# Patient Record
Sex: MALE | Race: WHITE | Employment: FULL TIME | URBAN - METROPOLITAN AREA
[De-identification: names, ages, dates, MRNs, and addresses within clinical notes are randomized per-mention and may not be internally consistent; named-entity substitution may affect disease eponyms.]

---

## 2018-08-06 ENCOUNTER — HOSPITAL ENCOUNTER (INPATIENT)
Age: 23
LOS: 4 days | Discharge: HOME HEALTH | DRG: 561 | End: 2018-08-10
Attending: PHYSICAL MEDICINE & REHABILITATION | Admitting: PHYSICAL MEDICINE & REHABILITATION
Payer: COMMERCIAL
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
## 2018-08-06 PROCEDURE — 1280000000 HC REHAB R&B

## 2018-08-06 PROCEDURE — 6370000000 HC RX 637 (ALT 250 FOR IP): Performed by: PHYSICAL MEDICINE & REHABILITATION

## 2018-08-06 PROCEDURE — 6360000002 HC RX W HCPCS: Performed by: PHYSICAL MEDICINE & REHABILITATION

## 2018-08-06 RX ORDER — OXYCODONE HYDROCHLORIDE 5 MG/1
10 TABLET ORAL EVERY 4 HOURS PRN
Status: DISCONTINUED | OUTPATIENT
Start: 2018-08-06 | End: 2018-08-07

## 2018-08-06 RX ORDER — OXYCODONE HYDROCHLORIDE 5 MG/1
5 TABLET ORAL EVERY 4 HOURS PRN
Status: DISCONTINUED | OUTPATIENT
Start: 2018-08-06 | End: 2018-08-07

## 2018-08-06 RX ORDER — DOCUSATE SODIUM 100 MG/1
100 CAPSULE, LIQUID FILLED ORAL 2 TIMES DAILY
Status: DISCONTINUED | OUTPATIENT
Start: 2018-08-06 | End: 2018-08-10 | Stop reason: HOSPADM

## 2018-08-06 RX ORDER — ACETAMINOPHEN 325 MG/1
650 TABLET ORAL EVERY 4 HOURS PRN
Status: DISCONTINUED | OUTPATIENT
Start: 2018-08-06 | End: 2018-08-07

## 2018-08-06 RX ORDER — LIDOCAINE 50 MG/G
2 PATCH TOPICAL DAILY
Status: DISCONTINUED | OUTPATIENT
Start: 2018-08-06 | End: 2018-08-10 | Stop reason: HOSPADM

## 2018-08-06 RX ORDER — TRAZODONE HYDROCHLORIDE 50 MG/1
50 TABLET ORAL NIGHTLY PRN
Status: DISCONTINUED | OUTPATIENT
Start: 2018-08-06 | End: 2018-08-10 | Stop reason: HOSPADM

## 2018-08-06 RX ORDER — ONDANSETRON 4 MG/1
8 TABLET, ORALLY DISINTEGRATING ORAL EVERY 8 HOURS PRN
Status: DISCONTINUED | OUTPATIENT
Start: 2018-08-06 | End: 2018-08-10 | Stop reason: HOSPADM

## 2018-08-06 RX ORDER — GABAPENTIN 300 MG/1
300 CAPSULE ORAL 3 TIMES DAILY
Status: DISCONTINUED | OUTPATIENT
Start: 2018-08-06 | End: 2018-08-10 | Stop reason: HOSPADM

## 2018-08-06 RX ADMIN — ENOXAPARIN SODIUM 50 MG: 60 INJECTION SUBCUTANEOUS at 23:39

## 2018-08-06 RX ADMIN — GABAPENTIN 300 MG: 300 CAPSULE ORAL at 20:41

## 2018-08-06 RX ADMIN — DOCUSATE SODIUM 100 MG: 100 CAPSULE, LIQUID FILLED ORAL at 20:41

## 2018-08-06 RX ADMIN — OXYCODONE HYDROCHLORIDE 10 MG: 5 TABLET ORAL at 20:41

## 2018-08-06 RX ADMIN — TRAZODONE HYDROCHLORIDE 50 MG: 50 TABLET ORAL at 22:34

## 2018-08-06 ASSESSMENT — PAIN DESCRIPTION - LOCATION: LOCATION: LEG;ARM;HIP

## 2018-08-06 ASSESSMENT — PAIN DESCRIPTION - ORIENTATION: ORIENTATION: RIGHT

## 2018-08-06 ASSESSMENT — PAIN SCALES - GENERAL
PAINLEVEL_OUTOF10: 3
PAINLEVEL_OUTOF10: 7
PAINLEVEL_OUTOF10: 5
PAINLEVEL_OUTOF10: 5

## 2018-08-06 ASSESSMENT — PAIN DESCRIPTION - DESCRIPTORS: DESCRIPTORS: CONSTANT;DISCOMFORT;SHARP

## 2018-08-06 ASSESSMENT — PAIN DESCRIPTION - ONSET: ONSET: ON-GOING

## 2018-08-06 ASSESSMENT — PAIN DESCRIPTION - FREQUENCY: FREQUENCY: CONTINUOUS
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
## 2018-08-06 NOTE — PROGRESS NOTES
4 Eyes Skin Assessment     The patient is being assess for   Admission    I agree that 2 RN's have performed a thorough Head to Toe Skin Assessment on the patient. ALL assessment sites listed below have been assessed. Areas assessed by both nurses:   [x]   Head, Face, and Ears   [x]   Shoulders, Back, and Chest, Abdomen  [x]   Arms, Elbows, and Hands   [x]   Coccyx, Sacrum, and Ischium  [x]   Legs, Feet, and Heels            **SHARE this note so that the co-signing nurse is able to place an eSignature**    Co-signer eSignature [employer]    Does the Patient have Skin Breakdown?   No          Thien Prevention initiated:  Yes   Wound Care Orders initiated:  Yes      [address]  nurse consulted for Pressure Injury (Stage 3,4, Unstageable, DTI, NWPT, Complex wounds)and New or Established Ostomies:  No      Primary Nurse eSignature: Electronically signed by Shruthi Parra RN on 8/6/18 at 4:38 PM

## 2018-08-07 LAB
A/G RATIO: 0.9 (ref 1.1–2.2)
ALBUMIN SERPL-MCNC: 3.5 G/DL (ref 3.4–5)
ALP BLD-CCNC: 142 U/L (ref 40–129)
ALT SERPL-CCNC: 34 U/L (ref 10–40)
ANION GAP SERPL CALCULATED.3IONS-SCNC: 13 MMOL/L (ref 3–16)
AST SERPL-CCNC: 22 U/L (ref 15–37)
BILIRUB SERPL-MCNC: 0.5 MG/DL (ref 0–1)
BUN BLDV-MCNC: 20 MG/DL (ref 7–20)
CALCIUM SERPL-MCNC: 9.6 MG/DL (ref 8.3–10.6)
CHLORIDE BLD-SCNC: 99 MMOL/L (ref 99–110)
CO2: 24 MMOL/L (ref 21–32)
CREAT SERPL-MCNC: <0.5 MG/DL (ref 0.9–1.3)
GFR AFRICAN AMERICAN: >60
GFR NON-AFRICAN AMERICAN: >60
GLOBULIN: 3.9 G/DL
GLUCOSE BLD-MCNC: 114 MG/DL (ref 70–99)
HCT VFR BLD CALC: 41.1 % (ref 40.5–52.5)
HEMOGLOBIN: 13.8 G/DL (ref 13.5–17.5)
MCH RBC QN AUTO: 30.6 PG (ref 26–34)
MCHC RBC AUTO-ENTMCNC: 33.5 G/DL (ref 31–36)
MCV RBC AUTO: 91.3 FL (ref 80–100)
PDW BLD-RTO: 12.8 % (ref 12.4–15.4)
PLATELET # BLD: 353 K/UL (ref 135–450)
PMV BLD AUTO: 7.3 FL (ref 5–10.5)
POTASSIUM REFLEX MAGNESIUM: 4.2 MMOL/L (ref 3.5–5.1)
RBC # BLD: 4.5 M/UL (ref 4.2–5.9)
SODIUM BLD-SCNC: 136 MMOL/L (ref 136–145)
TOTAL PROTEIN: 7.4 G/DL (ref 6.4–8.2)
WBC # BLD: 8 K/UL (ref 4–11)

## 2018-08-07 PROCEDURE — 97110 THERAPEUTIC EXERCISES: CPT

## 2018-08-07 PROCEDURE — 97535 SELF CARE MNGMENT TRAINING: CPT

## 2018-08-07 PROCEDURE — 97116 GAIT TRAINING THERAPY: CPT

## 2018-08-07 PROCEDURE — 97530 THERAPEUTIC ACTIVITIES: CPT

## 2018-08-07 PROCEDURE — 97167 OT EVAL HIGH COMPLEX 60 MIN: CPT

## 2018-08-07 PROCEDURE — 36415 COLL VENOUS BLD VENIPUNCTURE: CPT

## 2018-08-07 PROCEDURE — 6370000000 HC RX 637 (ALT 250 FOR IP): Performed by: PHYSICAL MEDICINE & REHABILITATION

## 2018-08-07 PROCEDURE — 6360000002 HC RX W HCPCS: Performed by: PHYSICAL MEDICINE & REHABILITATION

## 2018-08-07 PROCEDURE — 80053 COMPREHEN METABOLIC PANEL: CPT

## 2018-08-07 PROCEDURE — 97163 PT EVAL HIGH COMPLEX 45 MIN: CPT

## 2018-08-07 PROCEDURE — 1280000000 HC REHAB R&B

## 2018-08-07 PROCEDURE — 85027 COMPLETE CBC AUTOMATED: CPT

## 2018-08-07 RX ORDER — OXYCODONE HYDROCHLORIDE 15 MG/1
15 TABLET ORAL EVERY 4 HOURS PRN
Status: DISCONTINUED | OUTPATIENT
Start: 2018-08-07 | End: 2018-08-10 | Stop reason: HOSPADM

## 2018-08-07 RX ORDER — OXYCODONE HYDROCHLORIDE 5 MG/1
10 TABLET ORAL EVERY 4 HOURS PRN
Status: DISCONTINUED | OUTPATIENT
Start: 2018-08-07 | End: 2018-08-10 | Stop reason: HOSPADM

## 2018-08-07 RX ORDER — ACETAMINOPHEN 500 MG
1000 TABLET ORAL EVERY 8 HOURS PRN
Status: DISCONTINUED | OUTPATIENT
Start: 2018-08-07 | End: 2018-08-10 | Stop reason: HOSPADM

## 2018-08-07 RX ADMIN — OXYCODONE HYDROCHLORIDE 15 MG: 15 TABLET ORAL at 17:37

## 2018-08-07 RX ADMIN — ENOXAPARIN SODIUM 50 MG: 60 INJECTION SUBCUTANEOUS at 07:59

## 2018-08-07 RX ADMIN — OXYCODONE HYDROCHLORIDE 10 MG: 5 TABLET ORAL at 08:00

## 2018-08-07 RX ADMIN — OXYCODONE HYDROCHLORIDE 15 MG: 15 TABLET ORAL at 23:03

## 2018-08-07 RX ADMIN — DOCUSATE SODIUM 100 MG: 100 CAPSULE, LIQUID FILLED ORAL at 07:59

## 2018-08-07 RX ADMIN — OXYCODONE HYDROCHLORIDE 10 MG: 5 TABLET ORAL at 02:01

## 2018-08-07 RX ADMIN — GABAPENTIN 300 MG: 300 CAPSULE ORAL at 21:55

## 2018-08-07 RX ADMIN — ENOXAPARIN SODIUM 50 MG: 60 INJECTION SUBCUTANEOUS at 21:55

## 2018-08-07 RX ADMIN — OXYCODONE HYDROCHLORIDE 10 MG: 5 TABLET ORAL at 12:35

## 2018-08-07 RX ADMIN — GABAPENTIN 300 MG: 300 CAPSULE ORAL at 14:29

## 2018-08-07 RX ADMIN — DOCUSATE SODIUM 100 MG: 100 CAPSULE, LIQUID FILLED ORAL at 21:55

## 2018-08-07 RX ADMIN — GABAPENTIN 300 MG: 300 CAPSULE ORAL at 07:59

## 2018-08-07 ASSESSMENT — PAIN DESCRIPTION - ORIENTATION
ORIENTATION: LEFT;RIGHT
ORIENTATION: RIGHT;LEFT

## 2018-08-07 ASSESSMENT — PAIN SCALES - GENERAL
PAINLEVEL_OUTOF10: 0
PAINLEVEL_OUTOF10: 8
PAINLEVEL_OUTOF10: 8
PAINLEVEL_OUTOF10: 2
PAINLEVEL_OUTOF10: 3
PAINLEVEL_OUTOF10: 7
PAINLEVEL_OUTOF10: 5
PAINLEVEL_OUTOF10: 6
PAINLEVEL_OUTOF10: 7
PAINLEVEL_OUTOF10: 7
PAINLEVEL_OUTOF10: 8

## 2018-08-07 ASSESSMENT — PAIN DESCRIPTION - PAIN TYPE: TYPE: ACUTE PAIN

## 2018-08-07 ASSESSMENT — PAIN DESCRIPTION - LOCATION: LOCATION: LEG;ARM;HIP

## 2018-08-07 ASSESSMENT — PAIN DESCRIPTION - DESCRIPTORS: DESCRIPTORS: CONSTANT;DISCOMFORT;SHARP

## 2018-08-07 ASSESSMENT — PAIN DESCRIPTION - ONSET: ONSET: ON-GOING

## 2018-08-07 ASSESSMENT — PAIN DESCRIPTION - FREQUENCY: FREQUENCY: CONTINUOUS
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
## 2018-08-07 NOTE — PLAN OF CARE
Problem: Pain:  Goal: Control of acute pain  Control of acute pain   Outcome: Ongoing  Patient able to use pain rating scale 0/10 adequately without problems. Pain medications explained along with frequency and when to notify the nurse with  possible side effects. Verbalizes understanding. Call light within reach. Resting quietly in bed. ASk for appropriatley and delivered as needed.

## 2018-08-07 NOTE — PROGRESS NOTES
Walker  Shower - Transfer Type: To and From  Shower - Transfer To: Transfer tub bench  Shower - Technique: Ambulating  Shower Transfers: Minimal assistance  Shower Transfers Comments: with platform walker  ADL  Feeding: Modified independent  (use of LUE and modified technique)  Grooming: Modified independent  (seated in w/c at sink for brushing teeth, washing hand, and washing faces, and combing hair)  UE Bathing: Supervision (to bag RUE)  LE Bathing: Minimal assistance (to bathe L foot)  UE Dressing: Setup  LE Dressing: Maximum assistance (assist to pull underwear/pants over R hip, don L shoe/sock, and thread RLE into pants)  Toileting: Contact guard assistance  Tone RUE  RUE Tone: Normotonic  Tone LUE  LUE Tone: Normotonic  Coordination  Movements Are Fluid And Coordinated: Yes     Bed mobility  Sit to Supine: Stand by assistance  Transfers  Stand Step Transfers: Minimal assistance (with platform walker)  Stand Pivot Transfers: Minimal assistance (with platform walker)  Sit Pivot Transfers: Contact guard assistance  Sit to stand: Minimal assistance  Stand to sit: Minimal assistance  Vision - Basic Assessment  Prior Vision: No visual deficits  Visual History: No significant visual history  Patient Visual Report: No visual complaint reported.   Cognition  Overall Cognitive Status: WFL  Perception  Overall Perceptual Status: WFL     Sensation  Overall Sensation Status: WFL  Type of ROM/Therapeutic Exercise  Type of ROM/Therapeutic Exercise: Free weights;AROM  Comment: 3#, 6#  Exercises  Shoulder Flexion: LUE 6#, RUE AROM x20  Shoulder Extension: LUE 6#, RUE AROM x20  Horizontal ABduction: LUE 3#, RUE AROM x20  Horizontal ADduction: LUE 3#, RUE AROM x20  Elbow Flexion: LUE 6#, RUE AROM x20  Elbow Extension: LUE 6#, RUE AROM x20  Wrist Flexion: LUE 6#, x20  Wrist Extension: LUE 6#, x20  Finger Flexion: AROM RUE x20  Finger Extension: AROM RUE x20     LUE AROM (degrees)  LUE AROM : WFL  Left Hand AROM (degrees)  Left Hand AROM: WFL  RUE AROM (degrees)  RUE AROM : Exceptions  RUE General AROM: shoulder and elbow WFL, wrist immobilized in splint  Right Hand AROM (degrees)  Right Hand AROM: Exceptions  Right Hand General AROM: IPs WFL, MCPs immobilized in splint  LUE Strength  Gross LUE Strength: Exceptions to [facility]  LUE Strength Comment: grossly 4+/5  RUE Strength  Gross RUE Strength: Exceptions to [facility]  R Shoulder Flex: 4/5  R Shoulder Ext: 4/5  R Elbow Flex: 4/5  R Elbow Ext: 4/5  R Hand Grasp: NT  R Hand Release: NT     Hand Dominance  Hand Dominance: Right            Assessment   Performance deficits / Impairments: Decreased functional mobility ; Decreased safe awareness;Decreased balance;Decreased ADL status; Decreased endurance;Decreased strength;Decreased high-level IADLs  Assessment: Pt presents with the above deficits requiring skilled OT services to return to OF. Pt required min VCs for technique with platform walker and w/c. Pt with pain limiting ability to perform LB ADLs and required max A for LB dressing. Pt performed w/c mobility with good safety and transfers with min A/CGA. Prognosis: Good  Decision Making: High Complexity  Patient Education: role of OT, transfers training, ADL retraining, ther ex, safety  REQUIRES OT FOLLOW UP: Yes  Activity Tolerance  Activity Tolerance: Patient Tolerated treatment well;Patient limited by pain  Safety Devices  Safety Devices in place: Yes  Type of devices: Nurse notified;Gait belt;Call light within reach; Left in bed;Bed alarm in place         Plan   Plan  Times per week: 5/7 days  Plan weeks: 7 days  Current Treatment Recommendations: Strengthening, Endurance Training, Neuromuscular Re-education, Patient/Caregiver Education & Training, Self-Care / ADL, ROM, Balance Training, Functional Mobility Training, Safety Education & Training, Wheelchair Mobility Training, Equipment Evaluation, Education, & procurement    Goals  Short term goals  Time Frame for Short term goals: 3

## 2018-08-08 PROCEDURE — 6360000002 HC RX W HCPCS: Performed by: PHYSICAL MEDICINE & REHABILITATION

## 2018-08-08 PROCEDURE — 97110 THERAPEUTIC EXERCISES: CPT

## 2018-08-08 PROCEDURE — 6370000000 HC RX 637 (ALT 250 FOR IP): Performed by: PHYSICAL MEDICINE & REHABILITATION

## 2018-08-08 PROCEDURE — 97535 SELF CARE MNGMENT TRAINING: CPT

## 2018-08-08 PROCEDURE — 97530 THERAPEUTIC ACTIVITIES: CPT

## 2018-08-08 PROCEDURE — 97116 GAIT TRAINING THERAPY: CPT

## 2018-08-08 PROCEDURE — 1280000000 HC REHAB R&B

## 2018-08-08 RX ADMIN — ENOXAPARIN SODIUM 50 MG: 60 INJECTION SUBCUTANEOUS at 20:45

## 2018-08-08 RX ADMIN — DOCUSATE SODIUM 100 MG: 100 CAPSULE, LIQUID FILLED ORAL at 08:11

## 2018-08-08 RX ADMIN — ENOXAPARIN SODIUM 50 MG: 60 INJECTION SUBCUTANEOUS at 08:12

## 2018-08-08 RX ADMIN — GABAPENTIN 300 MG: 300 CAPSULE ORAL at 15:03

## 2018-08-08 RX ADMIN — DOCUSATE SODIUM 100 MG: 100 CAPSULE, LIQUID FILLED ORAL at 20:44

## 2018-08-08 RX ADMIN — OXYCODONE HYDROCHLORIDE 15 MG: 15 TABLET ORAL at 06:19

## 2018-08-08 RX ADMIN — ACETAMINOPHEN 1000 MG: 500 TABLET ORAL at 09:16

## 2018-08-08 RX ADMIN — OXYCODONE HYDROCHLORIDE 15 MG: 15 TABLET ORAL at 10:23

## 2018-08-08 RX ADMIN — OXYCODONE HYDROCHLORIDE 15 MG: 15 TABLET ORAL at 15:03

## 2018-08-08 RX ADMIN — GABAPENTIN 300 MG: 300 CAPSULE ORAL at 20:44

## 2018-08-08 RX ADMIN — GABAPENTIN 300 MG: 300 CAPSULE ORAL at 08:11

## 2018-08-08 RX ADMIN — OXYCODONE HYDROCHLORIDE 15 MG: 15 TABLET ORAL at 19:57

## 2018-08-08 ASSESSMENT — PAIN SCALES - GENERAL
PAINLEVEL_OUTOF10: 7
PAINLEVEL_OUTOF10: 8
PAINLEVEL_OUTOF10: 8
PAINLEVEL_OUTOF10: 0
PAINLEVEL_OUTOF10: 8
PAINLEVEL_OUTOF10: 8
PAINLEVEL_OUTOF10: 9
PAINLEVEL_OUTOF10: 7
PAINLEVEL_OUTOF10: 0

## 2018-08-08 ASSESSMENT — PAIN DESCRIPTION - DESCRIPTORS
DESCRIPTORS: ACHING;CONSTANT;DISCOMFORT;SHARP
DESCRIPTORS: SHOOTING;BURNING
DESCRIPTORS: ACHING;CONSTANT;DISCOMFORT;SHARP
DESCRIPTORS: ACHING
DESCRIPTORS: ACHING;CONSTANT;SHARP
DESCRIPTORS: ACHING;CONSTANT;DISCOMFORT;SHARP

## 2018-08-08 ASSESSMENT — PAIN DESCRIPTION - ONSET
ONSET: ON-GOING

## 2018-08-08 ASSESSMENT — PAIN DESCRIPTION - LOCATION
LOCATION: RIB CAGE;PELVIS
LOCATION: PELVIS;RIB CAGE
LOCATION: RIB CAGE;PELVIS
LOCATION: PELVIS;RIB CAGE

## 2018-08-08 ASSESSMENT — PAIN DESCRIPTION - PAIN TYPE
TYPE: ACUTE PAIN

## 2018-08-08 ASSESSMENT — PAIN DESCRIPTION - ORIENTATION
ORIENTATION: RIGHT;LEFT
ORIENTATION: LEFT;RIGHT
ORIENTATION: RIGHT;LEFT

## 2018-08-08 ASSESSMENT — PAIN DESCRIPTION - FREQUENCY
FREQUENCY: CONTINUOUS

## 2018-08-08 NOTE — PROGRESS NOTES
Physical Therapy  Facility/Department: [facility]  Daily Treatment Note  NAME: Meka Oliveira  : 1995  MRN: [id number]    Date of Service: 2018    Discharge Recommendations:  Patient would benefit from continued therapy after discharge, Home with assist PRN   PT Equipment Recommendations  Equipment Needed: Yes  Walker: Platform Right  Wheelchair: Standard  Other: recommend temporary ramp or family able to assist pt in /out  of home via [facility]    Patient Diagnosis(es): There were no encounter diagnoses. has a past medical history of Anxiety; Depression; Headache(784.0); and Substance abuse.   has a past surgical history that includes fracture surgery. Restrictions  Restrictions/Precautions  Restrictions/Precautions: Weight Bearing, General Precautions, Fall Risk  Required Braces or Orthoses?: No  Lower Extremity Weight Bearing Restrictions  Right Lower Extremity Weight Bearing: Non Weight Bearing  Upper Extremity Weight Bearing Restrictions  Right Upper Extremity Weight Bearing: Platform  Other:  elbow weight bearing to right upper limb; Position Activity Restriction  Other position/activity restrictions: Non weight bearing to right leg; elbow weight bearing to right upper limb; Notify physician for pulse less than 50 or greater than 120, respiratory rate less than 12 or greater than 25, systolic BP less than 90 or greater than 493, diastolic BP less than 50 or greater than 100. Subjective   General  Family / Caregiver Present: No  Subjective  Subjective: Pt rates pain pelvis as 8/10 and notes pelvic pain as well as rib pain with mobility.    Pain Screening  Patient Currently in Pain: Yes  Pain Assessment  Pain Assessment: 0-10  Pain Level: 8  Pain Location: Pelvis;Rib cage  Pain Orientation: Right;Left  Pain Intervention(s): Medication (see eMar);Repositioned  Vital Signs  Patient Currently in Pain: Yes       Orientation  Orientation  Overall Orientation Status: Within Normal Limits  Objective   Bed x15 BLE   Heelslides: heel/slides/SKTC in range that doesn't increase pain x10 BLE   Gluteal Sets: x15 BLE   Hip Abduction: Hip IR/ER in supine in ROM without  increased pain, hip abduction supine in ROM without increased pain. 15 reps BLE   Knee Short Arc Quad: x30 BLE   Ankle Pumps: x30 RLE  Comments: Pt educated in above exercises,  UE/LE  WB precautions, use of abdominals for pelvic stability with LE movement in bed with pt demo indep in hep  with pt given written hep. Pt seen for split session:  Reviewed hep for LE, given written hep with pt demo indep in written hep of supine LE therex. transfer Bed>WC sit pivot transfer CGA with indep  set up   WC propulsion 100 feet x3 indep   Sit<>stand  with platform walker and cues for technique 4x from WC<>chair min asssit with facilitated anterior weight shift but no lifting . Gait with platform walker: cues to lean forward, bend left hip and knee for counter balance and not to hold Left foot out in front when walking but behind with knee bend NWB RLE and WB on right elbow only . CGA 10 feet x4   Exercises: Pt on return demo indep with written hep. Quad Sets: x15 BLE   Heelslides: heel/slides/SKTC in range that doesn't increase pain x10 BLE   Gluteal Sets: x15 BLE   Hip Abduction: Hip IR/ER in supine in ROM without  increased pain, hip abduction supine in ROM without increased pain. 15 reps BLE   Knee Short Arc Quad: x30 BLE   Ankle Pumps: x30 RLE                 Assessment   Assessment: Pt seen for split session today with education in WB precautions, LE therex hep. gait train and transfer train with platform walker and bed mobility train with abdominal bracing and use of pillows between LE. Pt progressed to indep with bed mobility and CGA to mod assist with transfers as well as 10 feet gait with  platform walker. Pelvic pain with mobility limits indep with mobility at present. PT requesting his family come in for training this week prior to DC .

## 2018-08-08 NOTE — PLAN OF CARE
Problem: Pain:  Goal: Control of acute pain  Control of acute pain   Outcome: Ongoing  Pt has been having constant acute pain in chest/rib cage and pelvis. Pt receives oxycodone 15mg every 4 hours for pain rated between 4-10. Pt also received Tylenol 650mg this morning between doses of the oxycodone. Pt has ordered 2 lidocaine patches that he prefers on the right side of his rib cage. Educated on nonpharm remedies to decrease pain, such as heat packs. Will continue to monitor pt pain level.
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
## 2018-08-09 LAB
ANION GAP SERPL CALCULATED.3IONS-SCNC: 17 MMOL/L (ref 3–16)
BASOPHILS ABSOLUTE: 0.1 K/UL (ref 0–0.2)
BASOPHILS RELATIVE PERCENT: 1.1 %
BUN BLDV-MCNC: 20 MG/DL (ref 7–20)
CALCIUM SERPL-MCNC: 9.7 MG/DL (ref 8.3–10.6)
CHLORIDE BLD-SCNC: 100 MMOL/L (ref 99–110)
CO2: 21 MMOL/L (ref 21–32)
CREAT SERPL-MCNC: 0.7 MG/DL (ref 0.9–1.3)
EOSINOPHILS ABSOLUTE: 0.2 K/UL (ref 0–0.6)
EOSINOPHILS RELATIVE PERCENT: 1.9 %
GFR AFRICAN AMERICAN: >60
GFR NON-AFRICAN AMERICAN: >60
GLUCOSE BLD-MCNC: 106 MG/DL (ref 70–99)
HCT VFR BLD CALC: 42 % (ref 40.5–52.5)
HEMOGLOBIN: 14.4 G/DL (ref 13.5–17.5)
LYMPHOCYTES ABSOLUTE: 2.4 K/UL (ref 1–5.1)
LYMPHOCYTES RELATIVE PERCENT: 28.7 %
MCH RBC QN AUTO: 30.6 PG (ref 26–34)
MCHC RBC AUTO-ENTMCNC: 34.2 G/DL (ref 31–36)
MCV RBC AUTO: 89.5 FL (ref 80–100)
MONOCYTES ABSOLUTE: 0.9 K/UL (ref 0–1.3)
MONOCYTES RELATIVE PERCENT: 10.3 %
NEUTROPHILS ABSOLUTE: 4.9 K/UL (ref 1.7–7.7)
NEUTROPHILS RELATIVE PERCENT: 58 %
PDW BLD-RTO: 12.8 % (ref 12.4–15.4)
PLATELET # BLD: 459 K/UL (ref 135–450)
PMV BLD AUTO: 7.4 FL (ref 5–10.5)
POTASSIUM REFLEX MAGNESIUM: 4.5 MMOL/L (ref 3.5–5.1)
RBC # BLD: 4.69 M/UL (ref 4.2–5.9)
SODIUM BLD-SCNC: 138 MMOL/L (ref 136–145)
WBC # BLD: 8.5 K/UL (ref 4–11)

## 2018-08-09 PROCEDURE — 97116 GAIT TRAINING THERAPY: CPT

## 2018-08-09 PROCEDURE — 97110 THERAPEUTIC EXERCISES: CPT

## 2018-08-09 PROCEDURE — 80048 BASIC METABOLIC PNL TOTAL CA: CPT

## 2018-08-09 PROCEDURE — 36415 COLL VENOUS BLD VENIPUNCTURE: CPT

## 2018-08-09 PROCEDURE — 97530 THERAPEUTIC ACTIVITIES: CPT

## 2018-08-09 PROCEDURE — 97535 SELF CARE MNGMENT TRAINING: CPT

## 2018-08-09 PROCEDURE — 1280000000 HC REHAB R&B

## 2018-08-09 PROCEDURE — 6370000000 HC RX 637 (ALT 250 FOR IP): Performed by: PHYSICAL MEDICINE & REHABILITATION

## 2018-08-09 PROCEDURE — 85025 COMPLETE CBC W/AUTO DIFF WBC: CPT

## 2018-08-09 RX ORDER — OXYCODONE HYDROCHLORIDE 5 MG/1
10-15 TABLET ORAL EVERY 4 HOURS PRN
Qty: 50 TABLET | Refills: 0 | Status: SHIPPED | OUTPATIENT
Start: 2018-08-09 | End: 2018-08-16

## 2018-08-09 RX ORDER — ACETAMINOPHEN 500 MG
1000 TABLET ORAL EVERY 8 HOURS PRN
Qty: 120 TABLET | Refills: 0 | Status: SHIPPED | OUTPATIENT
Start: 2018-08-09 | End: 2021-08-20

## 2018-08-09 RX ORDER — PSEUDOEPHEDRINE HCL 30 MG
100 TABLET ORAL 2 TIMES DAILY PRN
Qty: 60 CAPSULE | Refills: 0 | COMMUNITY
Start: 2018-08-09 | End: 2021-08-20

## 2018-08-09 RX ORDER — GABAPENTIN 300 MG/1
300 CAPSULE ORAL 3 TIMES DAILY
Qty: 90 CAPSULE | Refills: 0 | Status: ON HOLD | OUTPATIENT
Start: 2018-08-09 | End: 2021-08-22 | Stop reason: HOSPADM

## 2018-08-09 RX ADMIN — DOCUSATE SODIUM 100 MG: 100 CAPSULE, LIQUID FILLED ORAL at 09:14

## 2018-08-09 RX ADMIN — OXYCODONE HYDROCHLORIDE 15 MG: 15 TABLET ORAL at 22:46

## 2018-08-09 RX ADMIN — GABAPENTIN 300 MG: 300 CAPSULE ORAL at 21:17

## 2018-08-09 RX ADMIN — OXYCODONE HYDROCHLORIDE 10 MG: 5 TABLET ORAL at 10:34

## 2018-08-09 RX ADMIN — OXYCODONE HYDROCHLORIDE 15 MG: 15 TABLET ORAL at 06:10

## 2018-08-09 RX ADMIN — OXYCODONE HYDROCHLORIDE 15 MG: 15 TABLET ORAL at 14:37

## 2018-08-09 RX ADMIN — GABAPENTIN 300 MG: 300 CAPSULE ORAL at 09:14

## 2018-08-09 RX ADMIN — DOCUSATE SODIUM 100 MG: 100 CAPSULE, LIQUID FILLED ORAL at 21:17

## 2018-08-09 RX ADMIN — GABAPENTIN 300 MG: 300 CAPSULE ORAL at 13:40

## 2018-08-09 RX ADMIN — OXYCODONE HYDROCHLORIDE 15 MG: 15 TABLET ORAL at 18:39

## 2018-08-09 ASSESSMENT — PAIN SCALES - GENERAL
PAINLEVEL_OUTOF10: 8
PAINLEVEL_OUTOF10: 8
PAINLEVEL_OUTOF10: 0
PAINLEVEL_OUTOF10: 8
PAINLEVEL_OUTOF10: 6
PAINLEVEL_OUTOF10: 0
PAINLEVEL_OUTOF10: 7
PAINLEVEL_OUTOF10: 0
PAINLEVEL_OUTOF10: 0
PAINLEVEL_OUTOF10: 9
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
## 2018-08-09 NOTE — DISCHARGE SUMMARY
ADL  Feeding: Independent  Grooming: Modified independent  (brushing teeth at sink)  UE Bathing: Independent  LE Bathing: Modified independent  (while seated)  UE Dressing: Setup  LE Dressing: Minimal assistance (for L sock and shoe)  Toileting: Contact guard assistance (for standing balance during clothing management)    Bed mobility  Rolling to Left: Independent  Rolling to Right: Independent  Supine to Sit: Independent  Sit to Supine: Independent  Scooting: Independent    Functional Transfers: Toilet Transfers  Toilet - Technique: Stand step  Equipment Used: Standard toilet  Toilet Transfer: Supervision    Tub Transfers  Tub - Transfer From: Wheelchair  Tub - Transfer Type: To and From  Tub - Transfer To: Transfer tub bench  Tub - Technique: Sit pivot  Tub Transfers: Supervision    Shower Transfers  Shower - Transfer From: Mammie Deems - Transfer Type: To and From  Shower - Transfer To:  Transfer tub bench  Shower - Technique: Ambulating  Shower Transfers: Contact Guard  Shower Transfers Comments: with platform walker, pt able to perform with supervision for sit pivot if needed           Functional Mobility  Functional - Mobility Device: Platform walker  Activity: To/from bathroom  Assist Level: Stand by assistance                           Perception  Overall Perceptual Status: WFL         UE Function:  Hand Dominance  Hand Dominance: Right             Admitting OT FIM scores  Eatin - Feeds self with adaptive equipment/dentures and/or feeds self with modified diet and/or performs own tube feeding  Groomin - Independent with all tasks using assistive device  Bathin - Able to bathe all 10 areas with device  Dressing-Upper: 5 - Requires setup/supervision/cues and/or requires assist with presthesis/brace only  Dressing-Lower: 2 - Requires assist with 4-5 parts of dressing  Toiletin - Requires steadying assistance only  Toilet Transfer: 4 - Requires steadying assistance only < 25% assist  Tub

## 2018-08-09 NOTE — PROGRESS NOTES
Delivered walker to [facility] hospital room. Arranged for wheelchair to be delivered to home. Patient will purchase their own TTB.   Thanks for the referral.  Wiliam Hernandez  8/9/2018

## 2018-08-09 NOTE — PROGRESS NOTES
goals met DC PT. Goals  Short term goals  Time Frame for Short term goals: 8/9/2018  Short term goal 1: Pt demo  indep in bed mobility sup<>sit, scooting. GOAL MET 8/8/2018 Pt demo  indep in bed mobility sup<>sit, scooting. Short term goal 2: Patient demo indep in LE supine and seated therex NWB RLE and no AROm right foot ankle. GOAL MET 8/8/2018 Patient given written hep with Patient demo indep in LE supine and seated therex NWB RLE and no AROm right foot ankle. Short term goal 3: Patient demo indep in [address] mobility carpet , level left/right turns and managing brakes and LE on/off foot pedals 150' feet (household distances) . GOAL MET 8/9/2018 Pt demo 300 feet indep WC propulsion with LUE/LLE (10 feet of carpet and 40 feet outdoors on concrete) carpet , level left/right turns and managing brakes and LE on/off foot pedals. Long term goals  Time Frame for Long term goals : 8/11/2018  Long term goal 1: Pt demo  modified indep in gait with platform walker NWB RLE and WB on elbow only RLE for distances WC to bathroom 10 feet or greater. GOAL MET 8/9/2018 Pt demo  modified indep in gait with platform walker NWB RLE and WB on elbow only RLE for distances WC to bathroom 10 feet  on level indoor/outdoor  and carpet. Long term goal 2: Pt demo  indep transfers bed<>WC and sit<>Stand with appropriate AD NWB RLE and WB on right elbow only. GOAL MET 8/9/2018 Patient demo  indep transfers bed<>WC sit pivot transfer with swing back arm rest  and sit<>Stand  with gait bed<>WC with right platform walker  AD NWB RLE and WB on right elbow only. Long term goal 3: Pt's father demo ability to indep assist pt up and down curb step with WC to enter and egress home. GOAL MET 8/9/2018 Pt's father demo ability to indep assist pt up and down curb step with WC to enter and egress home as well as demo ability to safely assist pt from [address] to car with use of platform walker. Patient Goals   Patient goals :  \"Be able to walk
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
## 2018-08-10 VITALS
WEIGHT: 301.37 LBS | BODY MASS INDEX: 40.82 KG/M2 | TEMPERATURE: 98 F | SYSTOLIC BLOOD PRESSURE: 144 MMHG | RESPIRATION RATE: 18 BRPM | HEART RATE: 86 BPM | DIASTOLIC BLOOD PRESSURE: 89 MMHG | OXYGEN SATURATION: 96 % | HEIGHT: 72 IN

## 2018-08-10 PROCEDURE — 6370000000 HC RX 637 (ALT 250 FOR IP): Performed by: PHYSICAL MEDICINE & REHABILITATION

## 2018-08-10 RX ADMIN — OXYCODONE HYDROCHLORIDE 15 MG: 15 TABLET ORAL at 07:47

## 2018-08-10 RX ADMIN — GABAPENTIN 300 MG: 300 CAPSULE ORAL at 07:48

## 2018-08-10 ASSESSMENT — PAIN DESCRIPTION - ORIENTATION: ORIENTATION: RIGHT

## 2018-08-10 ASSESSMENT — PAIN DESCRIPTION - LOCATION: LOCATION: LEG

## 2018-08-10 ASSESSMENT — PAIN SCALES - GENERAL
PAINLEVEL_OUTOF10: 8
PAINLEVEL_OUTOF10: 8

## 2018-08-10 NOTE — DISCHARGE SUMMARY
minutes with supervision for standing balance and good coordination and problem solving. Pt performed w/c mobility for collection of cones around kitchen area with pt opening cabinets and appliances. Pt with mod I for mobility and good safety awareness. Third Session: Pt completed supine<>sit with independence and sit pivot transfers bed<>w/c with supervision. Pt completed ther ex with good strength and moderate pain increase in R ribs. Pt performed all exercises with use of handout and no VCs.     Speech therapy:  Comprehension: 6 - Complex ideas 90% or device (hearing aid/glasses)  Expression: 6 - Device used to express complex ideas/needs  Social Interaction: 6 - Patient requires medication for mood and/or effect  Problem Solvin - Patient solves simple/routine tasks 25%-49%  Memory: 6 - Patient requires device to recall (e.g. memory book)      Significant Diagnostics:   Lab Results   Component Value Date    CREATININE 0.7 (L) 2018    BUN 20 2018     2018    K 4.5 2018     2018    CO2 21 2018       Lab Results   Component Value Date    WBC 8.5 2018    HGB 14.4 2018    HCT 42.0 2018    MCV 89.5 2018     (H) 2018       Disposition:  Outpatient Ortho procedure and then home with parents  Services: Home care RN (hold off of PT/OT until WB restrictions lifted)  DME: maual wheelchair, platform rolling walker, tub transfer bench    Discharge Condition: Stable    Follow-up:  See after visit summary from hospitalization    Discharge Medications:     Medication List      START taking these medications    acetaminophen 500 MG tablet  Commonly known as:  TYLENOL  Take 2 tablets by mouth every 8 hours as needed for Pain     docusate 100 MG Caps  Commonly known as:  COLACE, DULCOLAX  Take 100 mg by mouth 2 times daily as needed for Constipation     gabapentin 300 MG capsule  Commonly known as:  NEURONTIN  Take 1 capsule by mouth 3 times daily for 30 days. Jamel Davalos oxyCODONE 5 MG immediate release tablet  Commonly known as:  ROXICODONE  Take 2-3 tablets by mouth every 4 hours as needed for Pain for up to 7 days. .           Where to Get Your Medications      These medications were sent to [facility] [address] [facility] [address], [place] [address]    Phone:  [phone]   · acetaminophen 500 MG tablet  · gabapentin 300 MG capsule     You can get these medications from any pharmacy    Bring a paper prescription for each of these medications  · oxyCODONE 5 MG immediate release tablet  You don't need a prescription for these medications  · docusate 100 MG Caps         I spent over 35 minutes on this discharge encounter between counseling, coordination of care, and medication reconciliation.      Leon Lopez MD

## 2018-08-10 NOTE — CARE COORDINATION
ANA LUISA met with the patient. He stated to save money, his family is going to transport him to the surgery. ALBINO Landers stated the patient needs to be at the [facility] by 9:40am. Address: [address] [place] [address]. This was given to the patient. Addendum at 2:45pm: ANA LUISA was notified by Lucía Cortes that the patient needs a Tub Transfer Bench. Dunia Malave with [facility] aware.      Chloe Montgomery MSW, LSW
CASE MANAGEMENT DISCHARGE SUMMARY      Discharge to: Home with [facility] for nursing. Therapy to follow once Ortho says it's okay. New Durable Medical Equipment ordered/agency: Wheelchair and cushion and Right Platform Attachment Rolling walker through [employer]. Juancarlos Carrillo delivered the rolling walker to his room and the wheelchair will be delivered to his house. Patient is purchasing a Tub Transfer Bench on his own (see Alan Cartagena liaison's note on 8-9-18)    Transportation: Patient's dad is picking him up and taking him to surgery. Notified: Patient    Facility/Agency: [facility] faxed [facility] at [phone]. She confirmed it was received.    RN: Temple Gottron Helmer MSW, LSW
Received notification that patient will be having surgery Friday morning and will be discharged from our ARU prior to that. He is aware the DME will be delivered to his house from [place]. He may need transportation to surgery. Will follow up tomorrow.      Gisselle Garnett MSW, LSW
SW asked Dr. Hannah Benavides to order DME and document why the patient needs them. Rajinder Hoff with [facility] is aware patient will likely be ready for discharge this weekend per therapy and these items are needed by then.      Bel Julian MSW, LSW
community: He stated that his family, ina, parents and brother are a support. Discharge plan: He plans to return home with support from family    Summary: Patient is a 20year old male presenting to the ARU. He maintained appropriate eye contact and conversation throughout the initial assessment. He stated that his father should be the main contact. He was educated on team conferences and family training. He denied any questions or concerns. Electronic continuity of care form is on the chart. Case Management (CM) will continue to follow for recommendations from the treatment team. Please notify Case Management if needs or concerns arise.      Jackie Dos Santos MSW, LSW

## 2018-09-19 ENCOUNTER — TELEPHONE (OUTPATIENT)
Dept: INTERNAL MEDICINE CLINIC | Age: 23
End: 2018-09-19

## 2018-09-19 NOTE — TELEPHONE ENCOUNTER
Was given plan of care papers with note to call to try to set up office visit for face-to-face with patient to do paperwork. Dr. Letha Councilman also asked if ortho can sign. Mssg asked for pt to call back to set up appt or talk about options.

## 2021-08-20 ENCOUNTER — HOSPITAL ENCOUNTER (OUTPATIENT)
Age: 26
Setting detail: OBSERVATION
Discharge: HOME | End: 2021-08-22
Attending: EMERGENCY MEDICINE | Admitting: INTERNAL MEDICINE
Payer: MEDICAID

## 2021-08-20 ENCOUNTER — APPOINTMENT (OUTPATIENT)
Dept: CT IMAGING | Age: 26
End: 2021-08-20
Payer: MEDICAID

## 2021-08-20 ENCOUNTER — APPOINTMENT (OUTPATIENT)
Dept: GENERAL RADIOLOGY | Age: 26
End: 2021-08-20
Payer: MEDICAID

## 2021-08-20 DIAGNOSIS — R07.9 CHEST PAIN, UNSPECIFIED TYPE: Primary | ICD-10-CM

## 2021-08-20 DIAGNOSIS — I16.0 HYPERTENSIVE URGENCY: ICD-10-CM

## 2021-08-20 LAB
A/G RATIO: 1.2 (ref 1.1–2.2)
ALBUMIN SERPL-MCNC: 4.4 G/DL (ref 3.4–5)
ALP BLD-CCNC: 167 U/L (ref 40–129)
ALT SERPL-CCNC: 46 U/L (ref 10–40)
AMPHETAMINE SCREEN, URINE: ABNORMAL
ANION GAP SERPL CALCULATED.3IONS-SCNC: 15 MMOL/L (ref 3–16)
AST SERPL-CCNC: 43 U/L (ref 15–37)
BARBITURATE SCREEN URINE: ABNORMAL
BASOPHILS ABSOLUTE: 0.1 K/UL (ref 0–0.2)
BASOPHILS RELATIVE PERCENT: 0.8 %
BENZODIAZEPINE SCREEN, URINE: ABNORMAL
BILIRUB SERPL-MCNC: 0.5 MG/DL (ref 0–1)
BILIRUBIN URINE: NEGATIVE
BLOOD, URINE: NEGATIVE
BUN BLDV-MCNC: 16 MG/DL (ref 7–20)
CALCIUM SERPL-MCNC: 9.6 MG/DL (ref 8.3–10.6)
CANNABINOID SCREEN URINE: POSITIVE
CHLORIDE BLD-SCNC: 103 MMOL/L (ref 99–110)
CLARITY: CLEAR
CO2: 20 MMOL/L (ref 21–32)
COCAINE METABOLITE SCREEN URINE: ABNORMAL
COLOR: YELLOW
CREAT SERPL-MCNC: 0.8 MG/DL (ref 0.9–1.3)
EKG ATRIAL RATE: 77 BPM
EKG DIAGNOSIS: NORMAL
EKG P AXIS: 30 DEGREES
EKG P-R INTERVAL: 134 MS
EKG Q-T INTERVAL: 388 MS
EKG QRS DURATION: 90 MS
EKG QTC CALCULATION (BAZETT): 439 MS
EKG R AXIS: 45 DEGREES
EKG T AXIS: 6 DEGREES
EKG VENTRICULAR RATE: 77 BPM
EOSINOPHILS ABSOLUTE: 0 K/UL (ref 0–0.6)
EOSINOPHILS RELATIVE PERCENT: 0.6 %
GFR AFRICAN AMERICAN: >60
GFR NON-AFRICAN AMERICAN: >60
GLOBULIN: 3.8 G/DL
GLUCOSE BLD-MCNC: 110 MG/DL (ref 70–99)
GLUCOSE URINE: NEGATIVE MG/DL
HCT VFR BLD CALC: 44 % (ref 40.5–52.5)
HEMOGLOBIN: 15.2 G/DL (ref 13.5–17.5)
KETONES, URINE: NEGATIVE MG/DL
LEUKOCYTE ESTERASE, URINE: NEGATIVE
LIPASE: 23 U/L (ref 13–60)
LYMPHOCYTES ABSOLUTE: 1.6 K/UL (ref 1–5.1)
LYMPHOCYTES RELATIVE PERCENT: 20.4 %
Lab: ABNORMAL
MCH RBC QN AUTO: 32.4 PG (ref 26–34)
MCHC RBC AUTO-ENTMCNC: 34.6 G/DL (ref 31–36)
MCV RBC AUTO: 93.8 FL (ref 80–100)
METHADONE SCREEN, URINE: ABNORMAL
MICROSCOPIC EXAMINATION: NORMAL
MONOCYTES ABSOLUTE: 0.6 K/UL (ref 0–1.3)
MONOCYTES RELATIVE PERCENT: 7.2 %
NEUTROPHILS ABSOLUTE: 5.7 K/UL (ref 1.7–7.7)
NEUTROPHILS RELATIVE PERCENT: 71 %
NITRITE, URINE: NEGATIVE
OPIATE SCREEN URINE: ABNORMAL
OXYCODONE URINE: ABNORMAL
PDW BLD-RTO: 13.1 % (ref 12.4–15.4)
PH UA: 5.5 (ref 5–8)
PH UA: 6
PHENCYCLIDINE SCREEN URINE: ABNORMAL
PLATELET # BLD: 246 K/UL (ref 135–450)
PMV BLD AUTO: 8.4 FL (ref 5–10.5)
POTASSIUM REFLEX MAGNESIUM: 4.3 MMOL/L (ref 3.5–5.1)
PROPOXYPHENE SCREEN: ABNORMAL
PROTEIN UA: NEGATIVE MG/DL
RBC # BLD: 4.69 M/UL (ref 4.2–5.9)
SODIUM BLD-SCNC: 138 MMOL/L (ref 136–145)
SPECIFIC GRAVITY UA: >=1.03 (ref 1–1.03)
TOTAL PROTEIN: 8.2 G/DL (ref 6.4–8.2)
TROPONIN: <0.01 NG/ML
URINE REFLEX TO CULTURE: NORMAL
URINE TYPE: NORMAL
UROBILINOGEN, URINE: 0.2 E.U./DL
WBC # BLD: 8.1 K/UL (ref 4–11)

## 2021-08-20 PROCEDURE — 96376 TX/PRO/DX INJ SAME DRUG ADON: CPT

## 2021-08-20 PROCEDURE — 93005 ELECTROCARDIOGRAM TRACING: CPT | Performed by: PHYSICIAN ASSISTANT

## 2021-08-20 PROCEDURE — 83690 ASSAY OF LIPASE: CPT

## 2021-08-20 PROCEDURE — 2500000003 HC RX 250 WO HCPCS: Performed by: PHYSICIAN ASSISTANT

## 2021-08-20 PROCEDURE — 80053 COMPREHEN METABOLIC PANEL: CPT

## 2021-08-20 PROCEDURE — 93010 ELECTROCARDIOGRAM REPORT: CPT | Performed by: INTERNAL MEDICINE

## 2021-08-20 PROCEDURE — G0378 HOSPITAL OBSERVATION PER HR: HCPCS

## 2021-08-20 PROCEDURE — 81003 URINALYSIS AUTO W/O SCOPE: CPT

## 2021-08-20 PROCEDURE — 6360000004 HC RX CONTRAST MEDICATION: Performed by: PHYSICIAN ASSISTANT

## 2021-08-20 PROCEDURE — 71046 X-RAY EXAM CHEST 2 VIEWS: CPT

## 2021-08-20 PROCEDURE — 36415 COLL VENOUS BLD VENIPUNCTURE: CPT

## 2021-08-20 PROCEDURE — 2500000003 HC RX 250 WO HCPCS: Performed by: INTERNAL MEDICINE

## 2021-08-20 PROCEDURE — 96374 THER/PROPH/DIAG INJ IV PUSH: CPT

## 2021-08-20 PROCEDURE — 80307 DRUG TEST PRSMV CHEM ANLYZR: CPT

## 2021-08-20 PROCEDURE — 99284 EMERGENCY DEPT VISIT MOD MDM: CPT

## 2021-08-20 PROCEDURE — 74177 CT ABD & PELVIS W/CONTRAST: CPT

## 2021-08-20 PROCEDURE — 2580000003 HC RX 258: Performed by: PHYSICIAN ASSISTANT

## 2021-08-20 PROCEDURE — 2580000003 HC RX 258: Performed by: INTERNAL MEDICINE

## 2021-08-20 PROCEDURE — 6370000000 HC RX 637 (ALT 250 FOR IP): Performed by: INTERNAL MEDICINE

## 2021-08-20 PROCEDURE — 6370000000 HC RX 637 (ALT 250 FOR IP): Performed by: PHYSICIAN ASSISTANT

## 2021-08-20 PROCEDURE — 84484 ASSAY OF TROPONIN QUANT: CPT

## 2021-08-20 PROCEDURE — 85025 COMPLETE CBC W/AUTO DIFF WBC: CPT

## 2021-08-20 RX ORDER — ACETAMINOPHEN 650 MG/1
650 SUPPOSITORY RECTAL EVERY 6 HOURS PRN
Status: DISCONTINUED | OUTPATIENT
Start: 2021-08-20 | End: 2021-08-22 | Stop reason: HOSPADM

## 2021-08-20 RX ORDER — SODIUM CHLORIDE 0.9 % (FLUSH) 0.9 %
5-40 SYRINGE (ML) INJECTION EVERY 12 HOURS SCHEDULED
Status: DISCONTINUED | OUTPATIENT
Start: 2021-08-20 | End: 2021-08-22 | Stop reason: HOSPADM

## 2021-08-20 RX ORDER — ONDANSETRON 4 MG/1
4 TABLET, ORALLY DISINTEGRATING ORAL EVERY 8 HOURS PRN
Status: DISCONTINUED | OUTPATIENT
Start: 2021-08-20 | End: 2021-08-22 | Stop reason: HOSPADM

## 2021-08-20 RX ORDER — LABETALOL HYDROCHLORIDE 5 MG/ML
10 INJECTION, SOLUTION INTRAVENOUS EVERY 6 HOURS PRN
Status: DISCONTINUED | OUTPATIENT
Start: 2021-08-20 | End: 2021-08-22 | Stop reason: HOSPADM

## 2021-08-20 RX ORDER — ONDANSETRON 2 MG/ML
4 INJECTION INTRAMUSCULAR; INTRAVENOUS EVERY 6 HOURS PRN
Status: DISCONTINUED | OUTPATIENT
Start: 2021-08-20 | End: 2021-08-22 | Stop reason: HOSPADM

## 2021-08-20 RX ORDER — ACETAMINOPHEN 325 MG/1
650 TABLET ORAL EVERY 6 HOURS PRN
Status: DISCONTINUED | OUTPATIENT
Start: 2021-08-20 | End: 2021-08-22 | Stop reason: HOSPADM

## 2021-08-20 RX ORDER — LABETALOL HYDROCHLORIDE 5 MG/ML
20 INJECTION, SOLUTION INTRAVENOUS ONCE
Status: COMPLETED | OUTPATIENT
Start: 2021-08-20 | End: 2021-08-20

## 2021-08-20 RX ORDER — 0.9 % SODIUM CHLORIDE 0.9 %
1000 INTRAVENOUS SOLUTION INTRAVENOUS ONCE
Status: COMPLETED | OUTPATIENT
Start: 2021-08-20 | End: 2021-08-20

## 2021-08-20 RX ORDER — LISINOPRIL 10 MG/1
10 TABLET ORAL DAILY
Status: DISCONTINUED | OUTPATIENT
Start: 2021-08-20 | End: 2021-08-21

## 2021-08-20 RX ORDER — SODIUM CHLORIDE 0.9 % (FLUSH) 0.9 %
5-40 SYRINGE (ML) INJECTION PRN
Status: DISCONTINUED | OUTPATIENT
Start: 2021-08-20 | End: 2021-08-22 | Stop reason: HOSPADM

## 2021-08-20 RX ORDER — SODIUM CHLORIDE 9 MG/ML
25 INJECTION, SOLUTION INTRAVENOUS PRN
Status: DISCONTINUED | OUTPATIENT
Start: 2021-08-20 | End: 2021-08-22 | Stop reason: HOSPADM

## 2021-08-20 RX ORDER — ASPIRIN 81 MG/1
324 TABLET, CHEWABLE ORAL ONCE
Status: COMPLETED | OUTPATIENT
Start: 2021-08-20 | End: 2021-08-20

## 2021-08-20 RX ADMIN — Medication 10 ML: at 21:15

## 2021-08-20 RX ADMIN — LISINOPRIL 10 MG: 10 TABLET ORAL at 21:15

## 2021-08-20 RX ADMIN — ASPIRIN 324 MG: 81 TABLET, CHEWABLE ORAL at 12:25

## 2021-08-20 RX ADMIN — SODIUM CHLORIDE 1000 ML: 9 INJECTION, SOLUTION INTRAVENOUS at 12:27

## 2021-08-20 RX ADMIN — LABETALOL HYDROCHLORIDE 10 MG: 5 INJECTION INTRAVENOUS at 23:25

## 2021-08-20 RX ADMIN — LABETALOL HYDROCHLORIDE 20 MG: 5 INJECTION INTRAVENOUS at 14:16

## 2021-08-20 RX ADMIN — IOPAMIDOL 75 ML: 755 INJECTION, SOLUTION INTRAVENOUS at 13:04

## 2021-08-20 ASSESSMENT — PAIN DESCRIPTION - LOCATION
LOCATION: CHEST
LOCATION: ABDOMEN
LOCATION: CHEST

## 2021-08-20 ASSESSMENT — ENCOUNTER SYMPTOMS
EYE PAIN: 0
SHORTNESS OF BREATH: 1
ABDOMINAL PAIN: 1
COUGH: 0
VOMITING: 0
BACK PAIN: 0
DIARRHEA: 0
NAUSEA: 1

## 2021-08-20 ASSESSMENT — PAIN DESCRIPTION - DESCRIPTORS
DESCRIPTORS: PRESSURE
DESCRIPTORS: CONSTANT

## 2021-08-20 ASSESSMENT — PAIN SCALES - GENERAL
PAINLEVEL_OUTOF10: 0
PAINLEVEL_OUTOF10: 6
PAINLEVEL_OUTOF10: 3

## 2021-08-20 ASSESSMENT — PAIN DESCRIPTION - ONSET: ONSET: ON-GOING

## 2021-08-20 ASSESSMENT — PAIN DESCRIPTION - PAIN TYPE: TYPE: ACUTE PAIN

## 2021-08-20 ASSESSMENT — HEART SCORE: ECG: 0

## 2021-08-20 ASSESSMENT — PAIN DESCRIPTION - FREQUENCY: FREQUENCY: CONTINUOUS

## 2021-08-20 ASSESSMENT — PAIN DESCRIPTION - ORIENTATION
ORIENTATION: RIGHT;UPPER
ORIENTATION: MID

## 2021-08-20 ASSESSMENT — PAIN DESCRIPTION - PROGRESSION: CLINICAL_PROGRESSION: NOT CHANGED

## 2021-08-20 NOTE — ED NOTES
Pt state that he was at work today and had a pain in his stomach and in his chest. Pt state that his pain has gotten better . Pt is awake alert orient x4. Pt ruddy any nausea and vomiting. Pt does not have a cardiac hx. Pt lungs CTA. Pt walk with a steady gait. Pt state that he has no hx of hypertension however he also state he can't remember the last time he saw a pcp d/t what he thought was lack of insurance.  Pt perrl 3/3     Rahat Felix RN  08/20/21 821 Shannon Hunter RN  08/20/21 821 Shannon Hunter RN  08/20/21 1431

## 2021-08-20 NOTE — ED PROVIDER NOTES
Emergency Department Provider Note     Location: [address] [place] [address]  ED  8/20/2021    I independently performed a history and physical on [facility]. All diagnostic, treatment, and disposition decisions were made by myself in conjunction with the mid-level provider. Briefly, this is a 27 y.o. male here for right upper quadrant abdominal pain    ED Triage Vitals   BP Temp Temp Source Pulse Resp SpO2 Height Weight   08/20/21 1104 08/20/21 1100 08/20/21 1100 08/20/21 1104 08/20/21 1100 08/20/21 1104 08/20/21 1100 08/20/21 1100   (!) 213/129 98.5 °F (36.9 °C) Oral 90 20 98 % 5' 11\" (1.803 m) 285 lb (129.3 kg)        Patient resting comfortably in no acute distress. Heart is regular rate and rhythm. Lungs clear to auscultation bilaterally. Abdomen is soft, nondistended, and nontender. No peripheral edema noted. Patient seen and examined. EKG  The Ekg interpreted by me in the absence of a cardiologist shows. normal sinus rhythm with a rate of 77  Axis is   Normal  QTc is  45  Intervals and Durations are unremarkable. No specific ST-T wave changes appreciated. No evidence of acute ischemia. No significant change from prior EKG dated November 25, 2013 had possible left atrial enlargement nonspecific T wave abnormality      CT ABDOMEN PELVIS W IV CONTRAST Additional Contrast? None    Result Date: 8/20/2021  EXAMINATION: CT OF THE ABDOMEN AND PELVIS WITH CONTRAST 8/20/2021 12:53 pm TECHNIQUE: CT of the abdomen and pelvis was performed with the administration of intravenous contrast. Multiplanar reformatted images are provided for review. Dose modulation, iterative reconstruction, and/or weight based adjustment of the mA/kV was utilized to reduce the radiation dose to as low as reasonably achievable. COMPARISON: None.  HISTORY: ORDERING SYSTEM PROVIDED HISTORY: RUQ pain that radiates up towards sternum, onset this morning TECHNOLOGIST PROVIDED HISTORY: Reason for exam:->RUQ pain that radiates up towards sternum, onset this morning Additional Contrast?->None Decision Support Exception - unselect if not a suspected or confirmed emergency medical condition->Emergency Medical Condition (MA) Reason for Exam: RUQ pain since this am-constipation Acuity: Acute Type of Exam: Initial Relevant Medical/Surgical History: no surg FINDINGS: Lower Chest: No basilar airspace disease. Organs: Liver is fatty. No intrahepatic ductal dilatation. Gallbladder is within normal limits. Splenule adjacent to the spleen. Stomach is grossly unremarkable. No pancreatic ductal dilatation or stranding. Adrenal glands are within normal limits. No hydronephrosis or perinephric stranding. Excretion is seen from bilateral kidneys. GI/Bowel: Loops of small and large bowel are nondilated. Appendix is without adjacent inflammatory stranding. Cecum is within the right upper quadrant, which may be developmental or reflect ligamentous laxity. Pelvis: Pelvic phleboliths. Bladder is grossly unremarkable. No inguinal adenopathy. Peritoneum/Retroperitoneum: No free air. Bones/Soft Tissues: Mild age-indeterminate anterior wedging of T10 through T12. Degenerative change of the spine. Cecum resides within the right upper quadrant which may be developmental or reflect ligamentous laxity. No evidence of appendicitis.          Results for orders placed or performed during the hospital encounter of 08/20/21   CBC Auto Differential   Result Value Ref Range    WBC 8.1 4.0 - 11.0 K/uL    RBC 4.69 4.20 - 5.90 M/uL    Hemoglobin 15.2 13.5 - 17.5 g/dL    Hematocrit 44.0 40.5 - 52.5 %    MCV 93.8 80.0 - 100.0 fL    MCH 32.4 26.0 - 34.0 pg    MCHC 34.6 31.0 - 36.0 g/dL    RDW 13.1 12.4 - 15.4 %    Platelets 326 083 - 594 K/uL    MPV 8.4 5.0 - 10.5 fL    Neutrophils % 71.0 %    Lymphocytes % 20.4 %    Monocytes % 7.2 %    Eosinophils % 0.6 %    Basophils % 0.8 %    Neutrophils Absolute 5.7 1.7 - 7.7 K/uL    Lymphocytes Absolute 1.6 1.0 - 5.1 K/uL Monocytes Absolute 0.6 0.0 - 1.3 K/uL    Eosinophils Absolute 0.0 0.0 - 0.6 K/uL    Basophils Absolute 0.1 0.0 - 0.2 K/uL   Comprehensive Metabolic Panel w/ Reflex to MG   Result Value Ref Range    Sodium 138 136 - 145 mmol/L    Potassium reflex Magnesium 4.3 3.5 - 5.1 mmol/L    Chloride 103 99 - 110 mmol/L    CO2 20 (L) 21 - 32 mmol/L    Anion Gap 15 3 - 16    Glucose 110 (H) 70 - 99 mg/dL    BUN 16 7 - 20 mg/dL    CREATININE 0.8 (L) 0.9 - 1.3 mg/dL    GFR Non-African American >60 >60    GFR African American >60 >60    Calcium 9.6 8.3 - 10.6 mg/dL    Total Protein 8.2 6.4 - 8.2 g/dL    Albumin 4.4 3.4 - 5.0 g/dL    Albumin/Globulin Ratio 1.2 1.1 - 2.2    Total Bilirubin 0.5 0.0 - 1.0 mg/dL    Alkaline Phosphatase 167 (H) 40 - 129 U/L    ALT 46 (H) 10 - 40 U/L    AST 43 (H) 15 - 37 U/L    Globulin 3.8 g/dL   Lipase   Result Value Ref Range    Lipase 23.0 13.0 - 60.0 U/L   Troponin   Result Value Ref Range    Troponin <0.01 <0.01 ng/mL   Urinalysis Reflex to Culture    Specimen: Urine, clean catch   Result Value Ref Range    Color, UA Yellow Straw/Yellow    Clarity, UA Clear Clear    Glucose, Ur Negative Negative mg/dL    Bilirubin Urine Negative Negative    Ketones, Urine Negative Negative mg/dL    Specific Gravity, UA >=1.030 1.005 - 1.030    Blood, Urine Negative Negative    pH, UA 5.5 5.0 - 8.0    Protein, UA Negative Negative mg/dL    Urobilinogen, Urine 0.2 <2.0 E.U./dL    Nitrite, Urine Negative Negative    Leukocyte Esterase, Urine Negative Negative    Microscopic Examination Not Indicated     Urine Type NotGiven     Urine Reflex to Culture Not Indicated    Urine Drug Screen   Result Value Ref Range    Amphetamine Screen, Urine Neg Negative <1000ng/mL    Barbiturate Screen, Ur Neg Negative <200 ng/mL    Benzodiazepine Screen, Urine Neg Negative <200 ng/mL    Cannabinoid Scrn, Ur POSITIVE (A) Negative <50 ng/mL    Cocaine Metabolite Screen, Urine Neg Negative <300 ng/mL    Opiate Scrn, Ur Neg Negative <300 ng/mL    PCP Screen, Urine Neg Negative <25 ng/mL    Methadone Screen, Urine Neg Negative <300 ng/mL    Propoxyphene Scrn, Ur Neg Negative <300 ng/mL    Oxycodone Urine Neg Negative <100 ng/ml    pH, UA 6.0     Drug Screen Comment: see below    EKG 12 Lead   Result Value Ref Range    Ventricular Rate 77 BPM    Atrial Rate 77 BPM    P-R Interval 134 ms    QRS Duration 90 ms    Q-T Interval 388 ms    QTc Calculation (Bazett) 439 ms    P Axis 30 degrees    R Axis 45 degrees    T Axis 6 degrees    Diagnosis       Normal sinus rhythmNormal ECGWhen compared with ECG of 25-NOV-2013 08:25,No significant change was foundConfirmed by Viktoria Laguna ([id number]) on 8/20/2021 12:18:19 PM       For further details of [facility] emergency department encounter, please see CARLYLE Doty's documentation. 1. Chest pain, unspecified type    2. Hypertensive urgency        This chart was generated in part by using Dragon Dictation system and may contain errors related to that system including errors in grammar, punctuation, and spelling, as well as words and phrases that may be inappropriate. If there are any questions or concerns please feel free to contact the dictating provider for clarification.          Keily Kevin MD  08/22/21 2278

## 2021-08-20 NOTE — ED PROVIDER NOTES
use, other drug use, history of PE, DVT, unilateral leg pain or swelling. Patient states he was told he has high blood pressure by a PCP several years ago but did not start medications. Nursing Notes were all reviewed and agreed with or any disagreements were addressed in the HPI. REVIEW OF SYSTEMS    (2-9 systems for level 4, 10 or more for level 5)     Review of Systems   Constitutional: Positive for diaphoresis. Negative for chills, fatigue and fever. Eyes: Negative for pain. Respiratory: Positive for shortness of breath. Negative for cough. Cardiovascular: Positive for chest pain. Gastrointestinal: Positive for abdominal pain and nausea. Negative for diarrhea and vomiting. Genitourinary: Negative for dysuria. Musculoskeletal: Negative for back pain, neck pain and neck stiffness. Skin: Negative for rash. Neurological: Negative for dizziness and headaches. Psychiatric/Behavioral: Negative for confusion. Positives and Pertinent negatives as per HPI. Except as noted above in the ROS, all other systems were reviewed and negative. PAST MEDICAL HISTORY     Past Medical History:   Diagnosis Date    Anxiety     Depression     Headache(784.0)     Substance abuse ([facility] [address]          SURGICAL HISTORY     Past Surgical History:   Procedure Laterality Date    FRACTURE SURGERY           CURRENTMEDICATIONS       Previous Medications    GABAPENTIN (NEURONTIN) 300 MG CAPSULE    Take 1 capsule by mouth 3 times daily for 30 days. .         ALLERGIES     Buspar [buspirone], No known allergies, and Paroxetine    FAMILYHISTORY       Family History   Problem Relation Age of Onset    Other Mother         amyloidosis    High Blood Pressure Father           SOCIAL HISTORY       Social History     Tobacco Use    Smoking status: Never Smoker    Smokeless tobacco: Former User     Types: Chew   Substance Use Topics    Alcohol use:  Yes     Alcohol/week: 0.0 standard drinks     Comment: one day a week  Drug use: Yes     Types: Marijuana     Comment: 1-2 x a month       SCREENINGS      Heart Score for chest pain patients  History: Highly Suspicious  ECG: Normal  Patient Age: < 45 years  *Risk factors for Atherosclerotic disease: Positive family History, Hypertension, Obesity  Risk Factors: > 3 Risk factors or history of atherosclerotic disease*  Troponin: < 1X normal limit  Heart Score Total: 4      PHYSICAL EXAM    (up to 7 for level 4, 8 or more for level 5)     ED Triage Vitals   BP Temp Temp Source Pulse Resp SpO2 Height Weight   08/20/21 1104 08/20/21 1100 08/20/21 1100 08/20/21 1104 08/20/21 1100 08/20/21 1104 08/20/21 1100 08/20/21 1100   (!) 213/129 98.5 °F (36.9 °C) Oral 90 20 98 % 5' 11\" (1.803 m) 285 lb (129.3 kg)       Physical Exam  Vitals and nursing note reviewed. Constitutional:       General: He is not in acute distress. Appearance: He is well-developed. He is obese. He is not toxic-appearing or diaphoretic. HENT:      Head: Normocephalic and atraumatic. Eyes:      General:         Right eye: No discharge. Left eye: No discharge. Cardiovascular:      Rate and Rhythm: Normal rate and regular rhythm. Pulmonary:      Effort: Pulmonary effort is normal. No respiratory distress. Breath sounds: No stridor. Chest:      Chest wall: No tenderness. Abdominal:      General: Abdomen is flat. Palpations: Abdomen is soft. Tenderness: There is no abdominal tenderness. Musculoskeletal:         General: Normal range of motion. Cervical back: Normal range of motion and neck supple. Right lower leg: No edema. Left lower leg: No edema. Skin:     General: Skin is warm and dry. Coloration: Skin is not pale. Neurological:      Mental Status: He is alert and oriented to person, place, and time. Comments: No gross facial drooping. Moves all 4 extremities spontaneously.    Psychiatric:         Behavior: Behavior normal.         DIAGNOSTIC RESULTS LABS:    Labs Reviewed   COMPREHENSIVE METABOLIC PANEL W/ REFLEX TO MG FOR LOW K - Abnormal; Notable for the following components:       Result Value    CO2 20 (*)     Glucose 110 (*)     CREATININE 0.8 (*)     Alkaline Phosphatase 167 (*)     ALT 46 (*)     AST 43 (*)     All other components within normal limits    Narrative:     Performed at:  [place] [address] [address]   Phone [phone]   [address] - Abnormal; Notable for the following components:    Cannabinoid Scrn, Ur POSITIVE (*)     All other components within normal limits    Narrative:     Performed at:  [place] [address] [address]   Phone [phone]   CBC WITH AUTO DIFFERENTIAL    Narrative:     Performed at:  [place] [address] [address]   Phone [phone]   LIPASE    Narrative:     Performed at:  [place] [address] [address]   Phone [phone]   TROPONIN    Narrative:     Performed at:  [place] [address] [address]   Phone [phone]   URINE RT REFLEX TO CULTURE    Narrative:     Performed at:  [place] [address] [address]   Phone [phone]       When ordered only abnormal lab results are displayed. All other labs were within normal range or not returned as of this dictation. EKG: When ordered, EKG's are interpreted by the Emergency Department Physician in the absence of a cardiologist.  Please see their note for interpretation of EKG.     RADIOLOGY:   Non-plain film images such as CT, Ultrasound and MRI are read by the radiologist. Plain radiographic images are visualized and preliminarily interpreted by the ED Provider with the below findings:        Interpretation per the Radiologist below, if available at the time of this note:    CT ABDOMEN PELVIS W IV CONTRAST Additional Contrast? None   Final Result   Cecum resides within the right upper quadrant which may be developmental or   reflect ligamentous laxity. No evidence of appendicitis. XR CHEST (2 VW)    (Results Pending)     No results found. PROCEDURES   Unless otherwise noted below, none     Procedures    CRITICAL CARE TIME   Because of high probability of sudden clinical deterioration of the patient's condition or  further deterioration, critical care time included my full attention to the patient's condition, including chart data review, documentation, medication ordering, reviewing the patient's old records, reevaluation patient's cardiac, pulmonary and neurological status. Reassessment of vital signs. Consultations with ED attending and admitting physician. Ordering, interpreting reviewing diagnostic testing. Therefore, my critical care time was 45 minutes of direct attention to the patient's condition did not include time spent on procedures.     CONSULTS:  IP CONSULT TO HOSPITALIST      EMERGENCY DEPARTMENT COURSE and DIFFERENTIAL DIAGNOSIS/MDM:   Vitals:    Vitals:    08/20/21 1343 08/20/21 1345 08/20/21 1415 08/20/21 1419   BP: (!) 181/127 (!) 181/127 (!) 186/118 (!) 168/100   Pulse: 85 82 74 81   Resp: 15 26 15 11   Temp: 98.5 °F (36.9 °C) 98.5 °F (36.9 °C) 98.5 °F (36.9 °C) 98.5 °F (36.9 °C)   TempSrc:       SpO2: 100% 99% 99% 98%   Weight:       Height:           Patient was given the following medications:  Medications   aspirin chewable tablet 324 mg (324 mg Oral Given 8/20/21 1225)   0.9 % sodium chloride bolus (0 mLs Intravenous Stopped 8/20/21 1336)   iopamidol (ISOVUE-370) 76 % injection 75 mL (75 mLs Intravenous Given 8/20/21 1304)   labetalol (NORMODYNE;TRANDATE) injection 20 mg (20 mg Intravenous Given 8/20/21 1416)         Patient seen and examined today for exertional chest pain while unloading boxes,a/w nausea, diaphoresis, SOB. See HPI for patient presentation. Patient is hemodynamically stable, in no acute distress, nontoxic, afebrile, and without tachycardia, tachypnea, and hypoxia. He arrived with /129. Physical exam as above. PMH obesity BMI 39, self reported untreated HTN. Given ASA. Patient is PERC negative, and is low risk for pulmonary embolism: age is <50, HR <100, O2 Sat on RA >95%, no prior history of venous thromboembolism, no trauma or surgery within the past 4 weeks, no hemoptysis, no exogenous estrogen use, and no unilateral leg swelling. Additionally, the patient is not tachycardic, tachypneic or hypoxemic. Elevated Heart score of 4 given HPI, BMI, HTN, +FMH. work up with trop, labs, CT abd negative for acute findings. most recent /127. Discussed work up and plan with patient. Pain continues to improve. Answered all questions. Agrees to stay for admission for ACS/hypertensive emergency rule out. Ordered IV labetalol. Patient also seen and evaluated by my attending Dr. Lawrence Parrish    Will admit to hospitalist for hypertensive urgency, chest pain    /100 at reevaluation after Labetalol.       Results for orders placed or performed during the hospital encounter of 08/20/21   CBC Auto Differential   Result Value Ref Range    WBC 8.1 4.0 - 11.0 K/uL    RBC 4.69 4.20 - 5.90 M/uL    Hemoglobin 15.2 13.5 - 17.5 g/dL    Hematocrit 44.0 40.5 - 52.5 %    MCV 93.8 80.0 - 100.0 fL    MCH 32.4 26.0 - 34.0 pg    MCHC 34.6 31.0 - 36.0 g/dL    RDW 13.1 12.4 - 15.4 %    Platelets 005 753 - 790 K/uL    MPV 8.4 5.0 - 10.5 fL    Neutrophils % 71.0 %    Lymphocytes % 20.4 %    Monocytes % 7.2 %    Eosinophils % 0.6 %    Basophils % 0.8 %    Neutrophils Absolute 5.7 1.7 - 7.7 K/uL    Lymphocytes Absolute 1.6 1.0 - 5.1 K/uL    Monocytes Absolute 0.6 0.0 - 1.3 K/uL    Eosinophils Absolute 0.0 0.0 - 0.6 K/uL    Basophils Absolute 0.1 0.0 - 0.2 K/uL   Comprehensive Metabolic Panel w/ Reflex to MG Result Value Ref Range    Sodium 138 136 - 145 mmol/L    Potassium reflex Magnesium 4.3 3.5 - 5.1 mmol/L    Chloride 103 99 - 110 mmol/L    CO2 20 (L) 21 - 32 mmol/L    Anion Gap 15 3 - 16    Glucose 110 (H) 70 - 99 mg/dL    BUN 16 7 - 20 mg/dL    CREATININE 0.8 (L) 0.9 - 1.3 mg/dL    GFR Non-African American >60 >60    GFR African American >60 >60    Calcium 9.6 8.3 - 10.6 mg/dL    Total Protein 8.2 6.4 - 8.2 g/dL    Albumin 4.4 3.4 - 5.0 g/dL    Albumin/Globulin Ratio 1.2 1.1 - 2.2    Total Bilirubin 0.5 0.0 - 1.0 mg/dL    Alkaline Phosphatase 167 (H) 40 - 129 U/L    ALT 46 (H) 10 - 40 U/L    AST 43 (H) 15 - 37 U/L    Globulin 3.8 g/dL   Lipase   Result Value Ref Range    Lipase 23.0 13.0 - 60.0 U/L   Troponin   Result Value Ref Range    Troponin <0.01 <0.01 ng/mL   Urinalysis Reflex to Culture    Specimen: Urine, clean catch   Result Value Ref Range    Color, UA Yellow Straw/Yellow    Clarity, UA Clear Clear    Glucose, Ur Negative Negative mg/dL    Bilirubin Urine Negative Negative    Ketones, Urine Negative Negative mg/dL    Specific Gravity, UA >=1.030 1.005 - 1.030    Blood, Urine Negative Negative    pH, UA 5.5 5.0 - 8.0    Protein, UA Negative Negative mg/dL    Urobilinogen, Urine 0.2 <2.0 E.U./dL    Nitrite, Urine Negative Negative    Leukocyte Esterase, Urine Negative Negative    Microscopic Examination Not Indicated     Urine Type NotGiven     Urine Reflex to Culture Not Indicated    Urine Drug Screen   Result Value Ref Range    Amphetamine Screen, Urine Neg Negative <1000ng/mL    Barbiturate Screen, Ur Neg Negative <200 ng/mL    Benzodiazepine Screen, Urine Neg Negative <200 ng/mL    Cannabinoid Scrn, Ur POSITIVE (A) Negative <50 ng/mL    Cocaine Metabolite Screen, Urine Neg Negative <300 ng/mL    Opiate Scrn, Ur Neg Negative <300 ng/mL    PCP Screen, Urine Neg Negative <25 ng/mL    Methadone Screen, Urine Neg Negative <300 ng/mL    Propoxyphene Scrn, Ur Neg Negative <300 ng/mL    Oxycodone Urine Neg Negative <100 ng/ml    pH, UA 6.0     Drug Screen Comment: see below    EKG 12 Lead   Result Value Ref Range    Ventricular Rate 77 BPM    Atrial Rate 77 BPM    P-R Interval 134 ms    QRS Duration 90 ms    Q-T Interval 388 ms    QTc Calculation (Bazett) 439 ms    P Axis 30 degrees    R Axis 45 degrees    T Axis 6 degrees    Diagnosis       Normal sinus rhythmNormal ECGWhen compared with ECG of 25-NOV-2013 08:25,No significant change was foundConfirmed by Prosper Hoang ([id number]) on 8/20/2021 12:18:19 PM       I consulted with Dr. Jacob Lau. We thoroughly discussed the history, physical exam, laboratory and imaging studies, as well as, emergency department course. Based upon that discussion, we've decided to admit [facility] for further observation and evaluation of Jonelle Walters's chest pain. As I have deemed necessary from their history, physical, and studies, I have considered and evaluated [facility] for the following diagnoses:  ACUTE CORONARY SYNDROME, PERICARDIAL TAMPONADE, PNEUMOTHORAX, PULMONARY EMBOLISM, and THORACIC DISSECTION. FINAL IMPRESSION      1. Chest pain, unspecified type    2. Hypertensive urgency          DISPOSITION/PLAN   DISPOSITION Decision To Admit 08/20/2021 01:57:29 PM      PATIENT REFERRED TO:  No follow-up provider specified.     DISCHARGE MEDICATIONS:  New Prescriptions    No medications on file       DISCONTINUED MEDICATIONS:  Discontinued Medications    ACETAMINOPHEN (TYLENOL) 500 MG TABLET    Take 2 tablets by mouth every 8 hours as needed for Pain    DOCUSATE SODIUM (COLACE, DULCOLAX) 100 MG CAPS    Take 100 mg by mouth 2 times daily as needed for Constipation              (Please note that portions of this note were completed with a voice recognition program.  Efforts were made to edit the dictations but occasionally words are mis-transcribed.)    CARLYLE Ruiz (electronically signed)            CARLYLE Ruiz  08/20/21 [id number]

## 2021-08-20 NOTE — H&P
Blood Pressure Father        REVIEW OF SYSTEMS COMPLETED:   Pertinent positives as noted in the HPI. All other systems reviewed and negative. PHYSICAL EXAM PERFORMED:    BP (!) 171/116   Pulse 78   Temp 97.7 °F (36.5 °C) (Oral)   Resp 16   Ht 5' 11\" (1.803 m)   Wt 285 lb (129.3 kg)   SpO2 100%   BMI 39.75 kg/m²     General appearance:  No apparent distress, appears stated age and cooperative. HEENT:  Normal cephalic, atraumatic without obvious deformity. Pupils equal, round, and reactive to light. Extra ocular muscles intact. Conjunctivae/corneas clear. Neck: Supple, with full range of motion. No jugular venous distention. Trachea midline. Respiratory:  Normal respiratory effort. Clear to auscultation, bilaterally without Rales/Wheezes/Rhonchi. Cardiovascular:  Regular rate and rhythm with normal S1/S2 without murmurs, rubs or gallops. Abdomen: Soft, non-tender, non-distended with normal bowel sounds. Musculoskeletal:  No clubbing, cyanosis or edema bilaterally. Full range of motion without deformity. Skin: Skin color, texture, turgor normal.  No rashes or lesions. Neurologic:  Neurovascularly intact without any focal sensory/motor deficits. Cranial nerves: II-XII intact, grossly non-focal.  Psychiatric:  Alert and oriented, thought content appropriate, normal insight  Capillary Refill: Brisk,3 seconds, normal  Peripheral Pulses: +2 palpable, equal bilaterally       Labs:     Recent Labs     08/20/21  1239   WBC 8.1   HGB 15.2   HCT 44.0        Recent Labs     08/20/21  1239      K 4.3      CO2 20*   BUN 16   CREATININE 0.8*   CALCIUM 9.6     Recent Labs     08/20/21  1239   AST 43*   ALT 46*   BILITOT 0.5   ALKPHOS 167*     No results for input(s): INR in the last 72 hours.   Recent Labs     08/20/21  1239   TROPONINI <0.01       Urinalysis:      Lab Results   Component Value Date    NITRU Negative 08/20/2021    BLOODU Negative 08/20/2021    SPECGRAV >=1.030 08/20/2021 GLUCOSEU Negative 08/20/2021       Radiology:     CXR: I have reviewed the CXR with the following interpretation: no acute disease  EKG:  I have reviewed the EKG with the following interpretation: NSR    XR CHEST (2 VW)   Final Result   No acute cardiopulmonary process. CT ABDOMEN PELVIS W IV CONTRAST Additional Contrast? None   Final Result   Cecum resides within the right upper quadrant which may be developmental or   reflect ligamentous laxity. No evidence of appendicitis. ASSESSMENT:    Active Hospital Problems    Diagnosis Date Noted    Chest pain [R07.9] 08/20/2021         PLAN:  Chest pain  - low concern for ACS  - likely related to poorly controlled HTN and anxiety  - EKG, trop negative  - no need for further cardiac testing at this time    Hypertensive urgency  - improved with IV labetalol  - started lisinopril. Further titration as needed  - will need outpatient follow up with PCP    Elevated LFTs  - likely due to HTN  - If not improving, will order further work up    Obesity  With Body mass index is 48.90 kg/m². Complicating assessment and treatment. Placing patient at risk for multiple co-morbidities as well as early death and contributing to the patient's presentation. Counseled on weight loss. DVT Prophylaxis: lovenox  Diet: ADULT DIET; Regular  Code Status: Full Code    PT/OT Eval Status: not ordered    Dispo - likely home tomorrow       Gagandeep Jeffries MD    Thank you No primary care provider on file. for the opportunity to be involved in this patient's care. If you have any questions or concerns please feel free to contact me at [phone].

## 2021-08-21 LAB
ANION GAP SERPL CALCULATED.3IONS-SCNC: 12 MMOL/L (ref 3–16)
BUN BLDV-MCNC: 13 MG/DL (ref 7–20)
CALCIUM SERPL-MCNC: 9.1 MG/DL (ref 8.3–10.6)
CHLORIDE BLD-SCNC: 106 MMOL/L (ref 99–110)
CO2: 22 MMOL/L (ref 21–32)
CREAT SERPL-MCNC: 0.7 MG/DL (ref 0.9–1.3)
GFR AFRICAN AMERICAN: >60
GFR NON-AFRICAN AMERICAN: >60
GLUCOSE BLD-MCNC: 106 MG/DL (ref 70–99)
HCT VFR BLD CALC: 42.8 % (ref 40.5–52.5)
HEMOGLOBIN: 15 G/DL (ref 13.5–17.5)
MCH RBC QN AUTO: 33.1 PG (ref 26–34)
MCHC RBC AUTO-ENTMCNC: 35 G/DL (ref 31–36)
MCV RBC AUTO: 94.7 FL (ref 80–100)
PDW BLD-RTO: 13.1 % (ref 12.4–15.4)
PLATELET # BLD: 228 K/UL (ref 135–450)
PMV BLD AUTO: 8.3 FL (ref 5–10.5)
POTASSIUM REFLEX MAGNESIUM: 5 MMOL/L (ref 3.5–5.1)
RBC # BLD: 4.52 M/UL (ref 4.2–5.9)
SODIUM BLD-SCNC: 140 MMOL/L (ref 136–145)
WBC # BLD: 8 K/UL (ref 4–11)

## 2021-08-21 PROCEDURE — 85027 COMPLETE CBC AUTOMATED: CPT

## 2021-08-21 PROCEDURE — 6360000002 HC RX W HCPCS: Performed by: NURSE PRACTITIONER

## 2021-08-21 PROCEDURE — 6370000000 HC RX 637 (ALT 250 FOR IP): Performed by: INTERNAL MEDICINE

## 2021-08-21 PROCEDURE — G0378 HOSPITAL OBSERVATION PER HR: HCPCS

## 2021-08-21 PROCEDURE — 93005 ELECTROCARDIOGRAM TRACING: CPT | Performed by: INTERNAL MEDICINE

## 2021-08-21 PROCEDURE — 80048 BASIC METABOLIC PNL TOTAL CA: CPT

## 2021-08-21 PROCEDURE — 96376 TX/PRO/DX INJ SAME DRUG ADON: CPT

## 2021-08-21 PROCEDURE — 2500000003 HC RX 250 WO HCPCS: Performed by: INTERNAL MEDICINE

## 2021-08-21 PROCEDURE — 96375 TX/PRO/DX INJ NEW DRUG ADDON: CPT

## 2021-08-21 PROCEDURE — 36415 COLL VENOUS BLD VENIPUNCTURE: CPT

## 2021-08-21 PROCEDURE — 96372 THER/PROPH/DIAG INJ SC/IM: CPT

## 2021-08-21 PROCEDURE — 6360000002 HC RX W HCPCS: Performed by: INTERNAL MEDICINE

## 2021-08-21 PROCEDURE — 2580000003 HC RX 258: Performed by: INTERNAL MEDICINE

## 2021-08-21 RX ORDER — HYDRALAZINE HYDROCHLORIDE 20 MG/ML
10 INJECTION INTRAMUSCULAR; INTRAVENOUS ONCE
Status: COMPLETED | OUTPATIENT
Start: 2021-08-21 | End: 2021-08-21

## 2021-08-21 RX ORDER — NICOTINE 21 MG/24HR
1 PATCH, TRANSDERMAL 24 HOURS TRANSDERMAL DAILY
Status: DISCONTINUED | OUTPATIENT
Start: 2021-08-21 | End: 2021-08-22 | Stop reason: HOSPADM

## 2021-08-21 RX ORDER — AMLODIPINE BESYLATE 5 MG/1
10 TABLET ORAL DAILY
Status: DISCONTINUED | OUTPATIENT
Start: 2021-08-22 | End: 2021-08-22 | Stop reason: HOSPADM

## 2021-08-21 RX ORDER — AMLODIPINE BESYLATE 5 MG/1
5 TABLET ORAL DAILY
Status: DISCONTINUED | OUTPATIENT
Start: 2021-08-21 | End: 2021-08-21

## 2021-08-21 RX ORDER — LISINOPRIL 20 MG/1
20 TABLET ORAL DAILY
Status: DISCONTINUED | OUTPATIENT
Start: 2021-08-21 | End: 2021-08-22 | Stop reason: HOSPADM

## 2021-08-21 RX ADMIN — Medication 10 ML: at 09:12

## 2021-08-21 RX ADMIN — LISINOPRIL 20 MG: 20 TABLET ORAL at 09:12

## 2021-08-21 RX ADMIN — AMLODIPINE BESYLATE 5 MG: 5 TABLET ORAL at 09:12

## 2021-08-21 RX ADMIN — ENOXAPARIN SODIUM 40 MG: 40 INJECTION SUBCUTANEOUS at 09:11

## 2021-08-21 RX ADMIN — LABETALOL HYDROCHLORIDE 10 MG: 5 INJECTION INTRAVENOUS at 23:06

## 2021-08-21 RX ADMIN — HYDRALAZINE HYDROCHLORIDE 10 MG: 20 INJECTION INTRAMUSCULAR; INTRAVENOUS at 04:56

## 2021-08-21 ASSESSMENT — PAIN SCALES - GENERAL
PAINLEVEL_OUTOF10: 3
PAINLEVEL_OUTOF10: 0

## 2021-08-21 ASSESSMENT — PAIN DESCRIPTION - LOCATION: LOCATION: HEAD

## 2021-08-21 ASSESSMENT — PAIN DESCRIPTION - PAIN TYPE: TYPE: ACUTE PAIN

## 2021-08-21 NOTE — PROGRESS NOTES
Hospitalist Progress Note      PCP: No primary care provider on file. Date of Admission: 8/20/2021    Chief Complaint: chest pain    Hospital Course:   27 y.o. male who presented to [facility] with chest pain. Patient was at work today and was doing some heavy lifting. He developed some right lower chest vs RUQ pain. It felt like an achy pain. He became very concerned about this and the pain started migrating towards the center of his chest. He has never had pain like this before. He was told previously he had pre-hypertension but has not been to a doctor in many years. He has no family history for early CAD. He is a non-smoker. He denies fevers, chills, SOB, nausea or diarrhea. Subjective: no new complaints today. BP remains very poorly controlled. Medications:  Reviewed    Infusion Medications    sodium chloride       Scheduled Medications    lisinopril  20 mg Oral Daily    nicotine  1 patch Transdermal Daily    [START ON 8/22/2021] amLODIPine  10 mg Oral Daily    sodium chloride flush  5-40 mL Intravenous 2 times per day    enoxaparin  40 mg Subcutaneous Daily     PRN Meds: sodium chloride flush, sodium chloride, ondansetron **OR** ondansetron, acetaminophen **OR** acetaminophen, labetalol    No intake or output data in the 24 hours ending 08/21/21 1403    Physical Exam Performed:    BP (!) 177/120   Pulse 72   Temp 97.7 °F (36.5 °C) (Oral)   Resp 18   Ht 5' 11\" (1.803 m)   Wt 285 lb (129.3 kg)   SpO2 97%   BMI 39.75 kg/m²     General appearance: No apparent distress, appears stated age and cooperative. HEENT: Pupils equal, round, and reactive to light. Conjunctivae/corneas clear. Neck: Supple, with full range of motion. No jugular venous distention. Trachea midline. Respiratory:  Normal respiratory effort. Clear to auscultation, bilaterally without Rales/Wheezes/Rhonchi. Cardiovascular: Regular rate and rhythm with normal S1/S2 without murmurs, rubs or gallops.   Abdomen: Soft, non-tender, non-distended with normal bowel sounds. Musculoskeletal: No clubbing, cyanosis or edema bilaterally. Full range of motion without deformity. Skin: Skin color, texture, turgor normal.  No rashes or lesions. Neurologic:  Neurovascularly intact without any focal sensory/motor deficits. Cranial nerves: II-XII intact, grossly non-focal.  Psychiatric: Alert and oriented, thought content appropriate, normal insight  Capillary Refill: Brisk,3 seconds, normal   Peripheral Pulses: +2 palpable, equal bilaterally       Labs:   Recent Labs     08/20/21  1239 08/21/21  0635   WBC 8.1 8.0   HGB 15.2 15.0   HCT 44.0 42.8    228     Recent Labs     08/20/21  1239 08/21/21  0636    140   K 4.3 5.0    106   CO2 20* 22   BUN 16 13   CREATININE 0.8* 0.7*   CALCIUM 9.6 9.1     Recent Labs     08/20/21  1239   AST 43*   ALT 46*   BILITOT 0.5   ALKPHOS 167*     No results for input(s): INR in the last 72 hours. Recent Labs     08/20/21  1239 08/20/21  1720 08/20/21  2036   TROPONINI <0.01 <0.01 <0.01       Urinalysis:      Lab Results   Component Value Date    NITRU Negative 08/20/2021    BLOODU Negative 08/20/2021    SPECGRAV >=1.030 08/20/2021    GLUCOSEU Negative 08/20/2021       Radiology:  XR CHEST (2 VW)   Final Result   No acute cardiopulmonary process. CT ABDOMEN PELVIS W IV CONTRAST Additional Contrast? None   Final Result   Cecum resides within the right upper quadrant which may be developmental or   reflect ligamentous laxity. No evidence of appendicitis. Assessment/Plan:    Active Hospital Problems    Diagnosis     Chest pain [R07.9]      Chest pain  - low concern for ACS  - likely related to poorly controlled HTN and anxiety  - EKG, trop negative  - no need for further cardiac testing at this time     Hypertensive urgency  - remains poorly controlled  - started lisinopril, norvasc.  Will start HCTZ tomorrow if remains uncontrolled  - will consult nephrology if BP remains difficult to control for secondary HTN work up     Elevated LFTs  - likely due to HTN  - If not improving, will order further work up     Obesity  With Body mass index is 75.91 kg/m². Complicating assessment and treatment. Placing patient at risk for multiple co-morbidities as well as early death and contributing to the patient's presentation. Counseled on weight loss. DVT Prophylaxis: lovenox  Diet: ADULT DIET;  Regular  Code Status: Full Code    PT/OT Eval Status: not ordered    Dispo - home in 1-2 days    Nan Sena MD

## 2021-08-21 NOTE — PLAN OF CARE
Patient with mid sternal chest pain rated 3 of 10 that does not radiate. Patient receiving new oral blood pressure medications. Rn asked the patient to inform her of any increase in pain or radiating discomfort. Pt verbalized understanding. Patient also educated on a healthy diet and resisting fast food which had fat and sodium. Patient verbalized he needed to watch his diet more closely.

## 2021-08-22 VITALS
HEIGHT: 71 IN | RESPIRATION RATE: 20 BRPM | SYSTOLIC BLOOD PRESSURE: 157 MMHG | WEIGHT: 285 LBS | DIASTOLIC BLOOD PRESSURE: 93 MMHG | HEART RATE: 82 BPM | OXYGEN SATURATION: 97 % | BODY MASS INDEX: 39.9 KG/M2 | TEMPERATURE: 98.1 F

## 2021-08-22 LAB
ALBUMIN SERPL-MCNC: 4.1 G/DL (ref 3.4–5)
ALP BLD-CCNC: 135 U/L (ref 40–129)
ALT SERPL-CCNC: 33 U/L (ref 10–40)
AST SERPL-CCNC: 27 U/L (ref 15–37)
BILIRUB SERPL-MCNC: 0.6 MG/DL (ref 0–1)
BILIRUBIN DIRECT: <0.2 MG/DL (ref 0–0.3)
BILIRUBIN, INDIRECT: ABNORMAL MG/DL (ref 0–1)
EKG ATRIAL RATE: 71 BPM
EKG DIAGNOSIS: NORMAL
EKG P AXIS: 25 DEGREES
EKG P-R INTERVAL: 138 MS
EKG Q-T INTERVAL: 402 MS
EKG QRS DURATION: 92 MS
EKG QTC CALCULATION (BAZETT): 436 MS
EKG R AXIS: 54 DEGREES
EKG T AXIS: 1 DEGREES
EKG VENTRICULAR RATE: 71 BPM
TOTAL PROTEIN: 7.3 G/DL (ref 6.4–8.2)

## 2021-08-22 PROCEDURE — G0378 HOSPITAL OBSERVATION PER HR: HCPCS

## 2021-08-22 PROCEDURE — 36415 COLL VENOUS BLD VENIPUNCTURE: CPT

## 2021-08-22 PROCEDURE — 6360000002 HC RX W HCPCS: Performed by: INTERNAL MEDICINE

## 2021-08-22 PROCEDURE — 96372 THER/PROPH/DIAG INJ SC/IM: CPT

## 2021-08-22 PROCEDURE — 6370000000 HC RX 637 (ALT 250 FOR IP): Performed by: INTERNAL MEDICINE

## 2021-08-22 PROCEDURE — 93010 ELECTROCARDIOGRAM REPORT: CPT | Performed by: INTERNAL MEDICINE

## 2021-08-22 PROCEDURE — 2580000003 HC RX 258: Performed by: INTERNAL MEDICINE

## 2021-08-22 PROCEDURE — 80076 HEPATIC FUNCTION PANEL: CPT

## 2021-08-22 RX ORDER — AMLODIPINE BESYLATE 10 MG/1
10 TABLET ORAL DAILY
Qty: 30 TABLET | Refills: 0 | Status: ON HOLD | OUTPATIENT
Start: 2021-08-23 | End: 2022-07-27 | Stop reason: SDUPTHER

## 2021-08-22 RX ORDER — HYDROCHLOROTHIAZIDE 25 MG/1
25 TABLET ORAL DAILY
Status: DISCONTINUED | OUTPATIENT
Start: 2021-08-22 | End: 2021-08-22 | Stop reason: HOSPADM

## 2021-08-22 RX ORDER — HYDROCHLOROTHIAZIDE 25 MG/1
25 TABLET ORAL DAILY
Qty: 30 TABLET | Refills: 0 | Status: ON HOLD | OUTPATIENT
Start: 2021-08-23 | End: 2022-07-27 | Stop reason: HOSPADM

## 2021-08-22 RX ORDER — LISINOPRIL 20 MG/1
20 TABLET ORAL DAILY
Qty: 30 TABLET | Refills: 0 | Status: ON HOLD | OUTPATIENT
Start: 2021-08-23 | End: 2022-07-27 | Stop reason: SDUPTHER

## 2021-08-22 RX ADMIN — HYDROCHLOROTHIAZIDE 25 MG: 25 TABLET ORAL at 12:29

## 2021-08-22 RX ADMIN — Medication 10 ML: at 08:10

## 2021-08-22 RX ADMIN — LISINOPRIL 20 MG: 20 TABLET ORAL at 08:09

## 2021-08-22 RX ADMIN — AMLODIPINE BESYLATE 10 MG: 5 TABLET ORAL at 08:09

## 2021-08-22 RX ADMIN — ENOXAPARIN SODIUM 40 MG: 40 INJECTION SUBCUTANEOUS at 08:09

## 2021-08-22 ASSESSMENT — PAIN SCALES - GENERAL
PAINLEVEL_OUTOF10: 0

## 2021-08-22 NOTE — PLAN OF CARE
Problem: Falls - Risk of:  Goal: Will remain free from falls  Description: Will remain free from falls  Outcome: Ongoing     Problem: Pain:  Description: Pain management should include both nonpharmacologic and pharmacologic interventions.   Goal: Control of acute pain  Description: Control of acute pain  Outcome: Ongoing     Problem: Skin Integrity:  Goal: Will show no infection signs and symptoms  Description: Will show no infection signs and symptoms  Outcome: Ongoing  Goal: Absence of new skin breakdown  Description: Absence of new skin breakdown  Outcome: Ongoing

## 2021-08-22 NOTE — DISCHARGE SUMMARY
Hospital Medicine Discharge Summary    Patient ID: Bunny Terrell      Patient's PCP: No primary care provider on file. Admit Date: 8/20/2021     Discharge Date:   08/22/21    Admitting Physician: Matheus Maria MD     Discharge Physician: Matheus Maria MD     Discharge Diagnoses: Active Hospital Problems    Diagnosis     Chest pain [R07.9]        The patient was seen and examined on day of discharge and this discharge summary is in conjunction with any daily progress note from day of discharge. Hospital Course:   27 y. o. male who presented to [facility] with chest pain. Patient was at work today and was doing some heavy lifting. He developed some right lower chest vs RUQ pain. It felt like an achy pain. He became very concerned about this and the pain started migrating towards the center of his chest. He has never had pain like this before. He was told previously he had pre-hypertension but has not been to a doctor in many years. He has no family history for early CAD. He is a non-smoker. He denies fevers, chills, SOB, nausea or diarrhea. Chest pain  - low concern for ACS  - likely related to poorly controlled HTN and anxiety  - EKG, trop negative  - no need for further cardiac testing at this time     Hypertensive urgency  - BP control improved. - started lisinopril, norvasc, HCTZ  - may need further titration as outpatient     Elevated LFTs  - likely due to HTN  - improved with supportive care     Obesity  With Body mass index is 72.80 kg/m². Complicating assessment and treatment. Placing patient at risk for multiple co-morbidities as well as early death and contributing to the patient's presentation.  Counseled on weight loss.     Physical Exam Performed:     BP (!) 157/93   Pulse 82   Temp 98.1 °F (36.7 °C)   Resp 20   Ht 5' 11\" (1.803 m)   Wt 285 lb (129.3 kg)   SpO2 97%   BMI 39.75 kg/m²       General appearance: No apparent distress, appears stated age and cooperative. HEENT: Pupils equal, round, and reactive to light. Conjunctivae/corneas clear. Neck: Supple, with full range of motion. No jugular venous distention. Trachea midline. Respiratory:  Normal respiratory effort. Clear to auscultation, bilaterally without Rales/Wheezes/Rhonchi. Cardiovascular: Regular rate and rhythm with normal S1/S2 without murmurs, rubs or gallops. Abdomen: Soft, non-tender, non-distended with normal bowel sounds. Musculoskeletal: No clubbing, cyanosis or edema bilaterally. Full range of motion without deformity. Skin: Skin color, texture, turgor normal.  No rashes or lesions. Neurologic:  Neurovascularly intact without any focal sensory/motor deficits. Cranial nerves: II-XII intact, grossly non-focal.  Psychiatric: Alert and oriented, thought content appropriate, normal insight  Capillary Refill: Brisk,3 seconds, normal   Peripheral Pulses: +2 palpable, equal bilaterally     Labs: For convenience and continuity at follow-up the following most recent labs are provided:      CBC:    Lab Results   Component Value Date    WBC 8.0 08/21/2021    HGB 15.0 08/21/2021    HCT 42.8 08/21/2021     08/21/2021       Renal:    Lab Results   Component Value Date     08/21/2021    K 5.0 08/21/2021     08/21/2021    CO2 22 08/21/2021    BUN 13 08/21/2021    CREATININE 0.7 08/21/2021    CALCIUM 9.1 08/21/2021         Significant Diagnostic Studies    Radiology:   XR CHEST (2 VW)   Final Result   No acute cardiopulmonary process. CT ABDOMEN PELVIS W IV CONTRAST Additional Contrast? None   Final Result   Cecum resides within the right upper quadrant which may be developmental or   reflect ligamentous laxity. No evidence of appendicitis.                 Consults:     IP CONSULT TO HOSPITALIST    Disposition:  home     Condition at Discharge: Stable    Discharge Instructions/Follow-up:  Follow up with PCP within 1-2 weeks    Code Status:  Full Code     Activity: activity as

## 2021-08-22 NOTE — PROGRESS NOTES
Discharged with documented belongings. IV removed without complication. Tele removed and returned. PCP list provided to patient. Patient verbalizes understanding of med list. Denies further needs at this time. Awaiting ride.

## 2022-07-25 ENCOUNTER — APPOINTMENT (OUTPATIENT)
Dept: GENERAL RADIOLOGY | Age: 27
DRG: 243 | End: 2022-07-25
Payer: MEDICAID

## 2022-07-25 ENCOUNTER — APPOINTMENT (OUTPATIENT)
Dept: CT IMAGING | Age: 27
DRG: 243 | End: 2022-07-25
Payer: MEDICAID

## 2022-07-25 ENCOUNTER — HOSPITAL ENCOUNTER (INPATIENT)
Age: 27
LOS: 2 days | Discharge: HOME | DRG: 243 | End: 2022-07-27
Attending: EMERGENCY MEDICINE | Admitting: INTERNAL MEDICINE
Payer: MEDICAID

## 2022-07-25 DIAGNOSIS — R94.31 ELECTROCARDIOGRAM SHOWING T WAVE ABNORMALITIES: ICD-10-CM

## 2022-07-25 DIAGNOSIS — F10.10 ALCOHOL ABUSE: ICD-10-CM

## 2022-07-25 DIAGNOSIS — R00.0 TACHYCARDIA: ICD-10-CM

## 2022-07-25 DIAGNOSIS — K62.5 RECTAL BLEEDING: Primary | ICD-10-CM

## 2022-07-25 DIAGNOSIS — K92.1 MELENA: ICD-10-CM

## 2022-07-25 PROBLEM — D62 ACUTE BLOOD LOSS ANEMIA: Status: ACTIVE | Noted: 2022-07-25

## 2022-07-25 PROBLEM — K92.2 UPPER GI BLEED: Status: ACTIVE | Noted: 2022-07-25

## 2022-07-25 PROBLEM — E87.6 HYPOKALEMIA: Status: ACTIVE | Noted: 2022-07-25

## 2022-07-25 PROBLEM — F19.90 EXCESSIVE USE OF NONSTEROIDAL ANTI-INFLAMMATORY DRUG (NSAID): Status: ACTIVE | Noted: 2022-07-25

## 2022-07-25 LAB
A/G RATIO: 1.6 (ref 1.1–2.2)
ABO/RH: NORMAL
ALBUMIN SERPL-MCNC: 4.3 G/DL (ref 3.4–5)
ALP BLD-CCNC: 167 U/L (ref 40–129)
ALT SERPL-CCNC: 35 U/L (ref 10–40)
ANION GAP SERPL CALCULATED.3IONS-SCNC: 15 MMOL/L (ref 3–16)
ANTIBODY SCREEN: NORMAL
AST SERPL-CCNC: 50 U/L (ref 15–37)
BASOPHILS ABSOLUTE: 0.1 K/UL (ref 0–0.2)
BASOPHILS RELATIVE PERCENT: 1.1 %
BILIRUB SERPL-MCNC: <0.2 MG/DL (ref 0–1)
BUN BLDV-MCNC: 12 MG/DL (ref 7–20)
CALCIUM SERPL-MCNC: 8.8 MG/DL (ref 8.3–10.6)
CHLORIDE BLD-SCNC: 99 MMOL/L (ref 99–110)
CO2: 23 MMOL/L (ref 21–32)
CREAT SERPL-MCNC: 0.8 MG/DL (ref 0.9–1.3)
EOSINOPHILS ABSOLUTE: 0.1 K/UL (ref 0–0.6)
EOSINOPHILS RELATIVE PERCENT: 1.2 %
ETHANOL: 82 MG/DL (ref 0–0.08)
GFR AFRICAN AMERICAN: >60
GFR NON-AFRICAN AMERICAN: >60
GLUCOSE BLD-MCNC: 162 MG/DL (ref 70–99)
HCT VFR BLD CALC: 27.7 % (ref 40.5–52.5)
HCT VFR BLD CALC: 32.3 % (ref 40.5–52.5)
HEMOGLOBIN: 11 G/DL (ref 13.5–17.5)
HEMOGLOBIN: 9.5 G/DL (ref 13.5–17.5)
INR BLD: 0.98 (ref 0.87–1.14)
LIPASE: 25 U/L (ref 13–60)
LYMPHOCYTES ABSOLUTE: 1.9 K/UL (ref 1–5.1)
LYMPHOCYTES RELATIVE PERCENT: 21.2 %
MAGNESIUM: 2.2 MG/DL (ref 1.8–2.4)
MCH RBC QN AUTO: 32.2 PG (ref 26–34)
MCHC RBC AUTO-ENTMCNC: 34.1 G/DL (ref 31–36)
MCV RBC AUTO: 94.3 FL (ref 80–100)
MONOCYTES ABSOLUTE: 0.6 K/UL (ref 0–1.3)
MONOCYTES RELATIVE PERCENT: 6.3 %
NEUTROPHILS ABSOLUTE: 6.4 K/UL (ref 1.7–7.7)
NEUTROPHILS RELATIVE PERCENT: 70.2 %
OCCULT BLOOD SCREENING: ABNORMAL
PDW BLD-RTO: 13.1 % (ref 12.4–15.4)
PLATELET # BLD: 306 K/UL (ref 135–450)
PMV BLD AUTO: 7.5 FL (ref 5–10.5)
POTASSIUM SERPL-SCNC: 3 MMOL/L (ref 3.5–5.1)
PROTHROMBIN TIME: 12.8 SEC (ref 11.7–14.5)
RBC # BLD: 3.42 M/UL (ref 4.2–5.9)
SODIUM BLD-SCNC: 137 MMOL/L (ref 136–145)
SPECIMEN STATUS: NORMAL
TOTAL PROTEIN: 7 G/DL (ref 6.4–8.2)
TROPONIN: <0.01 NG/ML
WBC # BLD: 9.1 K/UL (ref 4–11)

## 2022-07-25 PROCEDURE — 85018 HEMOGLOBIN: CPT

## 2022-07-25 PROCEDURE — 2580000003 HC RX 258: Performed by: PHYSICIAN ASSISTANT

## 2022-07-25 PROCEDURE — 6360000004 HC RX CONTRAST MEDICATION: Performed by: PHYSICIAN ASSISTANT

## 2022-07-25 PROCEDURE — 86901 BLOOD TYPING SEROLOGIC RH(D): CPT

## 2022-07-25 PROCEDURE — 71045 X-RAY EXAM CHEST 1 VIEW: CPT

## 2022-07-25 PROCEDURE — 6370000000 HC RX 637 (ALT 250 FOR IP)

## 2022-07-25 PROCEDURE — 83690 ASSAY OF LIPASE: CPT

## 2022-07-25 PROCEDURE — 85014 HEMATOCRIT: CPT

## 2022-07-25 PROCEDURE — C9113 INJ PANTOPRAZOLE SODIUM, VIA: HCPCS | Performed by: PHYSICIAN ASSISTANT

## 2022-07-25 PROCEDURE — 93005 ELECTROCARDIOGRAM TRACING: CPT | Performed by: PHYSICIAN ASSISTANT

## 2022-07-25 PROCEDURE — 83735 ASSAY OF MAGNESIUM: CPT

## 2022-07-25 PROCEDURE — 85610 PROTHROMBIN TIME: CPT

## 2022-07-25 PROCEDURE — 80053 COMPREHEN METABOLIC PANEL: CPT

## 2022-07-25 PROCEDURE — 84484 ASSAY OF TROPONIN QUANT: CPT

## 2022-07-25 PROCEDURE — 6370000000 HC RX 637 (ALT 250 FOR IP): Performed by: PHYSICIAN ASSISTANT

## 2022-07-25 PROCEDURE — 99285 EMERGENCY DEPT VISIT HI MDM: CPT

## 2022-07-25 PROCEDURE — 96361 HYDRATE IV INFUSION ADD-ON: CPT

## 2022-07-25 PROCEDURE — 85025 COMPLETE CBC W/AUTO DIFF WBC: CPT

## 2022-07-25 PROCEDURE — 86900 BLOOD TYPING SEROLOGIC ABO: CPT

## 2022-07-25 PROCEDURE — 82077 ASSAY SPEC XCP UR&BREATH IA: CPT

## 2022-07-25 PROCEDURE — 96374 THER/PROPH/DIAG INJ IV PUSH: CPT

## 2022-07-25 PROCEDURE — 6360000002 HC RX W HCPCS: Performed by: PHYSICIAN ASSISTANT

## 2022-07-25 PROCEDURE — 86850 RBC ANTIBODY SCREEN: CPT

## 2022-07-25 PROCEDURE — 1200000000 HC SEMI PRIVATE

## 2022-07-25 PROCEDURE — 74177 CT ABD & PELVIS W/CONTRAST: CPT

## 2022-07-25 PROCEDURE — 6360000002 HC RX W HCPCS

## 2022-07-25 PROCEDURE — 82270 OCCULT BLOOD FECES: CPT

## 2022-07-25 RX ORDER — LORAZEPAM 2 MG/ML
4 INJECTION INTRAMUSCULAR
Status: DISCONTINUED | OUTPATIENT
Start: 2022-07-25 | End: 2022-07-27 | Stop reason: HOSPADM

## 2022-07-25 RX ORDER — NICOTINE 21 MG/24HR
1 PATCH, TRANSDERMAL 24 HOURS TRANSDERMAL DAILY
Status: DISCONTINUED | OUTPATIENT
Start: 2022-07-26 | End: 2022-07-27 | Stop reason: HOSPADM

## 2022-07-25 RX ORDER — LORAZEPAM 1 MG/1
1 TABLET ORAL
Status: DISCONTINUED | OUTPATIENT
Start: 2022-07-25 | End: 2022-07-27 | Stop reason: HOSPADM

## 2022-07-25 RX ORDER — SODIUM CHLORIDE 9 MG/ML
INJECTION, SOLUTION INTRAVENOUS PRN
Status: DISCONTINUED | OUTPATIENT
Start: 2022-07-25 | End: 2022-07-27 | Stop reason: HOSPADM

## 2022-07-25 RX ORDER — SODIUM CHLORIDE 0.9 % (FLUSH) 0.9 %
5-40 SYRINGE (ML) INJECTION PRN
Status: DISCONTINUED | OUTPATIENT
Start: 2022-07-25 | End: 2022-07-27 | Stop reason: HOSPADM

## 2022-07-25 RX ORDER — LORAZEPAM 2 MG/ML
1 INJECTION INTRAMUSCULAR
Status: DISCONTINUED | OUTPATIENT
Start: 2022-07-25 | End: 2022-07-27 | Stop reason: HOSPADM

## 2022-07-25 RX ORDER — LORAZEPAM 1 MG/1
3 TABLET ORAL
Status: DISCONTINUED | OUTPATIENT
Start: 2022-07-25 | End: 2022-07-27 | Stop reason: HOSPADM

## 2022-07-25 RX ORDER — PROMETHAZINE HYDROCHLORIDE 25 MG/1
12.5 TABLET ORAL EVERY 6 HOURS PRN
Status: DISCONTINUED | OUTPATIENT
Start: 2022-07-25 | End: 2022-07-27 | Stop reason: HOSPADM

## 2022-07-25 RX ORDER — LORAZEPAM 1 MG/1
2 TABLET ORAL
Status: DISCONTINUED | OUTPATIENT
Start: 2022-07-25 | End: 2022-07-27 | Stop reason: HOSPADM

## 2022-07-25 RX ORDER — ACETAMINOPHEN 325 MG/1
650 TABLET ORAL EVERY 6 HOURS PRN
Status: DISCONTINUED | OUTPATIENT
Start: 2022-07-25 | End: 2022-07-27 | Stop reason: HOSPADM

## 2022-07-25 RX ORDER — ACETAMINOPHEN 325 MG/1
650 TABLET ORAL ONCE
Status: COMPLETED | OUTPATIENT
Start: 2022-07-25 | End: 2022-07-25

## 2022-07-25 RX ORDER — LORAZEPAM 2 MG/ML
2 INJECTION INTRAMUSCULAR
Status: DISCONTINUED | OUTPATIENT
Start: 2022-07-25 | End: 2022-07-27 | Stop reason: HOSPADM

## 2022-07-25 RX ORDER — PANTOPRAZOLE SODIUM 40 MG/10ML
40 INJECTION, POWDER, LYOPHILIZED, FOR SOLUTION INTRAVENOUS ONCE
Status: COMPLETED | OUTPATIENT
Start: 2022-07-25 | End: 2022-07-25

## 2022-07-25 RX ORDER — LORAZEPAM 2 MG/ML
3 INJECTION INTRAMUSCULAR
Status: DISCONTINUED | OUTPATIENT
Start: 2022-07-25 | End: 2022-07-27 | Stop reason: HOSPADM

## 2022-07-25 RX ORDER — CHLORDIAZEPOXIDE HYDROCHLORIDE 5 MG/1
10 CAPSULE, GELATIN COATED ORAL 3 TIMES DAILY
Status: DISCONTINUED | OUTPATIENT
Start: 2022-07-26 | End: 2022-07-27

## 2022-07-25 RX ORDER — 0.9 % SODIUM CHLORIDE 0.9 %
1000 INTRAVENOUS SOLUTION INTRAVENOUS ONCE
Status: COMPLETED | OUTPATIENT
Start: 2022-07-25 | End: 2022-07-25

## 2022-07-25 RX ORDER — POTASSIUM CHLORIDE 7.45 MG/ML
10 INJECTION INTRAVENOUS
Status: COMPLETED | OUTPATIENT
Start: 2022-07-25 | End: 2022-07-26

## 2022-07-25 RX ORDER — LORAZEPAM 1 MG/1
4 TABLET ORAL
Status: DISCONTINUED | OUTPATIENT
Start: 2022-07-25 | End: 2022-07-27 | Stop reason: HOSPADM

## 2022-07-25 RX ORDER — SODIUM CHLORIDE 0.9 % (FLUSH) 0.9 %
5-40 SYRINGE (ML) INJECTION EVERY 12 HOURS SCHEDULED
Status: DISCONTINUED | OUTPATIENT
Start: 2022-07-25 | End: 2022-07-27 | Stop reason: HOSPADM

## 2022-07-25 RX ORDER — LANOLIN ALCOHOL/MO/W.PET/CERES
100 CREAM (GRAM) TOPICAL DAILY
Status: DISCONTINUED | OUTPATIENT
Start: 2022-07-25 | End: 2022-07-27 | Stop reason: HOSPADM

## 2022-07-25 RX ORDER — SODIUM CHLORIDE 9 MG/ML
INJECTION, SOLUTION INTRAVENOUS CONTINUOUS
Status: DISCONTINUED | OUTPATIENT
Start: 2022-07-25 | End: 2022-07-26

## 2022-07-25 RX ORDER — SODIUM CHLORIDE 0.9 % (FLUSH) 0.9 %
5-40 SYRINGE (ML) INJECTION EVERY 12 HOURS SCHEDULED
Status: DISCONTINUED | OUTPATIENT
Start: 2022-07-25 | End: 2022-07-27 | Stop reason: SDUPTHER

## 2022-07-25 RX ORDER — SODIUM CHLORIDE 0.9 % (FLUSH) 0.9 %
5-40 SYRINGE (ML) INJECTION PRN
Status: DISCONTINUED | OUTPATIENT
Start: 2022-07-25 | End: 2022-07-27 | Stop reason: SDUPTHER

## 2022-07-25 RX ADMIN — IOPAMIDOL 75 ML: 755 INJECTION, SOLUTION INTRAVENOUS at 16:15

## 2022-07-25 RX ADMIN — LORAZEPAM 2 MG: 1 TABLET ORAL at 18:49

## 2022-07-25 RX ADMIN — LORAZEPAM 1 MG: 1 TABLET ORAL at 20:06

## 2022-07-25 RX ADMIN — LORAZEPAM 3 MG: 1 TABLET ORAL at 20:54

## 2022-07-25 RX ADMIN — SODIUM CHLORIDE, PRESERVATIVE FREE 10 ML: 5 INJECTION INTRAVENOUS at 21:18

## 2022-07-25 RX ADMIN — ACETAMINOPHEN 650 MG: 325 TABLET ORAL at 18:48

## 2022-07-25 RX ADMIN — Medication 100 MG: at 16:39

## 2022-07-25 RX ADMIN — POTASSIUM CHLORIDE 10 MEQ: 7.46 INJECTION, SOLUTION INTRAVENOUS at 21:31

## 2022-07-25 RX ADMIN — POTASSIUM CHLORIDE 10 MEQ: 7.46 INJECTION, SOLUTION INTRAVENOUS at 22:23

## 2022-07-25 RX ADMIN — SODIUM CHLORIDE 1000 ML: 9 INJECTION, SOLUTION INTRAVENOUS at 16:00

## 2022-07-25 RX ADMIN — LORAZEPAM 4 MG: 1 TABLET ORAL at 22:18

## 2022-07-25 RX ADMIN — PANTOPRAZOLE SODIUM 40 MG: 40 INJECTION, POWDER, FOR SOLUTION INTRAVENOUS at 19:16

## 2022-07-25 RX ADMIN — POTASSIUM CHLORIDE 10 MEQ: 7.46 INJECTION, SOLUTION INTRAVENOUS at 23:29

## 2022-07-25 RX ADMIN — LORAZEPAM 1 MG: 1 TABLET ORAL at 17:55

## 2022-07-25 RX ADMIN — CHLORDIAZEPOXIDE HYDROCHLORIDE 10 MG: 5 CAPSULE ORAL at 23:47

## 2022-07-25 ASSESSMENT — ENCOUNTER SYMPTOMS
DIARRHEA: 0
ABDOMINAL DISTENTION: 1
COUGH: 0
CHEST TIGHTNESS: 0
BACK PAIN: 0
SHORTNESS OF BREATH: 1
VOMITING: 1
NAUSEA: 1
BLOOD IN STOOL: 1
SORE THROAT: 0
ABDOMINAL PAIN: 1

## 2022-07-25 ASSESSMENT — PAIN - FUNCTIONAL ASSESSMENT: PAIN_FUNCTIONAL_ASSESSMENT: NONE - DENIES PAIN

## 2022-07-25 ASSESSMENT — PAIN DESCRIPTION - DESCRIPTORS: DESCRIPTORS: ACHING

## 2022-07-25 ASSESSMENT — PAIN SCALES - GENERAL: PAINLEVEL_OUTOF10: 6

## 2022-07-25 ASSESSMENT — PAIN DESCRIPTION - PAIN TYPE: TYPE: ACUTE PAIN

## 2022-07-25 ASSESSMENT — PAIN DESCRIPTION - FREQUENCY: FREQUENCY: CONTINUOUS

## 2022-07-25 ASSESSMENT — PAIN DESCRIPTION - LOCATION: LOCATION: HEAD

## 2022-07-25 ASSESSMENT — PAIN DESCRIPTION - ONSET: ONSET: ON-GOING

## 2022-07-25 NOTE — H&P
History & Physical      PCP: No primary care provider on file. Date of Admission: 7/25/2022    Date of Service: Pt seen/examined on 7/25/2022 and Admitted to Inpatient with expected LOS greater than two midnights due to medical therapy. Chief Complaint   Patient presents with    Rectal Bleeding     Along with vomiting dark emesis for 4 days. Pt admits to drinking 750 ml of hard liquor daily    Fatigue     Started today       History Of Present Illness:   32 y.o. male with a history of alcohol use who presented to [facility] with 4 days of black tarry stools. Patient also reports 2 nights ago he had emesis of the dark red/black color. He made himself vomit again yesterday to see if it was still the same color and it was his normal food. Patient describes that he has had some abdominal discomfort that occurred today, but nothing that he would describe as pain. Chung Kumari He thinks he has a generally high pain tolerance. Additionally today patient did have some lightheadedness. He continues to have some nausea but no emesis. Throughout the past few days he has had normal appetite but does say he has not eaten much today. Denies any headache, fever, chills, chest pain, or shortness of breath. Patient reports that he has generalized body aches and when he goes to work as a . Patient reports taking a large amount of ibuprofen, change milligram tablets 6 tablets once a day 3 to 4 days a week for the past 3 months. Patient does consume large amounts of alcohol 1 L of malt liquor/hard liquor for the last year almost every day. He does state about a week ago he did not drink for 3 days and did endorse increased anxiety and trouble sleeping but denies any murmurs. His alcohol intake prior has been more than 2 drinks per night for the last 2 to 3 years. He states that he knows this is a problem and one of the reasons he presented to the ED, he would like to get help quitting.  He has scheduled with a new PCP but cannot be seen until November. He also smokes weed. ED Course: Patient presented to the ED with elevated /90, tachycardia 122, afebrile, RR 18, O2 sats 95% on room air. Labs significant for new anemia at 11.0 with baseline 1 year ago around 15. Hypokalemia 3.0. No leukocytosis, creatinine 0.8 at baseline. Hyperglycemia 162, elevated alk phos 167 elevated AST 50. Occult blood positive. CXR is negative for acute cardiopulmonary process. CT abdomen and pelvis with contrast was a limited study due to inappropriately delayed phases but no gross evidence of extravasation of contrast.  No acute pathology of the abdomen or pelvis. GI was consulted with plans for endoscopic in the morning. Patient has been given 40 mg pantoprazole IV. CIWA protocol also in place and patient has received 3 times while in the ED. Past Medical History:          Diagnosis Date    Anxiety     Depression     Headache(784.0)     Substance abuse ([facility] [address]        Past Surgical History:          Procedure Laterality Date    FRACTURE SURGERY         Medications Prior to Admission:      Prior to Admission medications    Medication Sig Start Date End Date Taking? Authorizing Provider   lisinopril (PRINIVIL;ZESTRIL) 20 MG tablet Take 1 tablet by mouth daily  Patient not taking: Reported on 7/25/2022 8/23/21   Jos Rodriguez MD   amLODIPine (NORVASC) 10 MG tablet Take 1 tablet by mouth daily  Patient not taking: Reported on 7/25/2022 8/23/21   Jos Rodriguez MD   hydroCHLOROthiazide (HYDRODIURIL) 25 MG tablet Take 1 tablet by mouth daily  Patient not taking: Reported on 7/25/2022 8/23/21   Jos Rodriguez MD       Allergies:  Buspar [buspirone] and Paroxetine    Social History:      The patient currently lives at home. TOBACCO:   reports that he has never smoked. He quit smokeless tobacco use about 7 years ago. His smokeless tobacco use included chew. ETOH:   reports current alcohol use.   E-cigarette/Vaping Questions Responses    E-cigarette/Vaping Use Current Every Day User    Start Date     Passive Exposure     Quit Date     Counseling Given     Comments               Family History:      Reviewed in detail and negative for DM, CAD, Cancer, CVA. Positive as follows:        Problem Relation Age of Onset    Other Mother         amyloidosis    High Blood Pressure Father        REVIEW OF SYSTEMS COMPLETED:   Pertinent positives as noted in the HPI. All other systems reviewed and negative. PHYSICAL EXAM PERFORMED:    BP (!) 162/82   Pulse (!) 113   Temp 98.6 °F (37 °C) (Oral)   Resp 24   Ht 6' (1.829 m)   Wt (!) 340 lb (154.2 kg)   SpO2 98%   BMI 46.11 kg/m²     General appearance:  No apparent distress, appears stated age and cooperative. HEENT:  Normal cephalic, atraumatic without obvious deformity. Pupils equal, round, and reactive to light. Extra ocular muscles intact. Conjunctivae/corneas clear. Neck: Supple, with full range of motion. No jugular venous distention. Trachea midline. Respiratory:  Normal respiratory effort. Clear to auscultation, bilaterally without Rales/Wheezes/Rhonchi. Cardiovascular:  Regular rate and rhythm with normal S1/S2 without murmurs, rubs or gallops. Abdomen: Soft, non-tender, non-distended with normal bowel sounds. Musculoskeletal:  No clubbing, cyanosis or edema bilaterally. Full range of motion without deformity. Skin: Skin color, texture, turgor normal.  No rashes or lesions. Neurologic:  Neurovascularly intact without any focal sensory/motor deficits.  Cranial nerves: II-XII intact, grossly non-focal.  Psychiatric:  Alert and oriented, thought content appropriate, normal insight  Capillary Refill: Brisk,3 seconds, normal  Peripheral Pulses: +2 palpable, equal bilaterally       Labs:     Recent Labs     07/25/22  1528   WBC 9.1   HGB 11.0*   HCT 32.3*        Recent Labs     07/25/22  1528      K 3.0*   CL 99   CO2 23   BUN 12   CREATININE 0.8*   CALCIUM 8.8     Recent Labs     07/25/22  1528   AST 50*   ALT 35   BILITOT <0.2   ALKPHOS 167*     No results for input(s): INR in the last 72 hours. Recent Labs     07/25/22  1528   TROPONINI <0.01       Radiology:     CXR: I have reviewed the CXR with the following interpretation: no acute cardiopulmonary process  EKG:  I have reviewed the EKG with the following interpretation: sinus tachycardia rate 113, normal axis, prolonged Qtc, inferior/lateral T wave inversion noted, lateral T wave inversion is new from  previous EKG 8/21/2021. CT ABDOMEN PELVIS W IV CONTRAST Additional Contrast? None   Final Result   Limited study to evaluate for evidence of active bleeding secondary to   inappropriately delayed phases. No gross evidence of active extravasation of   contrast.      Otherwise no acute pathology in the abdomen and pelvis. XR CHEST PORTABLE   Final Result   No radiographic evidence of acute pulmonary disease. ASSESSMENT:    Active Hospital Problems    Diagnosis Date Noted    Upper GI bleed [K92.2] 07/25/2022     Priority: Medium    Alcohol dependence ([facility] [address] [F10.20] 07/25/2022     Priority: Medium    Hypokalemia [E87.6] 07/25/2022     Priority: Medium    Excessive use of nonsteroidal anti-inflammatory drug (NSAID) [F19.90] 07/25/2022     Priority: Medium    Acute blood loss anemia [D62] 07/25/2022     Priority: Medium         PLAN:  Acute upper GI Bleed  - Likely due to excessive use of NSAIDs causing PUD. Unlikely variceal bleed despite heavy alcohol use as patient is young and without further hematemesis.    - Pt given 40 mg pantoprazole IV once, will continue BID  - GI consulted, plans to endoscope in AM  - While NPO, continuous fluids  - Will avoid further NSAID use, pt educated to further avoid in future  - PT/INR ordered    Acute blood loss anemia  - Initial 11.0, baseline ~15  - Likely 2/2 to upper GI bleed as above  - Sample does have hemolysis with elevated LDH, will repeat in AM  - ctm H/H q 6 hours, will transfuse Hb < 7.0    Hypokalemia  Prolonged QT interval  - Initial: 3.0 and new T-wave inversions and prolonged QT interval on EKG  - Unclear etiology, patient is not taking prescribed diuretics  - NPO status, will administer KCl IV for 50 mEq total  - Avoid QT prolonging agents  - Patient has already received bolus of NS, urine Na will be falsely elevated  - Will order urine K to evaluate for renal losses  - ctm BMP, order Mag    Elevated Alkaline Phosphatase  - Unclear etiology as liver enzymes are near wnl  - Will order GGT to evaluate source of alkaline phosphatase    Alcohol Dependence  - CIWA protocol in place  - Patient requiring increasing doses of ativan since arrival. Will add scheduled chlordiazepoxide 10 mg TID, hold parameters for CIWA < 10  - Thiamine daily  - Patient is aware of the risks of continued heavy drinking and does want to stop. Will consult social work for resources. Also advised patient to make new pt appointment with another office sooner    HTN  - Possibly 2/2 undiagnosed DELPHINE with patient meeting several STOP-BANG criteria  - Patient not taking medications prescribed from last admission: lisinopril 20 mg, amlodipine 10 mg, and HCTZ 25 mg  - Due to GI bleed, will hold medications for now. Consider adding ACE-I  - Patient will benefit from sleep study outpatient    Obesity, Hyperglycemia  - A1c in AM  - Will need to  pt on risks of obesity and encourage weight loss      DVT Prophylaxis: SCDs  Diet: Diet NPO Exceptions are: Ice Chips  Code Status: Full Code    PT/OT Eval Status: Ambulatory    Dispo - 2-3 days pending clinical improvement      Mehnaz Casanova MD  PGY-2    Thank you No primary care provider on file. for the opportunity to be involved in this patient's care. If you have any questions or concerns please feel free to contact me at [phone]. I saw the patient independently from the resident . I discussed the care with the resident.  I personally reviewed the HPI, PH, FH, SH, ROS and medications. I repeated pertinent portions of the examination and reviewed the relevant imaging and laboratory data. I agree with the findings, assessment and plan as documented.  addition to: Plan as above

## 2022-07-25 NOTE — LETTER
[facility] SURG[facility]  [address] [address]  Phone: [phone]    Naina Stone MD      July 27, 2022     Patient: Marielle Boswell   YOB: 1995   Date of Visit: 7/25/2022       To Whom it May Concern:    Marielle Boswell was hospitalized at [facility] from 7/25/2022 through 7/27/2022. He may return to work on 8/1/2022 with no further restrictions. .    If you have any questions or concerns, please don't hesitate to call.     Sincerely,         Naina Stone MD

## 2022-07-25 NOTE — ED NOTES
PT's CIWA increased from 6 to 10 over last hour. Pt given 1 mg PO Ativan as prescribed. Pt resting at this time.      Jere García RN  07/25/22 2610

## 2022-07-25 NOTE — ED PROVIDER NOTES
**ADVANCED PRACTICE PROVIDER, I HAVE EVALUATED THIS [facility]  ED  EMERGENCY DEPARTMENT ENCOUNTER      Pt Name: Kale Eaton  PUE:[id number]  Darengfdaniel 1995  Date of evaluation: 7/25/2022  Provider: CARLYLE Hensley      Chief Complaint:    Chief Complaint   Patient presents with    Rectal Bleeding     Along with vomiting dark emesis for 4 days. Pt admits to drinking 750 ml of hard liquor daily    Fatigue     Started today         Nursing Notes, Past Medical Hx, Past Surgical Hx, Social Hx, Allergies, and Family Hx were all reviewed and agreed with or any disagreements were addressed in the HPI.    HPI: (Location, Duration, Timing, Severity, Quality, Assoc Sx, Context, Modifying factors)    Chief Complaint of rectal bleeding and hematemesis. This is a  32 y.o. male who presents via private vehicle complaining of rectal bleeding, hematemesis, fatigue and shortness of breath. The patient has past medical history of elevated blood pressure but is not on any medications. He states he currently drinks about 750 mL of malt liquor and hard liquor daily. He states he primarily drinks Gin if drinking hard liquor. He also admits to taking ibuprofen multiple times a week. He states over the last 4 days he has had dark coffee ground stools. He has had several bouts of vomiting stating it looks like coffee-ground emesis as well. He states beginning today he has felt lightheaded, tired, and short of breath. He denies a history of GI bleed. He does have epigastric pain. He denies any recent fevers, chills, chest pain. He denies any dysuria or hematuria. He is not on any blood thinners or any daily medications.     PastMedical/Surgical History:      Diagnosis Date    Anxiety     Depression     Headache(784.0)     Substance abuse ([facility] [address]          Procedure Laterality Date    FRACTURE SURGERY         Medications:  Previous Medications    AMLODIPINE (NORVASC) 10 MG TABLET    Take 1 tablet by mouth daily    HYDROCHLOROTHIAZIDE (HYDRODIURIL) 25 MG TABLET    Take 1 tablet by mouth daily    LISINOPRIL (PRINIVIL;ZESTRIL) 20 MG TABLET    Take 1 tablet by mouth daily         Review of Systems:  (2-9 systems needed)  Review of Systems   Constitutional:  Positive for fatigue. Negative for chills and fever. HENT:  Negative for congestion, nosebleeds and sore throat. Eyes:  Negative for visual disturbance. Respiratory:  Positive for shortness of breath. Negative for cough and chest tightness. Cardiovascular:  Negative for chest pain and palpitations. Gastrointestinal:  Positive for abdominal distention, abdominal pain, blood in stool, nausea and vomiting. Negative for diarrhea. Genitourinary:  Negative for dysuria, frequency, hematuria and urgency. Musculoskeletal:  Negative for back pain and neck pain. Skin:  Negative for rash. Neurological:  Negative for dizziness, weakness, light-headedness and headaches. Physical Exam:  Physical Exam  Vitals and nursing note reviewed. Constitutional:       Appearance: Normal appearance. He is not diaphoretic. HENT:      Head: Normocephalic and atraumatic. Nose: Nose normal.      Mouth/Throat:      Mouth: Mucous membranes are moist.   Eyes:      General:         Right eye: No discharge. Left eye: No discharge. Extraocular Movements: Extraocular movements intact. Pupils: Pupils are equal, round, and reactive to light. Cardiovascular:      Rate and Rhythm: Regular rhythm. Tachycardia present. Pulses: Normal pulses. Heart sounds: Normal heart sounds. No murmur heard. No friction rub. No gallop. Pulmonary:      Effort: Pulmonary effort is normal. No respiratory distress. Breath sounds: Normal breath sounds. No stridor. No wheezing, rhonchi or rales. Abdominal:      General: Abdomen is flat. Palpations: Abdomen is soft. Tenderness:  There is generalized abdominal tenderness and tenderness in the right upper quadrant and epigastric area. There is no guarding or rebound. Musculoskeletal:         General: Normal range of motion. Cervical back: Normal range of motion and neck supple. Skin:     General: Skin is warm and dry. Coloration: Skin is not pale. Neurological:      Mental Status: He is alert and oriented to person, place, and time.    Psychiatric:         Mood and Affect: Mood normal.         Behavior: Behavior normal.       MEDICAL DECISION MAKING    Vitals:    Vitals:    07/25/22 1944 07/25/22 1945 07/25/22 2050 07/25/22 2051   BP: (!) 162/82 (!) 162/82 (!) 143/85 (!) 143/85   Pulse: (!) 115 (!) 113 (!) 108 (!) 107   Resp:   22    Temp:       TempSrc:       SpO2: 98%  100%    Weight:       Height:           LABS:  Labs Reviewed   CBC WITH AUTO DIFFERENTIAL - Abnormal; Notable for the following components:       Result Value    RBC 3.42 (*)     Hemoglobin 11.0 (*)     Hematocrit 32.3 (*)     All other components within normal limits   COMPREHENSIVE METABOLIC PANEL - Abnormal; Notable for the following components:    Potassium 3.0 (*)     Glucose 162 (*)     Creatinine 0.8 (*)     Alkaline Phosphatase 167 (*)     AST 50 (*)     All other components within normal limits   BLOOD OCCULT STOOL SCREEN #1 - Abnormal; Notable for the following components:    Occult Blood Screening   (*)     Value: Result: POSITIVE  Normal range: Negative      All other components within normal limits    Narrative:     ORDER#: [id number]                          ORDERED BY: TIAN ALFONSO  SOURCE: Stool                              COLLECTED:  07/25/22 17:46  ANTIBIOTICS AT [facility].:                      RECEIVED :  07/25/22 18:03   HEMOGLOBIN AND HEMATOCRIT - Abnormal; Notable for the following components:    Hemoglobin 9.5 (*)     Hematocrit 27.7 (*)     All other components within normal limits   SAMPLE POSSIBLE BLOOD BANK TESTING   LIPASE   ETHANOL   TROPONIN   PROTIME-INR   HEMOGLOBIN AND HEMATOCRIT HEMOGLOBIN AND HEMATOCRIT   TYPE AND SCREEN        Remainder of labs reviewed and were negative at this time or not returned at the time of this note. RADIOLOGY:   Non-plain film images such as CT, Ultrasound and MRI are read by the radiologist. CARLYLE Carrera have directly visualized the radiologic plain film image(s) with the below findings:      Interpretation per the Radiologist below, if available at the time of this note:    CT ABDOMEN PELVIS W IV CONTRAST Additional Contrast? None   Final Result   Limited study to evaluate for evidence of active bleeding secondary to   inappropriately delayed phases. No gross evidence of active extravasation of   contrast.      Otherwise no acute pathology in the abdomen and pelvis. XR CHEST PORTABLE   Final Result   No radiographic evidence of acute pulmonary disease. MEDICAL DECISION MAKING / ED COURSE:      Patient was evaluated in the emergency department today for concern of rectal bleeding and hematemesis. Patient is tachycardic upon arrival with a heart rate of 122. He does admit to drinking about 500 mL of alcohol today. Work-up results include:  Positive stool occult. CBC without evidence of leukocytosis. Hemoglobin is stable at 11.0 although this is a four-point drop compared to lab work about 1 year ago. Ethanol is [de-identified] 2C was activated. Lipase is 25  Patient is O+  Troponin is less than 0.01  INR is 0.98  EKG interpreted by attending physician found to have T wave abnormality. Chest x-ray shows no acute cardiopulmonary abnormality. CT abdomen and pelvis shows no acute intra-abdominal abnormality. Given concerns of active GI bleed and recommending admission. I did consult with GI and spoke with Dr. Kathia Emerson who recommended hospitalist admission and scope tomorrow. A discussion was had with the patient regarding all lab and imaging results. We discussed treatment plan of admission he is in agreement treatment plan. Hospitalist boluses consult at this time. The patient was also evaluated by attending physician who agreed with treatment plan. Patient was given:  Medications   sodium chloride flush 0.9 % injection 5-40 mL (10 mLs IntraVENous Given 7/25/22 2118)   sodium chloride flush 0.9 % injection 5-40 mL (has no administration in time range)   0.9 % sodium chloride infusion (has no administration in time range)   thiamine tablet 100 mg (100 mg Oral Given 7/25/22 1639)   LORazepam (ATIVAN) tablet 1 mg ( Oral See Alternative 7/25/22 2054)     Or   LORazepam (ATIVAN) injection 1 mg ( IntraVENous See Alternative 7/25/22 2054)     Or   LORazepam (ATIVAN) tablet 2 mg ( Oral See Alternative 7/25/22 2054)     Or   LORazepam (ATIVAN) injection 2 mg ( IntraVENous See Alternative 7/25/22 2054)     Or   LORazepam (ATIVAN) tablet 3 mg (3 mg Oral Given 7/25/22 2054)     Or   LORazepam (ATIVAN) injection 3 mg ( IntraVENous See Alternative 7/25/22 2054)     Or   LORazepam (ATIVAN) tablet 4 mg ( Oral See Alternative 7/25/22 2054)     Or   LORazepam (ATIVAN) injection 4 mg ( IntraVENous See Alternative 7/25/22 2054)   nicotine (NICODERM CQ) 7 MG/24HR 1 patch (1 patch TransDERmal Patch Applied 7/25/22 1745)   promethazine (PHENERGAN) tablet 12.5 mg (has no administration in time range)   potassium chloride 10 mEq/100 mL IVPB (Peripheral Line) (has no administration in time range)   0.9 % sodium chloride bolus (0 mLs IntraVENous Stopped 7/25/22 1801)   iopamidol (ISOVUE-370) 76 % injection 75 mL (75 mLs IntraVENous Given 7/25/22 1615)   acetaminophen (TYLENOL) tablet 650 mg (650 mg Oral Given 7/25/22 1848)   pantoprazole (PROTONIX) injection 40 mg (40 mg IntraVENous Given 7/25/22 1916)         The patient tolerated their visit well. I evaluated the patient. The physician was available for consultation as needed.   The patient and / or the family were informed of the results of any tests, a time was given to answer questions, a plan was proposed and they agreed with plan. CLINICAL IMPRESSION:  1. Rectal bleeding    2. Electrocardiogram showing T wave abnormalities    3. Alcohol abuse    4.  Tachycardia      Results for orders placed or performed during the hospital encounter of 07/25/22   CBC with Auto Differential   Result Value Ref Range    WBC 9.1 4.0 - 11.0 K/uL    RBC 3.42 (L) 4.20 - 5.90 M/uL    Hemoglobin 11.0 (L) 13.5 - 17.5 g/dL    Hematocrit 32.3 (L) 40.5 - 52.5 %    MCV 94.3 80.0 - 100.0 fL    MCH 32.2 26.0 - 34.0 pg    MCHC 34.1 31.0 - 36.0 g/dL    RDW 13.1 12.4 - 15.4 %    Platelets 156 556 - 530 K/uL    MPV 7.5 5.0 - 10.5 fL    Neutrophils % 70.2 %    Lymphocytes % 21.2 %    Monocytes % 6.3 %    Eosinophils % 1.2 %    Basophils % 1.1 %    Neutrophils Absolute 6.4 1.7 - 7.7 K/uL    Lymphocytes Absolute 1.9 1.0 - 5.1 K/uL    Monocytes Absolute 0.6 0.0 - 1.3 K/uL    Eosinophils Absolute 0.1 0.0 - 0.6 K/uL    Basophils Absolute 0.1 0.0 - 0.2 K/uL   Comprehensive Metabolic Panel   Result Value Ref Range    Sodium 137 136 - 145 mmol/L    Potassium 3.0 (L) 3.5 - 5.1 mmol/L    Chloride 99 99 - 110 mmol/L    CO2 23 21 - 32 mmol/L    Anion Gap 15 3 - 16    Glucose 162 (H) 70 - 99 mg/dL    BUN 12 7 - 20 mg/dL    Creatinine 0.8 (L) 0.9 - 1.3 mg/dL    GFR Non-African American >60 >60    GFR African American >60 >60    Calcium 8.8 8.3 - 10.6 mg/dL    Total Protein 7.0 6.4 - 8.2 g/dL    Albumin 4.3 3.4 - 5.0 g/dL    Albumin/Globulin Ratio 1.6 1.1 - 2.2    Total Bilirubin <0.2 0.0 - 1.0 mg/dL    Alkaline Phosphatase 167 (H) 40 - 129 U/L    ALT 35 10 - 40 U/L    AST 50 (H) 15 - 37 U/L   Sample possible blood bank testing   Result Value Ref Range    Specimen Status [facility]    Lipase   Result Value Ref Range    Lipase 25.0 13.0 - 60.0 U/L   Ethanol   Result Value Ref Range    Ethanol Lvl 82 mg/dL   Blood Occult Stool Screen #1   Result Value Ref Range    Occult Blood Screening Result: POSITIVE  Normal range: Negative   (A)    Troponin   Result Value Ref Range    Troponin <0.01 <0.01 ng/mL   Protime-INR   Result Value Ref Range    Protime 12.8 11.7 - 14.5 sec    INR 0.98 0.87 - 1.14   Hemoglobin and Hematocrit   Result Value Ref Range    Hemoglobin 9.5 (L) 13.5 - 17.5 g/dL    Hematocrit 27.7 (L) 40.5 - 52.5 %   EKG 12 Lead (Select if Upper Abdominal Pain or symptoms of SOB,or Tachycardia)   Result Value Ref Range    Ventricular Rate 113 BPM    Atrial Rate 113 BPM    P-R Interval 134 ms    QRS Duration 92 ms    Q-T Interval 366 ms    QTc Calculation (Bazett) 502 ms    P Axis 49 degrees    R Axis 62 degrees    T Axis -73 degrees    Diagnosis       Sinus tachycardiaT wave abnormality, consider inferolateral ischemiaAbnormal ECGWhen compared with ECG of 21-AUG-2021 14:37,Vent. rate has increased BY  42 BPMInverted T waves have replaced nonspecific T wave abnormality in Anterolateral leads   TYPE AND SCREEN   Result Value Ref Range    ABO/Rh O POS     Antibody Screen NEG        I spoke with Dr. [facility] hospitalist. We discussed the history, physical exam, diagnostics, as well as their emergency department course. Based upon that discussion, we've decided to admit Marielle Boswell for further observation and evaluation of Michael Walters's   1. Rectal bleeding    2. Electrocardiogram showing T wave abnormalities    3. Alcohol abuse    4. Tachycardia    . DISPOSITION Admitted 07/25/2022 08:47:08 PM      PATIENT REFERRED TO:  No follow-up provider specified.     DISCHARGE MEDICATIONS:  New Prescriptions    No medications on file       DISCONTINUED MEDICATIONS:  Discontinued Medications    No medications on file              (Please note the MDM and HPI sections of this note were completed with a voice recognition program.  Efforts were made to edit the dictations but occasionally words are mis-transcribed.)    Electronically signed, Laurie Fraire, [address],           [address]  07/25/22 1878

## 2022-07-25 NOTE — ED PROVIDER NOTES
Emergency Department Attending Provider Note  Location: [address] [place] [address]  ED  7/25/2022     Patient Identification  Reza Hayden is a 32 y.o. male      Reza Hayden was evaluated in the Emergency Department for GI bleed. Patient reports that he drinks approximately 1 L of malt liquor daily. He states that 2 days ago he had coffee-ground emesis with noted red blood as well. Patient states that over the last day or 2 he has noted melena. Patient denies fevers, chills, or sweats. No cough, or congestion. No blood thinners. No sick contacts. Patient denies abdominal pain at this time. . Although initial history and physical exam information was obtained by DORA/NPP/MD/ (who also dictated a record of this visit), I personally saw the patient and performed a substantive portion of the visit including all aspects of the medical decision making. Physical exam: General: No acute distress. Head: Normocephalic/atraumatic. Heart: Tachycardic. Normal S1-S2. Lungs: Clear to auscultation bilaterally. No wheezes, rales, rhonchi. Abdomen: Soft. Nontender, nondistended. No rebound, guarding extremities: No swelling or edema. EKG Interpretation  Sinus tachycardia with a rate of 113. Normal axis. Normal intervals and durations. Inferior/lateral T wave inversion noted. This is new when compared to previous EKG on 8/21/2021. Patient seen and evaluated. Relevant records reviewed. Evidence of GI bleed. Protonix provided. Karin Mata DO am the primary clinician of record. I personally saw the patient and independently provided 0 minutes of non-concurrent critical care out of the total shared critical care time provided. This chart was generated in part by using Dragon Dictation system and may contain errors related to that system including errors in grammar, punctuation, and spelling, as well as words and phrases that may be inappropriate.  If there are any questions or concerns please feel free to contact the dictating provider for clarification.      Tasha Plaza, DO   Acute Care [facility]              Tasha Plaza DO  07/25/22 6149

## 2022-07-25 NOTE — CONSULTS
Called [facility] GI @7544 per CARLYLE Medina.  Re; symptomatic GIB. Dr. Steve Medina responded @3754.

## 2022-07-26 ENCOUNTER — ANESTHESIA EVENT (OUTPATIENT)
Dept: ENDOSCOPY | Age: 27
DRG: 243 | End: 2022-07-26
Payer: MEDICAID

## 2022-07-26 ENCOUNTER — ANESTHESIA (OUTPATIENT)
Dept: ENDOSCOPY | Age: 27
DRG: 243 | End: 2022-07-26
Payer: MEDICAID

## 2022-07-26 PROBLEM — F10.20 ALCOHOL DEPENDENCE (HCC): Status: ACTIVE | Noted: 2022-07-25

## 2022-07-26 PROBLEM — R74.8 ALKALINE PHOSPHATASE ELEVATION: Status: ACTIVE | Noted: 2022-07-26

## 2022-07-26 PROBLEM — R94.31 PROLONGED Q-T INTERVAL ON ECG: Status: ACTIVE | Noted: 2022-07-26

## 2022-07-26 LAB
ANION GAP SERPL CALCULATED.3IONS-SCNC: 12 MMOL/L (ref 3–16)
BUN BLDV-MCNC: 11 MG/DL (ref 7–20)
CALCIUM SERPL-MCNC: 8.7 MG/DL (ref 8.3–10.6)
CHLORIDE BLD-SCNC: 102 MMOL/L (ref 99–110)
CO2: 22 MMOL/L (ref 21–32)
CREAT SERPL-MCNC: 0.8 MG/DL (ref 0.9–1.3)
EKG ATRIAL RATE: 113 BPM
EKG DIAGNOSIS: NORMAL
EKG P AXIS: 49 DEGREES
EKG P-R INTERVAL: 134 MS
EKG Q-T INTERVAL: 366 MS
EKG QRS DURATION: 92 MS
EKG QTC CALCULATION (BAZETT): 502 MS
EKG R AXIS: 62 DEGREES
EKG T AXIS: -73 DEGREES
EKG VENTRICULAR RATE: 113 BPM
ESTIMATED AVERAGE GLUCOSE: 102.5 MG/DL
GAMMA GLUTAMYL TRANSFERASE: 132 U/L (ref 8–61)
GFR AFRICAN AMERICAN: >60
GFR NON-AFRICAN AMERICAN: >60
GLUCOSE BLD-MCNC: 110 MG/DL (ref 70–99)
HBA1C MFR BLD: 5.2 %
HCT VFR BLD CALC: 28 % (ref 40.5–52.5)
HCT VFR BLD CALC: 28.3 % (ref 40.5–52.5)
HCT VFR BLD CALC: 29.9 % (ref 40.5–52.5)
HCT VFR BLD CALC: 29.9 % (ref 40.5–52.5)
HEMOGLOBIN: 10 G/DL (ref 13.5–17.5)
HEMOGLOBIN: 10.2 G/DL (ref 13.5–17.5)
HEMOGLOBIN: 9.3 G/DL (ref 13.5–17.5)
HEMOGLOBIN: 9.6 G/DL (ref 13.5–17.5)
IRON SATURATION: 13 % (ref 20–50)
IRON: 51 UG/DL (ref 59–158)
POTASSIUM REFLEX MAGNESIUM: 4.1 MMOL/L (ref 3.5–5.1)
POTASSIUM, UR: 61.7 MMOL/L
SODIUM BLD-SCNC: 136 MMOL/L (ref 136–145)
TOTAL IRON BINDING CAPACITY: 393 UG/DL (ref 260–445)

## 2022-07-26 PROCEDURE — 84133 ASSAY OF URINE POTASSIUM: CPT

## 2022-07-26 PROCEDURE — 3609012400 HC EGD TRANSORAL BIOPSY SINGLE/MULTIPLE: Performed by: INTERNAL MEDICINE

## 2022-07-26 PROCEDURE — 2580000003 HC RX 258

## 2022-07-26 PROCEDURE — 2500000003 HC RX 250 WO HCPCS

## 2022-07-26 PROCEDURE — 83036 HEMOGLOBIN GLYCOSYLATED A1C: CPT

## 2022-07-26 PROCEDURE — 6370000000 HC RX 637 (ALT 250 FOR IP): Performed by: PHYSICIAN ASSISTANT

## 2022-07-26 PROCEDURE — 0DB58ZX EXCISION OF ESOPHAGUS, VIA NATURAL OR ARTIFICIAL OPENING ENDOSCOPIC, DIAGNOSTIC: ICD-10-PCS | Performed by: INTERNAL MEDICINE

## 2022-07-26 PROCEDURE — 88312 SPECIAL STAINS GROUP 1: CPT

## 2022-07-26 PROCEDURE — 88341 IMHCHEM/IMCYTCHM EA ADD ANTB: CPT

## 2022-07-26 PROCEDURE — 82977 ASSAY OF GGT: CPT

## 2022-07-26 PROCEDURE — 3700000000 HC ANESTHESIA ATTENDED CARE: Performed by: INTERNAL MEDICINE

## 2022-07-26 PROCEDURE — C9113 INJ PANTOPRAZOLE SODIUM, VIA: HCPCS

## 2022-07-26 PROCEDURE — 1200000000 HC SEMI PRIVATE

## 2022-07-26 PROCEDURE — 7100000010 HC PHASE II RECOVERY - FIRST 15 MIN: Performed by: INTERNAL MEDICINE

## 2022-07-26 PROCEDURE — 85018 HEMOGLOBIN: CPT

## 2022-07-26 PROCEDURE — 7100000011 HC PHASE II RECOVERY - ADDTL 15 MIN: Performed by: INTERNAL MEDICINE

## 2022-07-26 PROCEDURE — 93010 ELECTROCARDIOGRAM REPORT: CPT | Performed by: INTERNAL MEDICINE

## 2022-07-26 PROCEDURE — 6360000002 HC RX W HCPCS

## 2022-07-26 PROCEDURE — 6370000000 HC RX 637 (ALT 250 FOR IP)

## 2022-07-26 PROCEDURE — 88342 IMHCHEM/IMCYTCHM 1ST ANTB: CPT

## 2022-07-26 PROCEDURE — 94761 N-INVAS EAR/PLS OXIMETRY MLT: CPT

## 2022-07-26 PROCEDURE — 83550 IRON BINDING TEST: CPT

## 2022-07-26 PROCEDURE — 83540 ASSAY OF IRON: CPT

## 2022-07-26 PROCEDURE — 2500000003 HC RX 250 WO HCPCS: Performed by: NURSE PRACTITIONER

## 2022-07-26 PROCEDURE — 6370000000 HC RX 637 (ALT 250 FOR IP): Performed by: NURSE PRACTITIONER

## 2022-07-26 PROCEDURE — 2709999900 HC NON-CHARGEABLE SUPPLY: Performed by: INTERNAL MEDICINE

## 2022-07-26 PROCEDURE — 6370000000 HC RX 637 (ALT 250 FOR IP): Performed by: INTERNAL MEDICINE

## 2022-07-26 PROCEDURE — 2500000003 HC RX 250 WO HCPCS: Performed by: INTERNAL MEDICINE

## 2022-07-26 PROCEDURE — 2580000003 HC RX 258: Performed by: PHYSICIAN ASSISTANT

## 2022-07-26 PROCEDURE — 85014 HEMATOCRIT: CPT

## 2022-07-26 PROCEDURE — 36415 COLL VENOUS BLD VENIPUNCTURE: CPT

## 2022-07-26 PROCEDURE — 2700000000 HC OXYGEN THERAPY PER DAY

## 2022-07-26 PROCEDURE — 80048 BASIC METABOLIC PNL TOTAL CA: CPT

## 2022-07-26 PROCEDURE — 88305 TISSUE EXAM BY PATHOLOGIST: CPT

## 2022-07-26 RX ORDER — ONDANSETRON 2 MG/ML
4 INJECTION INTRAMUSCULAR; INTRAVENOUS
Status: DISCONTINUED | OUTPATIENT
Start: 2022-07-26 | End: 2022-07-26 | Stop reason: HOSPADM

## 2022-07-26 RX ORDER — MEPERIDINE HYDROCHLORIDE 50 MG/ML
12.5 INJECTION INTRAMUSCULAR; INTRAVENOUS; SUBCUTANEOUS EVERY 5 MIN PRN
Status: DISCONTINUED | OUTPATIENT
Start: 2022-07-26 | End: 2022-07-26 | Stop reason: HOSPADM

## 2022-07-26 RX ORDER — OXYCODONE HYDROCHLORIDE 5 MG/1
5 TABLET ORAL PRN
Status: DISCONTINUED | OUTPATIENT
Start: 2022-07-26 | End: 2022-07-26 | Stop reason: HOSPADM

## 2022-07-26 RX ORDER — LIDOCAINE HYDROCHLORIDE 10 MG/ML
INJECTION, SOLUTION EPIDURAL; INFILTRATION; INTRACAUDAL; PERINEURAL PRN
Status: DISCONTINUED | OUTPATIENT
Start: 2022-07-26 | End: 2022-07-26 | Stop reason: SDUPTHER

## 2022-07-26 RX ORDER — CHLORDIAZEPOXIDE HYDROCHLORIDE 25 MG/1
50 CAPSULE, GELATIN COATED ORAL ONCE
Status: COMPLETED | OUTPATIENT
Start: 2022-07-26 | End: 2022-07-26

## 2022-07-26 RX ORDER — LABETALOL HYDROCHLORIDE 5 MG/ML
10 INJECTION, SOLUTION INTRAVENOUS EVERY 4 HOURS PRN
Status: DISCONTINUED | OUTPATIENT
Start: 2022-07-26 | End: 2022-07-27 | Stop reason: HOSPADM

## 2022-07-26 RX ORDER — SODIUM CHLORIDE 9 MG/ML
INJECTION, SOLUTION INTRAVENOUS PRN
Status: DISCONTINUED | OUTPATIENT
Start: 2022-07-26 | End: 2022-07-26 | Stop reason: HOSPADM

## 2022-07-26 RX ORDER — SODIUM CHLORIDE 0.9 % (FLUSH) 0.9 %
5-40 SYRINGE (ML) INJECTION PRN
Status: DISCONTINUED | OUTPATIENT
Start: 2022-07-26 | End: 2022-07-26 | Stop reason: HOSPADM

## 2022-07-26 RX ORDER — SODIUM CHLORIDE 0.9 % (FLUSH) 0.9 %
5-40 SYRINGE (ML) INJECTION EVERY 12 HOURS SCHEDULED
Status: DISCONTINUED | OUTPATIENT
Start: 2022-07-26 | End: 2022-07-26 | Stop reason: HOSPADM

## 2022-07-26 RX ORDER — DIPHENHYDRAMINE HYDROCHLORIDE 50 MG/ML
12.5 INJECTION INTRAMUSCULAR; INTRAVENOUS
Status: DISCONTINUED | OUTPATIENT
Start: 2022-07-26 | End: 2022-07-26 | Stop reason: HOSPADM

## 2022-07-26 RX ORDER — LABETALOL HYDROCHLORIDE 5 MG/ML
10 INJECTION, SOLUTION INTRAVENOUS EVERY 4 HOURS PRN
Status: DISCONTINUED | OUTPATIENT
Start: 2022-07-26 | End: 2022-07-26

## 2022-07-26 RX ORDER — OXYCODONE HYDROCHLORIDE 5 MG/1
10 TABLET ORAL PRN
Status: DISCONTINUED | OUTPATIENT
Start: 2022-07-26 | End: 2022-07-26 | Stop reason: HOSPADM

## 2022-07-26 RX ORDER — LABETALOL HYDROCHLORIDE 5 MG/ML
5 INJECTION, SOLUTION INTRAVENOUS EVERY 10 MIN PRN
Status: DISCONTINUED | OUTPATIENT
Start: 2022-07-26 | End: 2022-07-26 | Stop reason: HOSPADM

## 2022-07-26 RX ORDER — PROPOFOL 10 MG/ML
INJECTION, EMULSION INTRAVENOUS PRN
Status: DISCONTINUED | OUTPATIENT
Start: 2022-07-26 | End: 2022-07-26 | Stop reason: SDUPTHER

## 2022-07-26 RX ORDER — POLYETHYLENE GLYCOL 3350 17 G
2 POWDER IN PACKET (EA) ORAL
Status: DISCONTINUED | OUTPATIENT
Start: 2022-07-26 | End: 2022-07-27 | Stop reason: HOSPADM

## 2022-07-26 RX ORDER — SODIUM CHLORIDE, SODIUM LACTATE, POTASSIUM CHLORIDE, CALCIUM CHLORIDE 600; 310; 30; 20 MG/100ML; MG/100ML; MG/100ML; MG/100ML
INJECTION, SOLUTION INTRAVENOUS CONTINUOUS PRN
Status: DISCONTINUED | OUTPATIENT
Start: 2022-07-26 | End: 2022-07-26 | Stop reason: SDUPTHER

## 2022-07-26 RX ORDER — METOPROLOL TARTRATE 5 MG/5ML
5 INJECTION INTRAVENOUS ONCE
Status: COMPLETED | OUTPATIENT
Start: 2022-07-26 | End: 2022-07-26

## 2022-07-26 RX ADMIN — CHLORDIAZEPOXIDE HYDROCHLORIDE 10 MG: 5 CAPSULE ORAL at 19:46

## 2022-07-26 RX ADMIN — NICOTINE POLACRILEX 2 MG: 2 LOZENGE ORAL at 17:26

## 2022-07-26 RX ADMIN — CHLORDIAZEPOXIDE HYDROCHLORIDE 50 MG: 25 CAPSULE ORAL at 05:28

## 2022-07-26 RX ADMIN — PROPOFOL 70 MG: 10 INJECTION, EMULSION INTRAVENOUS at 14:13

## 2022-07-26 RX ADMIN — Medication 40 MG: at 10:15

## 2022-07-26 RX ADMIN — Medication 10 ML: at 10:24

## 2022-07-26 RX ADMIN — METOPROLOL TARTRATE 5 MG: 5 INJECTION INTRAVENOUS at 04:49

## 2022-07-26 RX ADMIN — SODIUM CHLORIDE, PRESERVATIVE FREE 10 ML: 5 INJECTION INTRAVENOUS at 19:45

## 2022-07-26 RX ADMIN — PROPOFOL 50 MG: 10 INJECTION, EMULSION INTRAVENOUS at 14:16

## 2022-07-26 RX ADMIN — LORAZEPAM 3 MG: 1 TABLET ORAL at 19:45

## 2022-07-26 RX ADMIN — LABETALOL HYDROCHLORIDE 10 MG: 5 INJECTION INTRAVENOUS at 23:03

## 2022-07-26 RX ADMIN — Medication 100 MG: at 10:16

## 2022-07-26 RX ADMIN — SODIUM CHLORIDE: 9 INJECTION, SOLUTION INTRAVENOUS at 04:29

## 2022-07-26 RX ADMIN — Medication 40 MG: at 19:45

## 2022-07-26 RX ADMIN — PROPOFOL 30 MG: 10 INJECTION, EMULSION INTRAVENOUS at 14:10

## 2022-07-26 RX ADMIN — LIDOCAINE HYDROCHLORIDE 100 MG: 10 INJECTION, SOLUTION EPIDURAL; INFILTRATION; INTRACAUDAL; PERINEURAL at 14:08

## 2022-07-26 RX ADMIN — Medication 10 ML: at 23:04

## 2022-07-26 RX ADMIN — LORAZEPAM 2 MG: 1 TABLET ORAL at 04:38

## 2022-07-26 RX ADMIN — LABETALOL HYDROCHLORIDE 10 MG: 5 INJECTION INTRAVENOUS at 09:25

## 2022-07-26 RX ADMIN — CHLORDIAZEPOXIDE HYDROCHLORIDE 10 MG: 5 CAPSULE ORAL at 12:52

## 2022-07-26 RX ADMIN — LORAZEPAM 1 MG: 1 TABLET ORAL at 12:52

## 2022-07-26 RX ADMIN — POTASSIUM CHLORIDE 10 MEQ: 7.46 INJECTION, SOLUTION INTRAVENOUS at 00:38

## 2022-07-26 RX ADMIN — LABETALOL HYDROCHLORIDE 10 MG: 5 INJECTION INTRAVENOUS at 05:28

## 2022-07-26 RX ADMIN — LABETALOL HYDROCHLORIDE 10 MG: 5 INJECTION INTRAVENOUS at 16:17

## 2022-07-26 RX ADMIN — SODIUM CHLORIDE, SODIUM LACTATE, POTASSIUM CHLORIDE, AND CALCIUM CHLORIDE: .6; .31; .03; .02 INJECTION, SOLUTION INTRAVENOUS at 14:08

## 2022-07-26 RX ADMIN — POTASSIUM CHLORIDE 10 MEQ: 7.46 INJECTION, SOLUTION INTRAVENOUS at 01:45

## 2022-07-26 ASSESSMENT — PAIN SCALES - GENERAL
PAINLEVEL_OUTOF10: 4
PAINLEVEL_OUTOF10: 0

## 2022-07-26 NOTE — CARE COORDINATION
Chart review day 1. IPTA. Given alcohol cessation resources. States he is willing to go to inpatient treatment. Referral made to [facility], information faxed. Following for GI rec's. Riri Smith RN     Call placed to follow up with [facility] regarding referral. Left VM for Rito Huertas. Notified that pt will likely be inpatient for several more days, pt getting PO Ativan and Librium. Awaiting return call. GI notes reviewed. \"Monitor H/H and overt signs of GI bleeding. CT scan did not show any evidence of cirrhosis. Etiology therefore most likely related to upper GI mucosal disease. No need for octreotide drip, Keep NPO, PPI infusion, Plan for EGD today. \" Livia Rene RN     Spoke with Flo from [facility]. Reports received referral and faxed information. Will need further information prior to acceptance.  Riri Smith RN

## 2022-07-26 NOTE — PROGRESS NOTES
Writer Hilary Cox NP for elevated BP. IV metoprolol given. BP has actually worsened.  PM resident notified of this and SpO2 60% range when sleep respite 6 L NC.

## 2022-07-26 NOTE — PROGRESS NOTES
Hospitalist notified of SpO2 71% while asleep at times. Pt quickly awakens to low SpO2 alarm and comes back up to 99%. Placed on 2 L NC but still drops to 82%. Awaiting call back. Call light within reach, will continue to monitor.

## 2022-07-26 NOTE — OP NOTE
Endoscopy Note    Patient: Herve Singh  : 1995  CSN: [id number]    Procedure: Esophagogastroduodenoscopy with biopsy    Date:  2022     Surgeon:  Zoey Owens MD     Referring Physician:  No primary care provider on file. Preoperative Diagnosis:  Melena [K92.1]    Postoperative Diagnosis:  * No post-op diagnosis entered *    Anesthesia:  MAC    EBL: minimal to none. Indications: This is a 32y.o. year old male who presents today with Hematemesis. Description of Procedure:  Informed consent was obtained from the patient after explanation of indications, benefits and possible risks and complications of the procedure. The patient was then taken to the endoscopy suite, placed in the left lateral decubitus position and the above IV sedation was administrered. The Olympus video endoscope was passed through the hypopharynx into the esophagus. The scope was advanced all the way to the third portion of the duodenum. The scope was slowly withdrawn and mucosa was carefully evaluated including a retroflex view of the proximal stomach. Findings and interventions are described below. The patient was decompressed and the scope was then withdrawn. Gastric or Duodenal ulcer present: No    The patient tolerated the procedure well and was taken to the post anesthesia care unit in good condition. There were no immediate complications. Impression:  -LA grade C mid to distal esophagitis without active bleeding status post biopsy      Recommendations: -Await pathology. Will start clear liquid diet today and switch to twice daily PPI. Zoey Owens MD, MD  [address] [facility]  [phone]    Please note that some or all of this record was generated using voice recognition software. If there are any questions about the content of this document, please contact the author as some errors in translation may have occurred.

## 2022-07-26 NOTE — PROGRESS NOTES
07/26/22 0545   NIV Type   Skin Protection for O2 Device N/A   Equipment Type v60   Mode Bilevel   Mask Type Full face mask   Mask Size Large   Settings/Measurements   PIP Observed 10 cm H20  (ramp applied for comfort)   IPAP 16 cmH20   CPAP/EPAP 8 cmH2O   Rate Ordered 15   Resp 24   FiO2  30 %   I Time/ I Time % 0.9 s   Vt Exhaled 478 mL   Minute Volume 11.3 Liters   Mask Leak (lpm)   (pt has beard)   Comfort Level Good   Using Accessory Muscles No   SpO2 100   Patient's Home Machine No   Alarm Settings   Alarms On Y   Low Pressure 6 cmH2O   High Pressure  30 cmH2O   Delay Alarm 20 sec(s)   RR Low  6   RR High 40 br/min
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
## 2022-07-26 NOTE — PROGRESS NOTES
07/26/22 0846   NIV Type   Equipment Type v60   Mode Bilevel   Mask Type Full face mask   Mask Size Large   Settings/Measurements   IPAP 16 cmH20   CPAP/EPAP 8 cmH2O   Rate Ordered 15   Resp 20   FiO2  30 %   I Time/ I Time % 0.9 s   Vt Exhaled 696 mL   Minute Volume 10.9 Liters   Comfort Level Good   Using Accessory Muscles No   SpO2 100   Patient's Home Machine No

## 2022-07-26 NOTE — PROGRESS NOTES
Hospitalist Progress Note    Date of Admission: 7/25/2022    Chief Complaint: GI bleed    Hospital Course:    32 y.o. male with a history of alcohol use who presented to [facility] with 4 days of black tarry stools. Patient also reports 2 nights ago he had emesis of the dark red/black color. He made himself vomit again yesterday to see if it was still the same color and it was his normal food. Patient describes that he has had some abdominal discomfort that occurred today, but nothing that he would describe as pain. Shola Nunes He thinks he has a generally high pain tolerance. Additionally today patient did have some lightheadedness. He continues to have some nausea but no emesis. Throughout the past few days he has had normal appetite but does say he has not eaten much today. Denies any headache, fever, chills, chest pain, or shortness of breath. Patient reports that he has generalized body aches and when he goes to work as a . Patient reports taking a large amount of ibuprofen, change milligram tablets 6 tablets once a day 3 to 4 days a week for the past 3 months. Patient does consume large amounts of alcohol 1 L of malt liquor/hard liquor for the last year almost every day. He does state about a week ago he did not drink for 3 days and did endorse increased anxiety and trouble sleeping but denies any murmurs. His alcohol intake prior has been more than 2 drinks per night for the last 2 to 3 years. He states that he knows this is a problem and one of the reasons he presented to the ED, he would like to get help quitting. He has scheduled with a new PCP but cannot be seen until November. He also smokes weed. Subjective: EGD completed. No BM or bleeding today. Hb stable. BP remains elevated.      Labs:   Recent Labs     07/25/22  1528 07/25/22  2058 07/26/22  0421 07/26/22  0845   WBC 9.1  --   --   --    HGB 11.0* 9.5* 9.6* 10.0*   HCT 32.3* 27.7* 28.3* 29.9*     --   -- --      Recent Labs     07/25/22  1528 07/26/22  0845    136   K 3.0* 4.1   CL 99 102   CO2 23 22   BUN 12 11   CREATININE 0.8* 0.8*   CALCIUM 8.8 8.7     Recent Labs     07/25/22  1528   AST 50*   ALT 35   BILITOT <0.2   ALKPHOS 167*     Recent Labs     07/25/22  1528   INR 0.98       Physical Exam Performed:    BP (!) 179/116   Pulse 92   Temp 97.8 °F (36.6 °C) (Oral)   Resp 20   Ht 6' (1.829 m)   Wt (!) 351 lb 6.4 oz (159.4 kg)   SpO2 100%   BMI 47.66 kg/m²     General appearance:  No apparent distress, appears stated age and cooperative. HEENT:  Pupils equal, round, and reactive to light. Conjunctivae/corneas clear. Neck:  Supple, no jugular venous distention. Trachea midline with full range of motion. Respiratory:  Normal respiratory effort. Clear to auscultation, bilaterally without Rales/Wheezes/Rhonchi. Cardiovascular:  Regular rate and rhythm with normal S1/S2 without murmurs, rubs or gallops. Abdomen:  Soft, non-tender, non-distended with normal bowel sounds. Musculoskelatal:  No clubbing, cyanosis or edema bilaterally. Full range of motion without deformity. Neurologic:  Neurovascularly intact without any focal sensory/motor deficits. Cranial nerves: II-XII intact, grossly non-focal.  Psychiatric:  Alert and oriented, thought content appropriate, normal insight  Skin:  Skin color, texture, turgor normal.  No rashes or lesions.   Capillary Refill:  Brisk,< 3 seconds   Peripheral Pulses:  +2 palpable, equal bilaterally       Assessment/Plan:    Active Hospital Problems    Diagnosis     Alkaline phosphatase elevation [R74.8]      Priority: Medium    Prolonged Q-T interval on ECG [R94.31]      Priority: Medium    Upper GI bleed [K92.2]      Priority: Medium    Alcohol dependence ([facility] [address] [F10.20]      Priority: Medium    Hypokalemia [E87.6]      Priority: Medium    Excessive use of nonsteroidal anti-inflammatory drug (NSAID) [F19.90]      Priority: Medium    Acute blood loss anemia [D62] Priority: Medium     GI Bleed likely 2/2 Esophagitis  - Could be related to GERD, EtOH use and heavy NSAID use recently. - No further bleeding  - EGD completed, no active bleeding  - Continue BID PPI  - Advance to clears    Acute blood loss anemia  - Initial 11.0, baseline ~15  - Likely 2/2 to upper GI bleed as above  - H/H q 6 hours, will transfuse Hb < 7.0    Hypokalemia  Prolonged QT interval  - Initial: 3.0 and new T-wave inversions and prolonged QT interval on EKG  - Unclear etiology, patient is not taking prescribed diuretics  - Avoid QT prolonging agents  - Monitor. Elevated Alkaline Phosphatase  - Unclear etiology as liver enzymes are near wnl  - GGT    Alcohol Dependence  - CIWA protocol in place  - Patient requiring increasing doses of ativan since arrival. Will add scheduled chlordiazepoxide 10 mg TID, hold parameters for CIWA < 10  - Thiamine daily  - Patient is aware of the risks of continued heavy drinking and does want to stop. Will consult social work for resources. Also advised patient to make new pt appointment with another office sooner    HTN  - Possibly 2/2 undiagnosed DELPHINE  - Non-complaint with regimen at home  - Resume and adjust meds as needed.      Obesity, Hyperglycemia  - A1c  - Will need to  pt on risks of obesity and encourage weight loss      DVT Prophylaxis: SCDs  Diet: Diet NPO Exceptions are: Ice Chips  Code Status: Full Code  PT/OT Eval Status: NA    Dispo - 1-2 days    Samia Conti MD

## 2022-07-26 NOTE — ANESTHESIA POSTPROCEDURE EVALUATION
Department of Anesthesiology  Postprocedure Note    Patient: Kiana Petersen  MRN: [id number]  [place]: 1995  Date of evaluation: 7/26/2022      Procedure Summary     Date: 07/26/22 Room / Location: [address] / [facility]    Anesthesia Start: 6282 Anesthesia Stop: 7919    Procedure: EGD BIOPSY Diagnosis:       Melena      (Melena [K92.1])    Surgeons: Janina Boucher MD Responsible Provider: Miguel Mariee MD    Anesthesia Type: MAC ASA Status: 3 - Emergent          Anesthesia Type: No value filed.     Daja Phase I: Daja Score: 10    Daja Phase II: Daja Score: 10      Anesthesia Post Evaluation    Comments: Postoperative Anesthesia Note    Name:    Kiana Petersen  MRN:      [id number]    Patient Vitals in the past 12 hrs:  07/26/22 1457, BP:(!) 181/86, Pulse:94, Resp:20, SpO2:97 %  07/26/22 1454, BP:(!) 179/90, Pulse:97, Resp:20, SpO2:98 %  07/26/22 1443, BP:(!) 181/104, Pulse:97, Resp:20, SpO2:97 %  07/26/22 1430, BP:(!) 164/100, Pulse:96, Resp:20, SpO2:98 %  07/26/22 1429, BP:(!) 161/112, Pulse:96, Resp:20, SpO2:98 %  07/26/22 1304, Temp:97.5 °F (36.4 °C)  07/26/22 1245, BP:(!) 166/106, Pulse:90  07/26/22 1056, BP:(!) 166/110, Pulse:96  07/26/22 1055, Pulse:96  07/26/22 1045, BP:(!) 166/110, Temp:97.6 °F (36.4 °C), Temp src:Oral, Pulse:96, Resp:20, SpO2:98 %  07/26/22 1015, BP:(!) 194/134  07/26/22 0931, BP:(!) 166/103  07/26/22 0915, BP:(!) 184/104, SpO2:99 %  07/26/22 0850, BP:(!) 179/116, Pulse:92  07/26/22 0848, BP:(!) 179/116, Resp:20, SpO2:100 %  07/26/22 0846, Resp:20  07/26/22 0845, BP:(!) 180/121  07/26/22 0815, BP:(!) 182/109, Pulse:92, Resp:12, SpO2:100 %  07/26/22 0641, BP:(!) 157/97, Pulse:97  07/26/22 0600, BP:(!) 198/101, Pulse:90, SpO2:93 %  07/26/22 0555, BP:(!) 182/97  07/26/22 0550, BP:(!) 178/109, Pulse:91, SpO2:99 %  07/26/22 0545, BP:(!) 184/106, Pulse:89, Resp:24, SpO2:100 %  07/26/22 0520, BP:(!) 208/118, Pulse:90, SpO2:92 %  07/26/22 0515, BP:(!) 218/125, Pulse:92, SpO2:96 %  07/26/22 0510, BP:(!) 225/120, Pulse:92, SpO2:(!) 86 %  07/26/22 0505, BP:(!) 219/150, Pulse:94, SpO2:93 %  07/26/22 0500, BP:(!) 196/145, Pulse:91, SpO2:(!) 72 %  07/26/22 0455, BP:(!) 197/107, Pulse:90, SpO2:100 %  07/26/22 0433, BP:(!) 181/129, Temp:97.8 °F (36.6 °C), Temp src:Oral, Pulse:(!) 105, Resp:16, SpO2:99 %     LABS:    CBC  Lab Results       Component                Value               Date/Time                  WBC                      9.1                 07/25/2022 03:28 PM        HGB                      10.0 (L)            07/26/2022 08:45 AM        HCT                      29.9 (L)            07/26/2022 08:45 AM        PLT                      306                 07/25/2022 03:28 PM   RENAL  Lab Results       Component                Value               Date/Time                  NA                       136                 07/26/2022 08:45 AM        K                        4.1                 07/26/2022 08:45 AM        CL                       102                 07/26/2022 08:45 AM        CO2                      22                  07/26/2022 08:45 AM        BUN                      11                  07/26/2022 08:45 AM        CREATININE               0.8 (L)             07/26/2022 08:45 AM        GLUCOSE                  110 (H)             07/26/2022 08:45 AM   COAGS  Lab Results       Component                Value               Date/Time                  PROTIME                  12.8                07/25/2022 03:28 PM        INR                      0.98                07/25/2022 03:28 PM     Intake & Output:  @00PDVO@    Nausea & Vomiting:  No    Level of Consciousness:  Awake    Pain Assessment:  Adequate analgesia    Anesthesia Complications:  No apparent anesthetic complications    SUMMARY      Vital signs stable  OK to discharge from Stage I post anesthesia care.   Care transferred from Anesthesiology department on discharge from perioperative area

## 2022-07-26 NOTE — CARE COORDINATION
CASE MANAGEMENT INITIAL ASSESSMENT      Reviewed chart and completed assessment with patient:bedside  Family present: none  Explained Case Management role/services. Primary contact information:Tasia/parent    Health Care Decision Maker :   Primary Decision Maker: Neal De La Cruz - Parent - [phone]    Secondary Decision Maker: Alan Childers - Parent - [phone]          Can this person be reached and be able to respond quickly, such as within a few minutes or hours? Yes      Admit date/status:7/25/22  Diagnosis:Alcohol abuse/GIB   Is this a Readmission?:  No      Insurance: Medicaid   Precert required for SNF: No       3 night stay required: No    Living arrangements, Adls, care needs, prior to admission: pt lives in a 1 story house with 2 roommates    Durable Medical Equipment at home:  Walker__Cane__RTS__ BSC__Shower Chair__  02__ HHN__ CPAP__  BiPap__  Hospital Bed__ W/C___ Other_____    Services in the home and/or outpatient, prior to admission:none    Current PCP: China Jorge                                Medications: Prescription coverage? Yes   Will pt require financial assistance with medications No     Transportation needs: none         PT/OT recs:none    Hospital Exemption Notification (HEN):needed for snf    Barriers to discharge: alcohol abuse/withdrawal    Plan/comments: IPTA. Given alcohol cessation resources. States he is willing to go to inpatient treatment. Referral made to [facility],  faxed.  Following for GI rec's Riri Castellon, RN    Order noted for For consideration of rehab  [facility] on chart for MD signature

## 2022-07-26 NOTE — CONSULTS
Consultation Note    Patient Name: Amy Moon  : 1995  Age: 32 y.o. Admitting Physician: Samir Bean MD   Date of Admission: 2022  3:11 PM   Primary Care Physician: No primary care provider on file. Amy Moon is being seen at the request of Samir Bean MD for symptomatic GI bleed. History of Present Illness:  32year old M with PMH significant for anxiety, depression, and substance/EtOH abuse. No prior abdominal surgeries noted. Patients presented to the ER last night with complaints of melena and hematemesis. He started with melena 4 days ago and had an episode of hematemesis 2 days ago. Also with some reports of abdominal discomfort. Hgb 10 this morning, down from baseline of 14-15. No prior endoscopies noted. Reportedly drinks 750 - 1000 ml of liquor daily. Patient has also regularly been using ibuprofen for \"body pains\". He has had no further episodes of melena or hematemesis since last night. GI History:  None noted     Past Medical History:  Past Medical History:   Diagnosis Date    Anxiety     Depression     Headache(784.0)     Substance abuse ([facility] [address]         Past Surgical History:  Past Surgical History:   Procedure Laterality Date    FRACTURE SURGERY          Historical Medications:  Prior to Visit Medications    Medication Sig Taking?  Authorizing Provider   lisinopril (PRINIVIL;ZESTRIL) 20 MG tablet Take 1 tablet by mouth daily  Patient not taking: Reported on 2022  Adrianna Garcia MD   amLODIPine (NORVASC) 10 MG tablet Take 1 tablet by mouth daily  Patient not taking: Reported on 2022  Adrianna Garcia MD   hydroCHLOROthiazide (HYDRODIURIL) 25 MG tablet Take 1 tablet by mouth daily  Patient not taking: Reported on 2022  Adrianna Garcia MD        Hospital Medications:  Current Facility-Administered Medications: labetalol (NORMODYNE;TRANDATE) injection 10 mg, 10 mg, IntraVENous, Q4H PRN  sodium chloride flush 0.9 % injection 5-40 mL, 5-40 mL, IntraVENous, 2 times per day  sodium chloride flush 0.9 % injection 5-40 mL, 5-40 mL, IntraVENous, PRN  0.9 % sodium chloride infusion, , IntraVENous, PRN  thiamine tablet 100 mg, 100 mg, Oral, Daily  LORazepam (ATIVAN) tablet 1 mg, 1 mg, Oral, Q1H PRN **OR** LORazepam (ATIVAN) injection 1 mg, 1 mg, IntraVENous, Q1H PRN **OR** LORazepam (ATIVAN) tablet 2 mg, 2 mg, Oral, Q1H PRN **OR** LORazepam (ATIVAN) injection 2 mg, 2 mg, IntraVENous, Q1H PRN **OR** LORazepam (ATIVAN) tablet 3 mg, 3 mg, Oral, Q1H PRN **OR** LORazepam (ATIVAN) injection 3 mg, 3 mg, IntraVENous, Q1H PRN **OR** LORazepam (ATIVAN) tablet 4 mg, 4 mg, Oral, Q1H PRN **OR** LORazepam (ATIVAN) injection 4 mg, 4 mg, IntraVENous, Q1H PRN  sodium chloride flush 0.9 % injection 5-40 mL, 5-40 mL, IntraVENous, 2 times per day  sodium chloride flush 0.9 % injection 5-40 mL, 5-40 mL, IntraVENous, PRN  0.9 % sodium chloride infusion, , IntraVENous, PRN  acetaminophen (TYLENOL) tablet 650 mg, 650 mg, Oral, Q6H PRN **OR** acetaminophen (TYLENOL) suppository 650 mg, 650 mg, Rectal, Q6H PRN  promethazine (PHENERGAN) tablet 12.5 mg, 12.5 mg, Oral, Q6H PRN  pantoprazole (PROTONIX) 40 mg in sodium chloride (PF) 10 mL injection, 40 mg, IntraVENous, Q12H  nicotine (NICODERM CQ) 21 MG/24HR 1 patch, 1 patch, TransDERmal, Daily  chlordiazePOXIDE (LIBRIUM) capsule 10 mg, 10 mg, Oral, TID     Social History:   Social History       Tobacco History       Smoking Status  Never      Smokeless Tobacco Use  Former Quit Date  2/26/2015 Smokeless Tobacco Type  Chew              Alcohol History       Alcohol Use Status  Yes Drinks/Week  0 Standard drinks or equivalent per week Comment  750 ml hard liquor daily              Drug Use       Drug Use Status  Yes Types  Marijuana (Weed) Comment  1-2 x a month              Sexual Activity       Sexually Active  Not Asked                     Family History:  Family History   Problem Relation Age of Onset    Other Mother         amyloidosis High Blood Pressure Father         Allergies: Allergies   Allergen Reactions    Buspar [Buspirone]      Self harm thoughts    Paroxetine      Self harm thoughts        ROS:   General: No fever or weight change  Hematologic: No unexpected submucosal bleeding or bruising  HEENT: No sore throat or facial pain  Respiratory: No cough or dyspnea  Cardiovascular: No angina or dependent edema  Gastrointestinal: See HPI  Musculoskeletal: No usual joint pain or stiffness  Skin: No skin eruptions or changing lesions  Neurologic: No focal weakness or numbness  Psychiatric: No anxiety or sleep disturbance    Physical Exam:  Vital Signs:   Vitals:    07/26/22 0931   BP: (!) 166/103   Pulse:    Resp:    Temp:    SpO2:        General: Well-nourished, well-developed  HEENT: Sclera anicteric, mucosal membranes moist  Cardiovascular: Regular rate and rhythm. No murmurs. Respiratory: Respirations nonlabored, no crepitus  GI: Abdomen nondistended, soft, and nontender. Normal active bowel sounds. No masses palpable. Rectal: Deferred  Musculoskeletal: No pitting edema of the lower legs. Neurological: Gross memory appears intact. Patient is alert and oriented    Recent Imaging:     CT ABDOMEN PELVIS W IV CONTRAST Additional Contrast? None  Narrative: EXAMINATION:  CT OF THE ABDOMEN AND PELVIS WITH CONTRAST 7/25/2022 4:01 pm    TECHNIQUE:  CT of the abdomen and pelvis was performed with the administration of  intravenous contrast. Multiplanar reformatted images are provided for review. Automated exposure control, iterative reconstruction, and/or weight based  adjustment of the mA/kV was utilized to reduce the radiation dose to as low  as reasonably achievable.     COMPARISON:  CT abdomen and pelvis dated 08/20/2021    HISTORY:  ORDERING SYSTEM PROVIDED HISTORY: GIB protocol  TECHNOLOGIST PROVIDED HISTORY:  Additional Contrast?->None  Reason for exam:->GIB protocol  Decision Support Exception - unselect if not a suspected or confirmed  emergency medical condition->Emergency Medical Condition (MA)  Reason for Exam: Rectal bleeding, x4 days, vomiting    FINDINGS:  Lower Chest:  Visualized portion of the lower chest demonstrates no acute  abnormality. Organs: Hepatic steatosis without evidence of intrahepatic biliary ductal  dilation. The gallbladder is unremarkable. The spleen, pancreas and adrenal  glands are unremarkable. The kidneys enhance symmetrically without evidence  of hydronephrosis. GI/Bowel: Limited study to evaluate for evidence of active bleeding secondary  to inappropriately delayed phases. No gross evidence of active extravasation  of contrast.  Stomach and duodenal sweep demonstrate no acute abnormality. There is no evidence of bowel obstruction. No evidence of abnormal bowel  wall thickening or distension. The appendix is visualized and is  unremarkable. No evidence of acute appendicitis. Pelvis:  Bladder is unremarkable in appearance. No acute abnormality of the  prostate. Peritoneum/Retroperitoneum: No evidence of ascites or free air. No evidence  of lymphadenopathy. Aorta is normal in caliber. Bones/Soft Tissues:  No acute abnormality of the visualized osseous  structures. Visualized soft tissues are unremarkable. Impression: Limited study to evaluate for evidence of active bleeding secondary to  inappropriately delayed phases. No gross evidence of active extravasation of  contrast.    Otherwise no acute pathology in the abdomen and pelvis. XR CHEST PORTABLE  Narrative: EXAMINATION:  ONE XRAY VIEW OF THE CHEST    7/25/2022 3:56 pm    COMPARISON:  Chest x-ray dated 08/20/2021. HISTORY:  ORDERING SYSTEM PROVIDED HISTORY: sob  TECHNOLOGIST PROVIDED HISTORY:  Reason for exam:->sob  Reason for Exam: sob    FINDINGS:  HEART/MEDIASTINUM: The cardiomediastinal silhouette is within normal limits. PLEURA/LUNGS: There are no focal consolidations or pleural effusions.  There  is no appreciable pneumothorax. BONES/SOFT TISSUE: No acute abnormality. Impression: No radiographic evidence of acute pulmonary disease. Labs:      Recent Labs     07/25/22  1528 07/25/22  2058 07/26/22  0421 07/26/22  0845   HGB 11.0* 9.5* 9.6* 10.0*   WBC 9.1  --   --   --    PROT 7.0  --   --   --    LABALBU 4.3  --   --   --    ALKPHOS 167*  --   --   --    ALT 35  --   --   --    AST 50*  --   --   --    BILITOT <0.2  --   --   --         Assessment:    32year old M with PMH significant for anxiety, depression, and substance/EtOH abuse as well as recent NSAID use who was admitted with melena, hematemesis, and anemia. Plan:  Monitor H/H and overt signs of GI bleeding. CT scan did not show any evidence of cirrhosis. Etiology therefore most likely related to upper GI mucosal disease. No need for octreotide drip. Keep NPO  PPI infusion  Plan for EGD today  EtOH cessation  Supportive care       JC English - CNP    [address]    [phone].  Also available via [employer]

## 2022-07-27 VITALS
OXYGEN SATURATION: 97 % | HEIGHT: 72 IN | HEART RATE: 98 BPM | DIASTOLIC BLOOD PRESSURE: 94 MMHG | SYSTOLIC BLOOD PRESSURE: 168 MMHG | BODY MASS INDEX: 42.66 KG/M2 | WEIGHT: 315 LBS | TEMPERATURE: 97.7 F | RESPIRATION RATE: 20 BRPM

## 2022-07-27 LAB
HCT VFR BLD CALC: 28.2 % (ref 40.5–52.5)
HCT VFR BLD CALC: 29.8 % (ref 40.5–52.5)
HEMOGLOBIN: 10.1 G/DL (ref 13.5–17.5)
HEMOGLOBIN: 9.6 G/DL (ref 13.5–17.5)

## 2022-07-27 PROCEDURE — 85018 HEMOGLOBIN: CPT

## 2022-07-27 PROCEDURE — 94660 CPAP INITIATION&MGMT: CPT

## 2022-07-27 PROCEDURE — 2580000003 HC RX 258

## 2022-07-27 PROCEDURE — C9113 INJ PANTOPRAZOLE SODIUM, VIA: HCPCS

## 2022-07-27 PROCEDURE — A4216 STERILE WATER/SALINE, 10 ML: HCPCS

## 2022-07-27 PROCEDURE — 6370000000 HC RX 637 (ALT 250 FOR IP)

## 2022-07-27 PROCEDURE — 36415 COLL VENOUS BLD VENIPUNCTURE: CPT

## 2022-07-27 PROCEDURE — 85014 HEMATOCRIT: CPT

## 2022-07-27 PROCEDURE — 2500000003 HC RX 250 WO HCPCS: Performed by: INTERNAL MEDICINE

## 2022-07-27 PROCEDURE — 6370000000 HC RX 637 (ALT 250 FOR IP): Performed by: NURSE PRACTITIONER

## 2022-07-27 PROCEDURE — 6370000000 HC RX 637 (ALT 250 FOR IP): Performed by: PHYSICIAN ASSISTANT

## 2022-07-27 PROCEDURE — 6360000002 HC RX W HCPCS

## 2022-07-27 RX ORDER — LISINOPRIL 20 MG/1
20 TABLET ORAL DAILY
Qty: 30 TABLET | Refills: 3 | Status: SHIPPED | OUTPATIENT
Start: 2022-07-27

## 2022-07-27 RX ORDER — AMLODIPINE BESYLATE 10 MG/1
10 TABLET ORAL DAILY
Qty: 30 TABLET | Refills: 3 | Status: SHIPPED | OUTPATIENT
Start: 2022-07-27

## 2022-07-27 RX ORDER — PANTOPRAZOLE SODIUM 40 MG/1
40 TABLET, DELAYED RELEASE ORAL
Qty: 60 TABLET | Refills: 1 | Status: SHIPPED | OUTPATIENT
Start: 2022-07-27

## 2022-07-27 RX ADMIN — LABETALOL HYDROCHLORIDE 10 MG: 5 INJECTION INTRAVENOUS at 03:34

## 2022-07-27 RX ADMIN — Medication 10 ML: at 09:46

## 2022-07-27 RX ADMIN — Medication 10 ML: at 03:34

## 2022-07-27 RX ADMIN — LABETALOL HYDROCHLORIDE 10 MG: 5 INJECTION INTRAVENOUS at 12:03

## 2022-07-27 RX ADMIN — Medication 40 MG: at 09:43

## 2022-07-27 RX ADMIN — Medication 100 MG: at 09:43

## 2022-07-27 RX ADMIN — CHLORDIAZEPOXIDE HYDROCHLORIDE 10 MG: 5 CAPSULE ORAL at 09:43

## 2022-07-27 ASSESSMENT — PAIN SCALES - GENERAL
PAINLEVEL_OUTOF10: 0
PAINLEVEL_OUTOF10: 0

## 2022-07-27 NOTE — PROGRESS NOTES
RT paged to place pt on bipap as [facility] reports his sat dropping into 40s while asleep. Pt quickly awakens and corrects himself. PM NP notified.

## 2022-07-27 NOTE — DISCHARGE SUMMARY
Hospital Medicine Discharge Summary    Patient: Peter Akbar     Age: 32 y.o. Gender: male  : 1995   MRN: [id number]  Code status: Full code    Admit Date: 2022   Discharge Date: 2022    Disposition:  Home    Condition at Discharge: Stable    Primary Care Provider: No primary care provider on file. Admitting Physician: Lloyd Barber DO  Discharge Physician: Cresencio Ocampo MD     Discharge Diagnoses: Active Hospital Problems    Diagnosis     Alkaline phosphatase elevation [R74.8]      Priority: Medium    Prolonged Q-T interval on ECG [R94.31]      Priority: Medium    Upper GI bleed [K92.2]      Priority: Medium    Alcohol dependence ([address] [F10.20]      Priority: Medium    Hypokalemia [E87.6]      Priority: Medium    Excessive use of nonsteroidal anti-inflammatory drug (NSAID) [F19.90]      Priority: Medium    Acute blood loss anemia [D62]      Priority: Medium       Hospital Course:     32 y.o. male with a history of alcohol use who presented to [facility] with 4 days of black tarry stools. Patient also reports 2 nights ago he had emesis of the dark red/black color. He made himself vomit again yesterday to see if it was still the same color and it was his normal food. Patient describes that he has had some abdominal discomfort that occurred today, but nothing that he would describe as pain. Woodrow Misael He thinks he has a generally high pain tolerance. Additionally today patient did have some lightheadedness. He continues to have some nausea but no emesis. Throughout the past few days he has had normal appetite but does say he has not eaten much today. Denies any headache, fever, chills, chest pain, or shortness of breath. Patient reports that he has generalized body aches and when he goes to work as a . Patient reports taking a large amount of ibuprofen, change milligram tablets 6 tablets once a day 3 to 4 days a week for the past 3 months.     Patient does consume large amounts of alcohol 1 L of malt liquor/hard liquor for the last year almost every day. He does state about a week ago he did not drink for 3 days and did endorse increased anxiety and trouble sleeping but denies any murmurs. His alcohol intake prior has been more than 2 drinks per night for the last 2 to 3 years. He states that he knows this is a problem and one of the reasons he presented to the ED, he would like to get help quitting. He has scheduled with a new PCP but cannot be seen until November. He also smokes weed. Assessment/Plan:    GI Bleed likely 2/2 Esophagitis  - Could be related to GERD, EtOH use and heavy NSAID use recently. - No further bleeding  - EGD completed, no active bleeding  - Continue BID PPI    Acute blood loss anemia  - Initial 11.0, baseline ~15  - Likely 2/2 to upper GI bleed as above    Hypokalemia  Prolonged QT interval  - Initial: 3.0 and new T-wave inversions and prolonged QT interval on EKG  - Unclear etiology, patient is not taking prescribed diuretics    Elevated Alkaline Phosphatase  - Unclear etiology as liver enzymes are near wnl  - GGT    Alcohol Dependence  - Treated with [facility] protocol    HTN  - Possibly 2/2 undiagnosed DELPHINE  - Non-complaint with regimen at home        Exam:   BP (!) 168/94   Pulse 98   Temp 97.7 °F (36.5 °C) (Oral)   Resp 20   Ht 6' (1.829 m)   Wt (!) 351 lb 6.4 oz (159.4 kg)   SpO2 97%   BMI 47.66 kg/m²   General appearance:  No apparent distress, appears stated age and cooperative. HEENT:  Pupils equal, round, and reactive to light. Conjunctivae/corneas clear. Neck:  Supple, no jugular venous distention. Trachea midline with full range of motion. Respiratory:  Normal respiratory effort. Clear to auscultation, bilaterally without Rales/Wheezes/Rhonchi. Cardiovascular:  Regular rate and rhythm with normal S1/S2 without murmurs, rubs or gallops.   Abdomen:  Soft, non-tender, non-distended with normal bowel sounds. Musculoskelatal:  No clubbing, cyanosis or edema bilaterally. Full range of motion without deformity. Neurologic:  Neurovascularly intact without any focal sensory/motor deficits. Cranial nerves: II-XII intact, grossly non-focal.  Psychiatric:  Alert and oriented, thought content appropriate, normal insight  Skin:  Skin color, texture, turgor normal.  No rashes or lesions. Capillary Refill:  Brisk,< 3 seconds   Peripheral Pulses:  +2 palpable, equal bilaterally     Patient Discharge Instructions: Follow up:  1. Primary Care Provider     Discharge Medications:   Discharge Medication List as of 7/27/2022  1:58 PM        START taking these medications    Details   pantoprazole (PROTONIX) 40 MG tablet Take 1 tablet by mouth in the morning and 1 tablet in the evening. Take before meals. , Disp-60 tablet, R-1Normal           Discharge Medication List as of 7/27/2022  1:58 PM        CONTINUE these medications which have CHANGED    Details   lisinopril (PRINIVIL;ZESTRIL) 20 MG tablet Take 1 tablet by mouth in the morning., Disp-30 tablet, R-3Normal      amLODIPine (NORVASC) 10 MG tablet Take 1 tablet by mouth in the morning., Disp-30 tablet, R-3Normal           Discharge Medication List as of 7/27/2022  1:58 PM        Discharge Medication List as of 7/27/2022  1:58 PM        STOP taking these medications       hydroCHLOROthiazide (HYDRODIURIL) 25 MG tablet Comments:   Reason for Stopping:                 Significant Test Results    No results found. Consults:     IP CONSULT TO SOCIAL WORK  IP CONSULT TO GI    Labs:  For convenience and continuity at follow-up the following most recent labs are provided:    Lab Results   Component Value Date/Time    WBC 9.1 07/25/2022 03:28 PM    HGB 10.1 07/27/2022 08:39 AM    HCT 29.8 07/27/2022 08:39 AM    MCV 94.3 07/25/2022 03:28 PM     07/25/2022 03:28 PM     07/26/2022 08:45 AM    K 4.1 07/26/2022 08:45 AM     07/26/2022 08:45 AM    CO2 22 07/26/2022 08:45 AM    BUN 11 07/26/2022 08:45 AM    CREATININE 0.8 07/26/2022 08:45 AM    CALCIUM 8.7 07/26/2022 08:45 AM    TROPONINI <0.01 07/25/2022 03:28 PM    ALKPHOS 167 07/25/2022 03:28 PM    ALT 35 07/25/2022 03:28 PM    AST 50 07/25/2022 03:28 PM    BILITOT <0.2 07/25/2022 03:28 PM    BILIDIR <0.2 08/22/2021 07:46 AM    LABALBU 4.3 07/25/2022 03:28 PM    LABA1C 5.2 07/26/2022 04:21 AM     Lab Results   Component Value Date    INR 0.98 07/25/2022         The patient was seen and examined on day of discharge and this discharge summary is in conjunction with any daily progress note from day of discharge. Time spent on discharge is more than 30 minutes in the examination, evaluation, counseling and review of medications and discharge plan.       Signed:    Kitty Guido MD   8/29/2022

## 2022-07-27 NOTE — PROGRESS NOTES
Assessment complete and charted earlier in shift. Pt without signs of bleeding overnight and pt remains hypertensive requiring IV BP meds x2 with improvement noted, temporarily. Pt has not been scoring on CIWA scale since start of shift. Pt did take off NC and sleep with SpO2 dropping into 40s and quickly returning to normal level. Bipap placed and pt tolerated for several hours. RT rounded when pt had just taken off his bipap and noted that he had again fallen asleep without his NC on and O2 sat was in 70s at times. RT replaced bipap and writer encouraged pt to continue to wear bipap while asleep but that he must have his NC on at minimum. Pt agreeable but forgetful at times. Call light within reach, will continue to monitor.

## 2022-07-27 NOTE — PLAN OF CARE
Problem: Safety - Adult  Goal: Free from fall injury  Outcome: Progressing Towards Goal  Flowsheets (Taken 7/27/2022 1104)  Free From Fall Injury:   Instruct family/caregiver on patient safety   Based on caregiver fall risk screen, instruct family/caregiver to ask for assistance with transferring infant if caregiver noted to have fall risk factors     Problem: Anxiety  Goal: Will report anxiety at manageable levels  Description: INTERVENTIONS:  1. Administer medication as ordered  2. Teach and rehearse alternative coping skills  3. Provide emotional support with 1:1 interaction with staff  Outcome: Progressing Towards Goal  Flowsheets (Taken 7/27/2022 1104)  Will report anxiety at manageable levels:   Administer medication as ordered   Teach and rehearse alternative coping skills   Provide emotional support with 1:1 interaction with staff     Problem: Neurosensory - Adult  Goal: Absence of seizures  Outcome: Progressing Towards Goal  Flowsheets (Taken 7/27/2022 1104)  Absence of seizures:   Monitor for seizure activity. If seizure occurs, document type and location of movements and any associated apnea   Administer anticonvulsants as ordered   Support airway/breathing, administer oxygen as needed   Diagnostic studies as ordered     Problem: Anxiety  Goal: Will report anxiety at manageable levels  Description: INTERVENTIONS:  1. Administer medication as ordered  2. Teach and rehearse alternative coping skills  3. Provide emotional support with 1:1 interaction with staff  Outcome: Progressing Towards Goal  Flowsheets (Taken 7/27/2022 1104)  Will report anxiety at manageable levels:   Administer medication as ordered   Teach and rehearse alternative coping skills   Provide emotional support with 1:1 interaction with staff     Problem: Neurosensory - Adult  Goal: Absence of seizures  Outcome: Progressing Towards Goal  Flowsheets (Taken 7/27/2022 1104)  Absence of seizures:   Monitor for seizure activity.   If seizure occurs, document type and location of movements and any associated apnea   Administer anticonvulsants as ordered   Support airway/breathing, administer oxygen as needed   Diagnostic studies as ordered

## 2022-07-27 NOTE — CARE COORDINATION
Chart review day 2. Management per GI and IM. Spoke with pt at bedside, he no longer is willing to go to inpatient treatment. Dr. Buzz Mabry made aware. Call placed to [facility] to notify pt refusing inpatient treatment. Pt states that he has \"a good support system\" and he is agreeable to seeking outpatient treatment. Resources given. Riri Parr RN     Spoke with pt at bedside, pt agreeable to being referred to Abdias Carvalho at [facility] for discussion regarding outpatient treatment options. Call placed to Riri, she will reach out to pt and call back with potential treatment plan.  Riri Parr RN

## 2022-07-27 NOTE — PROGRESS NOTES
07/27/22 0151   NIV Type   $NIV $Daily Charge   Skin Assessment Clean, dry, & intact   Skin Protection for O2 Device Yes   Location Nose   NIV Started/Stopped On   Equipment Type v60   Mode Bilevel   Mask Type Full face mask   Mask Size Large   Settings/Measurements   PIP Observed 17 cm H20   IPAP 16 cmH20   CPAP/EPAP 8 cmH2O   Rate Ordered 15   Resp 17   FiO2  30 %   I Time/ I Time % 0.9 s   Vt Exhaled 570 mL   Minute Volume 9.5 Liters   Mask Leak (lpm) 35 lpm   Comfort Level Good   Using Accessory Muscles No   SpO2 99   Patient's Home Machine No   Alarm Settings   Alarms On Y   Low Pressure 6 cmH2O   High Pressure  30 cmH2O   Apnea (secs) 20 secs   RR Low  6   RR High 40 br/min

## 2022-07-27 NOTE — PROGRESS NOTES
Discharge education provided to patient at bedside. Tele box retrieved, [facility] notified of patient's dc orders. Awaiting on ride.

## 2022-07-27 NOTE — CARE COORDINATION
CASE MANAGEMENT DISCHARGE SUMMARY      Discharge to: home has contact information for [facility] treatment and substance abuse resource packet.         New Durable Medical Equipment  ordered/agency: N/A    Transportation:    Family/car: private car     Confirmed discharge plan with:     Patient: yes     RN, name: Berenice Oppenheim

## 2022-07-27 NOTE — PROGRESS NOTES
Progress Note    Patient Alex Muñoz  MRN: [id number]  YOB: 1995 Age: 32 y.o. Sex: male  Room: [address] [place] [address]       Admitting Physician: Jorge Noel DO   Date of Admission: 7/25/2022  3:11 PM   Primary Care Physician: No primary care provider on file. Subjective:  Alex Muñoz was seen and examined. We are following for upper GI bleed, esophagitis. -- Patient with decreased O2 sats while sleeping. As low as SpO2 in the 40s  -- Denies abdominal pain or nausea/vomiting  -- Hgb stable  -- Denies overt GI bleeding. Reports brown BM this morning. ROS:  Constitutional: Denies fever, no change in appetite  Respiratory: Denies cough or shortness of breath  Cardiovascular: Denies chest pain or edema    Objective:  Vital Signs:   Vitals:    07/27/22 1109   BP: (!) 171/103   Pulse: 85   Resp: 20   Temp: 97.7 °F (36.5 °C)   SpO2: 97%         Physical Exam:  Constitutional: Alert and oriented x 4. No acute distress. Respiratory: Respirations nonlabored, no crepitus  GI: Abdomen nondistended, soft, and nontender. Neurological: No focal deficits noted. No asterixis.     Intake/Output:    Intake/Output Summary (Last 24 hours) at 7/27/2022 1210  Last data filed at 7/27/2022 0946  Gross per 24 hour   Intake 1450 ml   Output --   Net 1450 ml        Current Medications:  Current Facility-Administered Medications   Medication Dose Route Frequency Provider Last Rate Last Admin    labetalol (NORMODYNE;TRANDATE) injection 10 mg  10 mg IntraVENous Q4H PRN Cb Muniz MD   10 mg at 07/27/22 1203    nicotine polacrilex (COMMIT) lozenge 2 mg  2 mg Oral Q2H PRN Cb Muniz MD   2 mg at 07/26/22 1726    0.9 % sodium chloride infusion   IntraVENous PRN CARLYLE Pardo        thiamine tablet 100 mg  100 mg Oral Daily CARLYLE Whittington   100 mg at 07/27/22 0943    LORazepam (ATIVAN) tablet 1 mg  1 mg Oral Q1H PRN CARLYLE Pardo   1 mg at 07/26/22 1252    Or    LORazepam (ATIVAN) injection 1 mg  1 mg IntraVENous Q1H PRN CARLYLE Pro        Or    LORazepam (ATIVAN) tablet 2 mg  2 mg Oral Q1H PRN Isrrael Lees PA   2 mg at 07/26/22 0438    Or    LORazepam (ATIVAN) injection 2 mg  2 mg IntraVENous Q1H PRN Isrrael Lees, PA        Or    LORazepam (ATIVAN) tablet 3 mg  3 mg Oral Q1H PRN Isrrael Lees, PA   3 mg at 07/26/22 1945    Or    LORazepam (ATIVAN) injection 3 mg  3 mg IntraVENous Q1H PRN Isrrael Lees PA        Or    LORazepam (ATIVAN) tablet 4 mg  4 mg Oral Q1H PRN Isrrael Lees PA   4 mg at 07/25/22 2218    Or    LORazepam (ATIVAN) injection 4 mg  4 mg IntraVENous Q1H PRN CARLYLE Whittington        sodium chloride flush 0.9 % injection 5-40 mL  5-40 mL IntraVENous 2 times per day Tavon Rodriguez MD   10 mL at 07/27/22 0946    sodium chloride flush 0.9 % injection 5-40 mL  5-40 mL IntraVENous PRN Tavon Rodriguez MD   10 mL at 07/27/22 0334    0.9 % sodium chloride infusion   IntraVENous PRN Tavon Rodriguez MD        acetaminophen (TYLENOL) tablet 650 mg  650 mg Oral Q6H PRN Tavon Rodriguez MD        Or    acetaminophen (TYLENOL) suppository 650 mg  650 mg Rectal Q6H PRN Tavon Rodriguez MD        promethazine (PHENERGAN) tablet 12.5 mg  12.5 mg Oral Q6H PRN Tavon Rodriguez MD        pantoprazole (PROTONIX) 40 mg in sodium chloride (PF) 10 mL injection  40 mg IntraVENous Q12H Tavon Rodriguez MD   40 mg at 07/27/22 0943    nicotine (NICODERM CQ) 21 MG/24HR 1 patch  1 patch TransDERmal Daily JC Gunn CNP   1 patch at 07/27/22 9797         Recent Imaging:   CT ABDOMEN PELVIS W IV CONTRAST Additional Contrast? None  Narrative: EXAMINATION:  CT OF THE ABDOMEN AND PELVIS WITH CONTRAST 7/25/2022 4:01 pm    TECHNIQUE:  CT of the abdomen and pelvis was performed with the administration of  intravenous contrast. Multiplanar reformatted images are provided for review.   Automated exposure control, iterative reconstruction, and/or weight based  adjustment of the mA/kV was utilized to reduce the radiation dose to as low  as reasonably achievable. COMPARISON:  CT abdomen and pelvis dated 08/20/2021    HISTORY:  ORDERING SYSTEM PROVIDED HISTORY: [facility] protocol  TECHNOLOGIST PROVIDED HISTORY:  Additional Contrast?->None  Reason for exam:->[facility] protocol  Decision Support Exception - unselect if not a suspected or confirmed  emergency medical condition->Emergency Medical Condition (MA)  Reason for Exam: Rectal bleeding, x4 days, vomiting    FINDINGS:  Lower Chest:  Visualized portion of the lower chest demonstrates no acute  abnormality. Organs: Hepatic steatosis without evidence of intrahepatic biliary ductal  dilation. The gallbladder is unremarkable. The spleen, pancreas and adrenal  glands are unremarkable. The kidneys enhance symmetrically without evidence  of hydronephrosis. GI/Bowel: Limited study to evaluate for evidence of active bleeding secondary  to inappropriately delayed phases. No gross evidence of active extravasation  of contrast.  Stomach and duodenal sweep demonstrate no acute abnormality. There is no evidence of bowel obstruction. No evidence of abnormal bowel  wall thickening or distension. The appendix is visualized and is  unremarkable. No evidence of acute appendicitis. Pelvis:  Bladder is unremarkable in appearance. No acute abnormality of the  prostate. Peritoneum/Retroperitoneum: No evidence of ascites or free air. No evidence  of lymphadenopathy. Aorta is normal in caliber. Bones/Soft Tissues:  No acute abnormality of the visualized osseous  structures. Visualized soft tissues are unremarkable. Impression: Limited study to evaluate for evidence of active bleeding secondary to  inappropriately delayed phases. No gross evidence of active extravasation of  contrast.    Otherwise no acute pathology in the abdomen and pelvis.   XR CHEST PORTABLE  Narrative: EXAMINATION:  ONE XRAY VIEW OF THE CHEST    7/25/2022 3:56 pm    COMPARISON:  Chest x-ray dated 08/20/2021. HISTORY:  ORDERING SYSTEM PROVIDED HISTORY: sob  TECHNOLOGIST PROVIDED HISTORY:  Reason for exam:->sob  Reason for Exam: sob    FINDINGS:  HEART/MEDIASTINUM: The cardiomediastinal silhouette is within normal limits. PLEURA/LUNGS: There are no focal consolidations or pleural effusions. There  is no appreciable pneumothorax. BONES/SOFT TISSUE: No acute abnormality. Impression: No radiographic evidence of acute pulmonary disease. Labs:   Recent Labs     07/25/22  1528 07/25/22  2058 07/26/22  0845 07/26/22  1636 07/26/22  2043 07/27/22  0419 07/27/22  0839   HGB 11.0*   < > 10.0* 10.2* 9.3* 9.6* 10.1*   WBC 9.1  --   --   --   --   --   --    PROT 7.0  --   --   --   --   --   --    LABALBU 4.3  --   --   --   --   --   --    ALKPHOS 167*  --   --   --   --   --   --    ALT 35  --   --   --   --   --   --    AST 50*  --   --   --   --   --   --    BILITOT <0.2  --   --   --   --   --   --     < > = values in this interval not displayed. Assessment:    32year old M with PMH significant for anxiety, depression, and substance/EtOH abuse as well as recent NSAID use who was admitted with melena, hematemesis, and anemia. 7/26 EGD: LA grade C mid to distal esophagitis without active bleeding s/p biopsy    No overt bleeding overnight or this morning. Hgb stable at 11. Offers no GI complaints at this time. Plan:  Monitor H/H and overt signs of GI bleeding  Continue PPI twice daily  Advance to soft diet  Discussed reflux precautions---GERD diet: avoid carbonated/acid beverages, fried and fatty foods. peppermint, chocolate, alcohol, coffee, citrus fruits and juices, tomoato products; avoid lying down for 2 to 3 hours after eating, avoid tight fitting clothing. Weight loss frequent helps heartburn/reflux especially with the presence of a hiatal hernia.     EtOH cessation  Supportive care       [facility] Mix - [facility]    [phone].  Also available via [employer]

## 2022-07-27 NOTE — PLAN OF CARE
disturbance, including medications, impaired vision or hearing, underlying metabolic abnormalities, dehydration, psychiatric diagnoses, and notify attending LIP  2. [place] high risk fall precautions, as indicated  3. Provide frequent short contacts to provide reality reorientation, refocusing and direction  4. Decrease environmental stimuli, including noise as appropriate  5. Monitor and intervene to maintain adequate nutrition, hydration, elimination, sleep and activity  6. If unable to ensure safety without constant attention obtain sitter and review sitter guidelines with assigned personnel  7. Initiate Psychosocial CNS and Spiritual Care consult, as indicated  Outcome: Completed     Problem: Drug Abuse/Detox  Goal: Will have no detox symptoms and will verbalize plan for changing drug-related behavior  Description: INTERVENTIONS:  1. Administer medication as ordered  2. Monitor physical status  3. Provide emotional support with 1:1 interaction with staff  4. Encourage  recovery focused treatment   Outcome: Completed     Problem: Neurosensory - Adult  Goal: Achieves stable or improved neurological status  Outcome: Completed  Goal: Absence of seizures  7/27/2022 1356 by Mamie Duverney, RN  Outcome: Completed  7/27/2022 1104 by Mamie Duverney, RN  Outcome: Progressing  Flowsheets (Taken 7/27/2022 1104)  Absence of seizures:   Monitor for seizure activity.   If seizure occurs, document type and location of movements and any associated apnea   Administer anticonvulsants as ordered   Support airway/breathing, administer oxygen as needed   Diagnostic studies as ordered  Goal: Remains free of injury related to seizures activity  Outcome: Completed  Goal: Achieves maximal functionality and self care  Outcome: Completed     Problem: Respiratory - Adult  Goal: Achieves optimal ventilation and oxygenation  Outcome: Completed     Problem: Cardiovascular - Adult  Goal: Maintains optimal cardiac output and hemodynamic stability  Outcome: Completed  Goal: Absence of cardiac dysrhythmias or at baseline  Outcome: Completed     Problem: Gastrointestinal - Adult  Goal: Minimal or absence of nausea and vomiting  Outcome: Completed  Goal: Maintains or returns to baseline bowel function  Outcome: Completed  Goal: Maintains adequate nutritional intake  Outcome: Completed  Goal: Establish and maintain optimal ostomy function  Outcome: Completed

## 2022-07-27 NOTE — PROGRESS NOTES
07/27/22 0353   NIV Type   Skin Assessment Clean, dry, & intact   Skin Protection for O2 Device Yes   Location Nose   NIV Started/Stopped On   Equipment Type v60   Mode Bilevel   Mask Type Full face mask   Mask Size Large   Settings/Measurements   PIP Observed 16 cm H20   IPAP 16 cmH20   CPAP/EPAP 8 cmH2O   Rate Ordered 10   Resp 20   FiO2  30 %   I Time/ I Time % 0.9 s   Vt Exhaled 720 mL   Minute Volume 12.8 Liters   Mask Leak (lpm) 38 lpm   Comfort Level Good   Using Accessory Muscles No   Patient's Home Machine No   Alarm Settings   Alarms On Y   Low Pressure 6 cmH2O   High Pressure  30 cmH2O   Apnea (secs) 20 secs   RR Low  6   RR High 40 br/min

## 2022-12-16 ENCOUNTER — HOSPITAL ENCOUNTER (EMERGENCY)
Age: 27
Discharge: HOME | End: 2022-12-17
Attending: EMERGENCY MEDICINE
Payer: MEDICAID

## 2022-12-16 ENCOUNTER — APPOINTMENT (OUTPATIENT)
Dept: CT IMAGING | Age: 27
End: 2022-12-16
Payer: MEDICAID

## 2022-12-16 ENCOUNTER — APPOINTMENT (OUTPATIENT)
Dept: GENERAL RADIOLOGY | Age: 27
End: 2022-12-16
Payer: MEDICAID

## 2022-12-16 DIAGNOSIS — J18.9 PNEUMONIA DUE TO INFECTIOUS ORGANISM, UNSPECIFIED LATERALITY, UNSPECIFIED PART OF LUNG: Primary | ICD-10-CM

## 2022-12-16 LAB
A/G RATIO: 1.1 (ref 1.1–2.2)
ALBUMIN SERPL-MCNC: 3.7 G/DL (ref 3.4–5)
ALP BLD-CCNC: 140 U/L (ref 40–129)
ALT SERPL-CCNC: 23 U/L (ref 10–40)
ANION GAP SERPL CALCULATED.3IONS-SCNC: 15 MMOL/L (ref 3–16)
AST SERPL-CCNC: 25 U/L (ref 15–37)
BASOPHILS ABSOLUTE: 0.1 K/UL (ref 0–0.2)
BASOPHILS RELATIVE PERCENT: 0.7 %
BILIRUB SERPL-MCNC: <0.2 MG/DL (ref 0–1)
BUN BLDV-MCNC: 9 MG/DL (ref 7–20)
CALCIUM SERPL-MCNC: 8 MG/DL (ref 8.3–10.6)
CHLORIDE BLD-SCNC: 99 MMOL/L (ref 99–110)
CO2: 22 MMOL/L (ref 21–32)
CREAT SERPL-MCNC: 0.8 MG/DL (ref 0.9–1.3)
EOSINOPHILS ABSOLUTE: 0.1 K/UL (ref 0–0.6)
EOSINOPHILS RELATIVE PERCENT: 1.5 %
GFR SERPL CREATININE-BSD FRML MDRD: >60 ML/MIN/{1.73_M2}
GLUCOSE BLD-MCNC: 132 MG/DL (ref 70–99)
HCT VFR BLD CALC: 36.9 % (ref 40.5–52.5)
HEMOGLOBIN: 11.4 G/DL (ref 13.5–17.5)
INFLUENZA A: NOT DETECTED
INFLUENZA B: NOT DETECTED
LACTIC ACID: 2.1 MMOL/L (ref 0.4–2)
LYMPHOCYTES ABSOLUTE: 1.5 K/UL (ref 1–5.1)
LYMPHOCYTES RELATIVE PERCENT: 19.8 %
MAGNESIUM: 2.4 MG/DL (ref 1.8–2.4)
MCH RBC QN AUTO: 24.2 PG (ref 26–34)
MCHC RBC AUTO-ENTMCNC: 31 G/DL (ref 31–36)
MCV RBC AUTO: 77.9 FL (ref 80–100)
MONOCYTES ABSOLUTE: 1.1 K/UL (ref 0–1.3)
MONOCYTES RELATIVE PERCENT: 14.1 %
NEUTROPHILS ABSOLUTE: 4.9 K/UL (ref 1.7–7.7)
NEUTROPHILS RELATIVE PERCENT: 63.9 %
PDW BLD-RTO: 19.9 % (ref 12.4–15.4)
PLATELET # BLD: 289 K/UL (ref 135–450)
PMV BLD AUTO: 7.7 FL (ref 5–10.5)
POTASSIUM REFLEX MAGNESIUM: 3.5 MMOL/L (ref 3.5–5.1)
RBC # BLD: 4.73 M/UL (ref 4.2–5.9)
SARS-COV-2 RNA, RT PCR: NOT DETECTED
SODIUM BLD-SCNC: 136 MMOL/L (ref 136–145)
TOTAL PROTEIN: 7.1 G/DL (ref 6.4–8.2)
TROPONIN: <0.01 NG/ML
WBC # BLD: 7.7 K/UL (ref 4–11)

## 2022-12-16 PROCEDURE — 6360000004 HC RX CONTRAST MEDICATION: Performed by: PHYSICIAN ASSISTANT

## 2022-12-16 PROCEDURE — 6370000000 HC RX 637 (ALT 250 FOR IP): Performed by: PHYSICIAN ASSISTANT

## 2022-12-16 PROCEDURE — 85025 COMPLETE CBC W/AUTO DIFF WBC: CPT

## 2022-12-16 PROCEDURE — 80053 COMPREHEN METABOLIC PANEL: CPT

## 2022-12-16 PROCEDURE — 6360000002 HC RX W HCPCS: Performed by: PHYSICIAN ASSISTANT

## 2022-12-16 PROCEDURE — 83605 ASSAY OF LACTIC ACID: CPT

## 2022-12-16 PROCEDURE — 93005 ELECTROCARDIOGRAM TRACING: CPT | Performed by: EMERGENCY MEDICINE

## 2022-12-16 PROCEDURE — 99285 EMERGENCY DEPT VISIT HI MDM: CPT

## 2022-12-16 PROCEDURE — 87636 SARSCOV2 & INF A&B AMP PRB: CPT

## 2022-12-16 PROCEDURE — 71260 CT THORAX DX C+: CPT | Performed by: PHYSICIAN ASSISTANT

## 2022-12-16 PROCEDURE — 2580000003 HC RX 258: Performed by: PHYSICIAN ASSISTANT

## 2022-12-16 PROCEDURE — 83735 ASSAY OF MAGNESIUM: CPT

## 2022-12-16 PROCEDURE — 96361 HYDRATE IV INFUSION ADD-ON: CPT

## 2022-12-16 PROCEDURE — 84484 ASSAY OF TROPONIN QUANT: CPT

## 2022-12-16 PROCEDURE — 71046 X-RAY EXAM CHEST 2 VIEWS: CPT

## 2022-12-16 PROCEDURE — 96360 HYDRATION IV INFUSION INIT: CPT

## 2022-12-16 RX ORDER — DEXAMETHASONE 4 MG/1
6 TABLET ORAL ONCE
Status: COMPLETED | OUTPATIENT
Start: 2022-12-16 | End: 2022-12-16

## 2022-12-16 RX ORDER — IPRATROPIUM BROMIDE AND ALBUTEROL SULFATE 2.5; .5 MG/3ML; MG/3ML
1 SOLUTION RESPIRATORY (INHALATION) ONCE
Status: COMPLETED | OUTPATIENT
Start: 2022-12-16 | End: 2022-12-16

## 2022-12-16 RX ORDER — 0.9 % SODIUM CHLORIDE 0.9 %
1000 INTRAVENOUS SOLUTION INTRAVENOUS ONCE
Status: COMPLETED | OUTPATIENT
Start: 2022-12-16 | End: 2022-12-16

## 2022-12-16 RX ORDER — DOXYCYCLINE HYCLATE 100 MG
100 TABLET ORAL ONCE
Status: COMPLETED | OUTPATIENT
Start: 2022-12-16 | End: 2022-12-16

## 2022-12-16 RX ADMIN — IOPAMIDOL 75 ML: 755 INJECTION, SOLUTION INTRAVENOUS at 22:44

## 2022-12-16 RX ADMIN — SODIUM CHLORIDE 1000 ML: 9 INJECTION, SOLUTION INTRAVENOUS at 19:48

## 2022-12-16 RX ADMIN — DOXYCYCLINE HYCLATE 100 MG: 100 TABLET, COATED ORAL at 23:42

## 2022-12-16 RX ADMIN — DEXAMETHASONE 6 MG: 4 TABLET ORAL at 19:48

## 2022-12-16 RX ADMIN — IPRATROPIUM BROMIDE AND ALBUTEROL SULFATE 1 AMPULE: .5; 2.5 SOLUTION RESPIRATORY (INHALATION) at 19:48

## 2022-12-16 ASSESSMENT — PAIN DESCRIPTION - PAIN TYPE: TYPE: ACUTE PAIN

## 2022-12-16 ASSESSMENT — PAIN DESCRIPTION - FREQUENCY: FREQUENCY: CONTINUOUS

## 2022-12-16 ASSESSMENT — PAIN DESCRIPTION - LOCATION: LOCATION: CHEST

## 2022-12-16 ASSESSMENT — PAIN DESCRIPTION - DESCRIPTORS: DESCRIPTORS: SHARP;STABBING

## 2022-12-16 ASSESSMENT — PAIN - FUNCTIONAL ASSESSMENT: PAIN_FUNCTIONAL_ASSESSMENT: 0-10

## 2022-12-16 ASSESSMENT — PAIN DESCRIPTION - ONSET: ONSET: ON-GOING

## 2022-12-16 ASSESSMENT — PAIN DESCRIPTION - ORIENTATION: ORIENTATION: MID

## 2022-12-17 VITALS
HEIGHT: 72 IN | TEMPERATURE: 98.3 F | SYSTOLIC BLOOD PRESSURE: 133 MMHG | OXYGEN SATURATION: 93 % | WEIGHT: 280 LBS | DIASTOLIC BLOOD PRESSURE: 86 MMHG | BODY MASS INDEX: 37.93 KG/M2 | RESPIRATION RATE: 23 BRPM | HEART RATE: 93 BPM

## 2022-12-17 LAB
EKG ATRIAL RATE: 118 BPM
EKG DIAGNOSIS: NORMAL
EKG P AXIS: 24 DEGREES
EKG P-R INTERVAL: 118 MS
EKG Q-T INTERVAL: 320 MS
EKG QRS DURATION: 90 MS
EKG QTC CALCULATION (BAZETT): 448 MS
EKG R AXIS: 69 DEGREES
EKG T AXIS: -79 DEGREES
EKG VENTRICULAR RATE: 118 BPM

## 2022-12-17 RX ORDER — DOXYCYCLINE HYCLATE 100 MG
100 TABLET ORAL 2 TIMES DAILY
Qty: 14 TABLET | Refills: 0 | Status: SHIPPED | OUTPATIENT
Start: 2022-12-17 | End: 2022-12-24

## 2022-12-17 RX ORDER — ALBUTEROL SULFATE 90 UG/1
2 AEROSOL, METERED RESPIRATORY (INHALATION) 4 TIMES DAILY PRN
Qty: 18 G | Refills: 0 | Status: SHIPPED | OUTPATIENT
Start: 2022-12-17

## 2022-12-17 ASSESSMENT — ENCOUNTER SYMPTOMS
COUGH: 1
SORE THROAT: 1
VOMITING: 0
SHORTNESS OF BREATH: 0
DIARRHEA: 0
BACK PAIN: 0
ABDOMINAL PAIN: 0
CHEST TIGHTNESS: 1
NAUSEA: 0

## 2022-12-17 NOTE — ED NOTES
[id number] Justina Cunningham for d/c. Stable, ambulatory w/ all personal belongings.  No questions at d/c.      Yamileth Jordan RN  12/17/22 6783

## 2022-12-17 NOTE — DISCHARGE INSTRUCTIONS
You were seen in the emergency department today for cough and chest pain. You are found to have pneumonia. You were given first dose of antibiotic in the emergency department. Remainder sent over to pharmacy. I have prescribed an albuterol inhaler to use as needed for shortness of breath. Please follow-up with primary care physician in the next 3 days for reevaluation. If you develop any new or worsening symptoms immediately return the emergency department.

## 2022-12-17 NOTE — ED PROVIDER NOTES
**ADVANCED PRACTICE PROVIDER, I HAVE EVALUATED THIS [facility]  ED  EMERGENCY DEPARTMENT ENCOUNTER      Pt Name: Charley Petit  GKL:[id number]  [place] 1995  Date of evaluation: 12/16/2022  Provider: CARLYLE Paredes  Note Started: 7:45 PM EST 12/17/2022        Chief Complaint:    Chief Complaint   Patient presents with    Cough     Cough with chest pain. States has been sick with URI symptoms that started about 72 hours ago. Patient reports chest pain really only when he coughs. Denies fever, denies any home testing for COVID. Chest Pain         Nursing Notes, Past Medical Hx, Past Surgical Hx, Social Hx, Allergies, and Family Hx were all reviewed and agreed with or any disagreements were addressed in the HPI.    HPI: (Location, Duration, Timing, Severity, Quality, Assoc Sx, Context, Modifying factors)    Chief Complaint of cough and chest pain. This is a  32 y.o. male who presents via private vehicle complaining of cough and chest pain. He states that he has been sick for about 72 hours. He has past medical history of substance abuse, headache, depression and anxiety. He reports chest pain when he coughs. He denies any fevers or chills. He did take an at home COVID test which was negative. He denies any recent sick contacts. He has been wheezing for the past several days but worse today. Denies any abdominal pain, nausea or vomiting. Denies headache. He does complain of a sore throat.     PastMedical/Surgical History:      Diagnosis Date    Anxiety     Depression     Headache(784.0)     Substance abuse ([facility] [address]          Procedure Laterality Date    FRACTURE SURGERY      UPPER GASTROINTESTINAL ENDOSCOPY N/A 7/26/2022    EGD BIOPSY performed by Sander Lowe MD at [address] [place] [address]       Medications:  Discharge Medication List as of 12/17/2022  2:06 AM        CONTINUE these medications which have NOT CHANGED    Details   lisinopril (PRINIVIL;ZESTRIL) 20 MG Heart sounds: Normal heart sounds. No murmur heard. No friction rub. No gallop. Pulmonary:      Effort: Pulmonary effort is normal. No respiratory distress. Breath sounds: No stridor. Wheezing present. No rhonchi or rales. Abdominal:      General: Abdomen is flat. Palpations: Abdomen is soft. Tenderness: There is no abdominal tenderness. There is no guarding or rebound. Musculoskeletal:         General: Normal range of motion. Cervical back: Normal range of motion and neck supple. Right lower leg: No edema. Left lower leg: No edema. Skin:     General: Skin is warm and dry. Coloration: Skin is not pale. Neurological:      Mental Status: He is alert and oriented to person, place, and time. Psychiatric:         Mood and Affect: Mood normal.         Behavior: Behavior normal.       MEDICAL DECISION MAKING    Vitals:    Vitals:    12/17/22 0114 12/17/22 0144 12/17/22 0156 12/17/22 0205   BP: 139/80 133/86     Pulse:   92 93   Resp:       Temp:       TempSrc:       SpO2: 95% 93%     Weight:       Height:           LABS:  Labs Reviewed   CBC WITH AUTO DIFFERENTIAL - Abnormal; Notable for the following components:       Result Value    Hemoglobin 11.4 (*)     Hematocrit 36.9 (*)     MCV 77.9 (*)     MCH 24.2 (*)     RDW 19.9 (*)     All other components within normal limits   COMPREHENSIVE METABOLIC PANEL W/ REFLEX TO MG FOR LOW K - Abnormal; Notable for the following components:    Glucose 132 (*)     Creatinine 0.8 (*)     Calcium 8.0 (*)     Alkaline Phosphatase 140 (*)     All other components within normal limits   LACTIC ACID - Abnormal; Notable for the following components:    Lactic Acid 2.1 (*)     All other components within normal limits   COVID-19 & INFLUENZA COMBO   TROPONIN   MAGNESIUM        Remainder of labs reviewed and were negative at this time or not returned at the time of this note.     RADIOLOGY:   Non-plain film images such as CT, Ultrasound and MRI are read by the radiologist. CARLYLE Pickett have directly visualized the radiologic plain film image(s) with the below findings:      Interpretation per the Radiologist below, if available at the time of this note:    CT CHEST PULMONARY EMBOLISM W CONTRAST   Final Result   No evidence of a pulmonary embolus      Unremarkable thoracic aorta      Hazy ground-glass and reticulonodular infiltrates along the upper lobes   extending into the perihilar regions and both lung bases which probably   represents multi segmental bronchopneumonia, the etiology of which is   uncertain. Mild mediastinal and hilar adenopathy which may be infectious or reactive in   etiology but otherwise is indeterminate. Recommend short-term follow-up to   assure resolution. XR CHEST (2 VW)   Final Result   Patchy airspace disease in the left upper lobe compatible with pneumonia. XR CHEST (2 VW)    Result Date: 12/16/2022  EXAMINATION: TWO XRAY VIEWS OF THE CHEST 12/16/2022 7:07 pm COMPARISON: Chest x-ray dated 25 July 2022 HISTORY: ORDERING SYSTEM PROVIDED HISTORY: sob TECHNOLOGIST PROVIDED HISTORY: Reason for exam:->sob Reason for Exam: sob FINDINGS: Patchy airspace disease in the left upper lobe. No pneumothorax or pleural effusion. Normal cardiomediastinal silhouette     Patchy airspace disease in the left upper lobe compatible with pneumonia. No results found. MEDICAL DECISION MAKING / ED COURSE:      Is this patient to be included in the SEP-1 Core Measure due to severe sepsis or septic shock?    No   Exclusion criteria - the patient is NOT to be included for SEP-1 Core Measure due to:  May have criteria for sepsis, but does not meet criteria for severe sepsis or septic shock      PROCEDURES:   Procedures    None    Patient was given:  Medications   ipratropium-albuterol (DUONEB) nebulizer solution 1 ampule (1 ampule Inhalation Given 12/16/22 1948)   dexamethasone (DECADRON) tablet 6 mg (6 mg Oral Given strict return precautions with the patient, if his symptoms should worsen he may require hospitalization however at this time the patient feels comfortable with discharge home with oral antibiotics. I do think that this is reasonable and final vital signs are stable. Again he did not become hypoxic with ambulation. He will return for any new or worsening symptoms. Otherwise he will follow-up with primary care physician in the next 3 days for further evaluation and treatment. All questions are answered and he is discharged home in good condition. The patient tolerated their visit well. I evaluated the patient. The physician was available for consultation as needed. The patient and / or the family were informed of the results of any tests, a time was given to answer questions, a plan was proposed and they agreed with plan. I am the Primary Clinician of Record. CLINICAL IMPRESSION:  1.  Pneumonia due to infectious organism, unspecified laterality, unspecified part of lung      Results for orders placed or performed during the hospital encounter of 12/16/22   COVID-19 & Influenza Combo    Specimen: Nasopharyngeal Swab   Result Value Ref Range    SARS-CoV-2 RNA, RT PCR NOT DETECTED NOT DETECTED    INFLUENZA A NOT DETECTED NOT DETECTED    INFLUENZA B NOT DETECTED NOT DETECTED   CBC with Auto Differential   Result Value Ref Range    WBC 7.7 4.0 - 11.0 K/uL    RBC 4.73 4.20 - 5.90 M/uL    Hemoglobin 11.4 (L) 13.5 - 17.5 g/dL    Hematocrit 36.9 (L) 40.5 - 52.5 %    MCV 77.9 (L) 80.0 - 100.0 fL    MCH 24.2 (L) 26.0 - 34.0 pg    MCHC 31.0 31.0 - 36.0 g/dL    RDW 19.9 (H) 12.4 - 15.4 %    Platelets 350 579 - 248 K/uL    MPV 7.7 5.0 - 10.5 fL    Neutrophils % 63.9 %    Lymphocytes % 19.8 %    Monocytes % 14.1 %    Eosinophils % 1.5 %    Basophils % 0.7 %    Neutrophils Absolute 4.9 1.7 - 7.7 K/uL    Lymphocytes Absolute 1.5 1.0 - 5.1 K/uL    Monocytes Absolute 1.1 0.0 - 1.3 K/uL    Eosinophils Absolute 0.1 0.0 - 0.6 K/uL    Basophils Absolute 0.1 0.0 - 0.2 K/uL   CMP w/ Reflex to MG   Result Value Ref Range    Sodium 136 136 - 145 mmol/L    Potassium reflex Magnesium 3.5 3.5 - 5.1 mmol/L    Chloride 99 99 - 110 mmol/L    CO2 22 21 - 32 mmol/L    Anion Gap 15 3 - 16    Glucose 132 (H) 70 - 99 mg/dL    BUN 9 7 - 20 mg/dL    Creatinine 0.8 (L) 0.9 - 1.3 mg/dL    Est, Glom Filt Rate >60 >60    Calcium 8.0 (L) 8.3 - 10.6 mg/dL    Total Protein 7.1 6.4 - 8.2 g/dL    Albumin 3.7 3.4 - 5.0 g/dL    Albumin/Globulin Ratio 1.1 1.1 - 2.2    Total Bilirubin <0.2 0.0 - 1.0 mg/dL    Alkaline Phosphatase 140 (H) 40 - 129 U/L    ALT 23 10 - 40 U/L    AST 25 15 - 37 U/L   Troponin   Result Value Ref Range    Troponin <0.01 <0.01 ng/mL   Lactic Acid   Result Value Ref Range    Lactic Acid 2.1 (H) 0.4 - 2.0 mmol/L   Magnesium   Result Value Ref Range    Magnesium 2.40 1.80 - 2.40 mg/dL   EKG 12 Lead   Result Value Ref Range    Ventricular Rate 118 BPM    Atrial Rate 118 BPM    P-R Interval 118 ms    QRS Duration 90 ms    Q-T Interval 320 ms    QTc Calculation (Bazett) 448 ms    P Axis 24 degrees    R Axis 69 degrees    T Axis -79 degrees    Diagnosis       Sinus tachycardiaPossible Left atrial enlargementT wave abnormality, consider inferolateral ischemiaAbnormal ECGWhen compared with ECG of 25-JUL-2022 16:27,No significant change was found       I estimate there is LOW risk for PULMONARY EMBOLISM, ACUTE CORONARY SYNDROME, OR THORACIC AORTIC DISSECTION, thus I consider the discharge disposition reasonable. Ivy Mcclellan and I have discussed the diagnosis and risks, and we agree with discharging home to follow-up with their primary doctor. We also discussed returning to the Emergency Department immediately if new or worsening symptoms occur. We have discussed the symptoms which are most concerning (e.g., bloody sputum, fever, worsening pain or shortness of breath, vomiting) that necessitate immediate return. FINAL Impression    1. Pneumonia due to infectious organism, unspecified laterality, unspecified part of lung        Blood pressure 133/86, pulse 93, temperature 98.3 °F (36.8 °C), temperature source Oral, resp. rate 23, height 6' (1.829 m), weight 280 lb (127 kg), SpO2 93 %.       DISPOSITION Decision To Discharge 12/17/2022 01:57:09 AM      PATIENT REFERRED TO:  [facility]  ED  [address] [facility] [address]  Go to   If symptoms worsen    [address]  [address]  Schedule an appointment as soon as possible for a visit in 3 days      DISCHARGE MEDICATIONS:  Discharge Medication List as of 12/17/2022  2:06 AM        START taking these medications    Details   doxycycline hyclate (VIBRA-TABS) 100 MG tablet Take 1 tablet by mouth 2 times daily for 7 days, Disp-14 tablet, R-0Normal      albuterol sulfate HFA (VENTOLIN HFA) 108 (90 Base) MCG/ACT inhaler Inhale 2 puffs into the lungs 4 times daily as needed for Wheezing, Disp-18 g, R-0Normal             DISCONTINUED MEDICATIONS:  Discharge Medication List as of 12/17/2022  2:06 AM        STOP taking these medications       hydroCHLOROthiazide (HYDRODIURIL) 25 MG tablet Comments:   Reason for Stopping:                      (Please note the MDM and HPI sections of this note were completed with a voice recognition program.  Efforts were made to edit the dictations but occasionally words are mis-transcribed.)    Electronically signed, Nik Duff,          Nik Duff  12/17/22 7879

## 2022-12-17 NOTE — ED NOTES
Ambulated pt up in hallway, approx 50 ft. Initial p.ox 96% on RA, . Upon return to room p.ox 93%, . Pt verbalized chest pain \"flared up\" during walk, but continued, stating, \"I have constant chest pain\".       Rebecka Portela Naegele  12/16/22 2856

## 2022-12-17 NOTE — ED NOTES
Ambulated PT  Resting:  HR: 98  SPO2: 93    Walking:  HR: 116  SPO2:94     Lindsey Grossman  12/17/22 0059

## 2022-12-22 NOTE — ED PROVIDER NOTES
Attending Supervisory Note/Shared Visit   I have personally performed a face to face diagnostic evaluation on this patient. I have reviewed the mid-levels findings and agree. History and Exam by me shows no acute distress. The ED evaluation of cough and shortness of breath. He reported anterior chest wall discomfort when coughing. Denies a history of cardiopulmonary disease. He is a current smoker. She does work around other people have been sick with similar symptoms. States is not vaccinated against COVID. On exam patient resting comfortably. His vital signs are within normal limits. Patient noted to be tachycardic initially on arrival.  Patient's lungs with coarse breath sounds with good air movement. He does have minimal wheezing. Abdomen soft nontender distended. Patient initially seen by advanced practice provider. Laboratory work-up was unremarkable. Lactate slightly elevated. Chest obtained demonstrates likely bronchopneumonia. Patient started on broad-spectrum antibiotics. She reports feeling improved symptomatic treatment emergency department. He is declining admission to the hospital.  Strict return precautions discussed with patient. I agree with the DORA plan of care. Please DORA Note for full ED course and final disposition. EKG demonstrates a sinus rhythm ventricular rate of 118 bpm.  RI interval and QTc interval within normal limits. Patient has a normal axis. There are no significant ST elevations or depressions EKG is nondiagnostic for ACS. [place] [facility]an Compared EKG from 7/25/2022 did not appreciate significant change      Disposition:  1.  Pneumonia due to infectious organism, unspecified laterality, unspecified part of lung          Bryant Sheldon MD  Attending Emergency Physician        Bryant Sheldon MD  12/21/22 0281

## 2022-12-29 ENCOUNTER — TELEPHONE (OUTPATIENT)
Dept: CASE MANAGEMENT | Age: 27
End: 2022-12-29

## 2022-12-29 NOTE — TELEPHONE ENCOUNTER
Imaging report CT Chest 12/16/22 with f/u imaging recommendations sent to Dur Garcia MD    [address] [place] [address] GEORGE(CHINTAN)

## 2024-02-26 ENCOUNTER — APPOINTMENT (OUTPATIENT)
Dept: GENERAL RADIOLOGY | Age: 29
DRG: 189 | End: 2024-02-26
Payer: COMMERCIAL

## 2024-02-26 ENCOUNTER — HOSPITAL ENCOUNTER (INPATIENT)
Age: 29
LOS: 8 days | Discharge: HOME | DRG: 189 | End: 2024-03-05
Attending: STUDENT IN AN ORGANIZED HEALTH CARE EDUCATION/TRAINING PROGRAM
Payer: COMMERCIAL

## 2024-02-26 ENCOUNTER — APPOINTMENT (OUTPATIENT)
Dept: CT IMAGING | Age: 29
DRG: 189 | End: 2024-02-26
Payer: COMMERCIAL

## 2024-02-26 DIAGNOSIS — J18.9 PNEUMONIA OF RIGHT LUNG DUE TO INFECTIOUS ORGANISM, UNSPECIFIED PART OF LUNG: ICD-10-CM

## 2024-02-26 DIAGNOSIS — F10.930 ALCOHOL WITHDRAWAL SYNDROME WITHOUT COMPLICATION (HCC): Primary | ICD-10-CM

## 2024-02-26 DIAGNOSIS — R09.02 HYPOXIA: ICD-10-CM

## 2024-02-26 PROBLEM — I16.1 HYPERTENSIVE EMERGENCY: Status: ACTIVE | Noted: 2024-02-26

## 2024-02-26 PROBLEM — F10.90 ALCOHOL USE: Status: ACTIVE | Noted: 2024-02-26

## 2024-02-26 PROBLEM — F12.90 MARIJUANA USE: Status: ACTIVE | Noted: 2024-02-26

## 2024-02-26 PROBLEM — E66.01 MORBID OBESITY WITH BMI OF 60.0-69.9, ADULT (HCC): Status: ACTIVE | Noted: 2024-02-26

## 2024-02-26 PROBLEM — J15.9 COMMUNITY ACQUIRED BACTERIAL PNEUMONIA: Status: ACTIVE | Noted: 2024-02-26

## 2024-02-26 PROBLEM — Z72.0 VAPES NICOTINE CONTAINING SUBSTANCE: Status: ACTIVE | Noted: 2024-02-26

## 2024-02-26 PROBLEM — R09.89 PULMONARY VENOUS CONGESTION: Status: ACTIVE | Noted: 2024-02-26

## 2024-02-26 PROBLEM — G47.33 OSA (OBSTRUCTIVE SLEEP APNEA): Status: ACTIVE | Noted: 2024-02-26

## 2024-02-26 PROBLEM — Z78.9 ALCOHOL USE: Status: ACTIVE | Noted: 2024-02-26

## 2024-02-26 PROBLEM — J96.02 ACUTE RESPIRATORY FAILURE WITH HYPERCAPNIA (HCC): Status: ACTIVE | Noted: 2024-02-26

## 2024-02-26 LAB
ALBUMIN SERPL-MCNC: 3.6 G/DL (ref 3.4–5)
ALBUMIN/GLOB SERPL: 1.1 {RATIO} (ref 1.1–2.2)
ALP SERPL-CCNC: 196 U/L (ref 40–129)
ALT SERPL-CCNC: 41 U/L (ref 10–40)
ANION GAP SERPL CALCULATED.3IONS-SCNC: 10 MMOL/L (ref 3–16)
APAP SERPL-MCNC: <5 UG/ML (ref 10–30)
AST SERPL-CCNC: 59 U/L (ref 15–37)
BASE EXCESS BLDA CALC-SCNC: 1.9 MMOL/L (ref -3–3)
BASE EXCESS BLDV CALC-SCNC: -0.3 MMOL/L (ref -3–3)
BASE EXCESS BLDV CALC-SCNC: -0.3 MMOL/L (ref -3–3)
BASE EXCESS BLDV CALC-SCNC: -3.3 MMOL/L (ref -3–3)
BASOPHILS # BLD: 0 K/UL (ref 0–0.2)
BASOPHILS NFR BLD: 0.9 %
BILIRUB SERPL-MCNC: <0.2 MG/DL (ref 0–1)
BILIRUB UR QL STRIP.AUTO: NEGATIVE
BUN SERPL-MCNC: 5 MG/DL (ref 7–20)
CALCIUM SERPL-MCNC: 7.7 MG/DL (ref 8.3–10.6)
CHLORIDE SERPL-SCNC: 98 MMOL/L (ref 99–110)
CLARITY UR: CLEAR
CO2 BLDA-SCNC: 31.9 MMOL/L
CO2 BLDV-SCNC: 26 MMOL/L
CO2 BLDV-SCNC: 26 MMOL/L
CO2 BLDV-SCNC: 29 MMOL/L
CO2 SERPL-SCNC: 26 MMOL/L (ref 21–32)
COHGB MFR BLDA: 0.6 % (ref 0–1.5)
COHGB MFR BLDV: 2.5 % (ref 0–1.5)
COHGB MFR BLDV: 2.7 % (ref 0–1.5)
COHGB MFR BLDV: 2.8 % (ref 0–1.5)
COLOR UR: NORMAL
CREAT SERPL-MCNC: <0.5 MG/DL (ref 0.9–1.3)
D DIMER: 0.48 UG/ML FEU (ref 0–0.6)
DEPRECATED RDW RBC AUTO: 17.4 % (ref 12.4–15.4)
EKG ATRIAL RATE: 93 BPM
EKG ATRIAL RATE: 98 BPM
EKG DIAGNOSIS: NORMAL
EKG DIAGNOSIS: NORMAL
EKG P AXIS: 46 DEGREES
EKG P AXIS: 56 DEGREES
EKG P-R INTERVAL: 132 MS
EKG P-R INTERVAL: 134 MS
EKG Q-T INTERVAL: 354 MS
EKG Q-T INTERVAL: 376 MS
EKG QRS DURATION: 86 MS
EKG QRS DURATION: 88 MS
EKG QTC CALCULATION (BAZETT): 451 MS
EKG QTC CALCULATION (BAZETT): 467 MS
EKG R AXIS: 53 DEGREES
EKG R AXIS: 57 DEGREES
EKG T AXIS: 76 DEGREES
EKG T AXIS: 91 DEGREES
EKG VENTRICULAR RATE: 93 BPM
EKG VENTRICULAR RATE: 98 BPM
EOSINOPHIL # BLD: 0.1 K/UL (ref 0–0.6)
EOSINOPHIL NFR BLD: 1.7 %
ETHANOLAMINE SERPL-MCNC: NORMAL MG/DL (ref 0–0.08)
GFR SERPLBLD CREATININE-BSD FMLA CKD-EPI: >60 ML/MIN/{1.73_M2}
GLUCOSE SERPL-MCNC: 107 MG/DL (ref 70–99)
GLUCOSE UR STRIP.AUTO-MCNC: NEGATIVE MG/DL
HCO3 BLDA-SCNC: 30 MMOL/L (ref 21–29)
HCO3 BLDV-SCNC: 24.1 MMOL/L (ref 23–29)
HCO3 BLDV-SCNC: 24.4 MMOL/L (ref 23–29)
HCO3 BLDV-SCNC: 27.3 MMOL/L (ref 23–29)
HCT VFR BLD AUTO: 38.5 % (ref 40.5–52.5)
HGB BLD-MCNC: 12.3 G/DL (ref 13.5–17.5)
HGB BLDA-MCNC: 14.1 G/DL (ref 13.5–17.5)
HGB UR QL STRIP.AUTO: NEGATIVE
KETONES UR STRIP.AUTO-MCNC: NEGATIVE MG/DL
LACTATE BLDV-SCNC: 1.1 MMOL/L (ref 0.4–1.9)
LACTATE BLDV-SCNC: 1.1 MMOL/L (ref 0.4–1.9)
LEUKOCYTE ESTERASE UR QL STRIP.AUTO: NEGATIVE
LYMPHOCYTES # BLD: 1.1 K/UL (ref 1–5.1)
LYMPHOCYTES NFR BLD: 20.9 %
MCH RBC QN AUTO: 29.3 PG (ref 26–34)
MCHC RBC AUTO-ENTMCNC: 31.9 G/DL (ref 31–36)
MCV RBC AUTO: 91.7 FL (ref 80–100)
METHGB MFR BLDA: 0.2 %
METHGB MFR BLDV: 0.1 %
METHGB MFR BLDV: 0.3 %
METHGB MFR BLDV: 0.3 %
MONOCYTES # BLD: 0.7 K/UL (ref 0–1.3)
MONOCYTES NFR BLD: 12.4 %
NEUTROPHILS # BLD: 3.4 K/UL (ref 1.7–7.7)
NEUTROPHILS NFR BLD: 64.1 %
NITRITE UR QL STRIP.AUTO: NEGATIVE
O2 THERAPY: ABNORMAL
PCO2 BLDA: 62 MMHG (ref 35–45)
PCO2 BLDV: 40.1 MMHG (ref 40–50)
PCO2 BLDV: 52.7 MMHG (ref 40–50)
PCO2 BLDV: 57.2 MMHG (ref 40–50)
PH BLDA: 7.3 [PH] (ref 7.35–7.45)
PH BLDV: 7.28 [PH] (ref 7.35–7.45)
PH BLDV: 7.3 [PH] (ref 7.35–7.45)
PH BLDV: 7.4 [PH] (ref 7.35–7.45)
PH UR STRIP.AUTO: 6 [PH] (ref 5–8)
PLATELET # BLD AUTO: 254 K/UL (ref 135–450)
PMV BLD AUTO: 7.8 FL (ref 5–10.5)
PO2 BLDA: 70.5 MMHG (ref 75–108)
PO2 BLDV: 129.9 MMHG (ref 25–40)
PO2 BLDV: 72.3 MMHG (ref 25–40)
PO2 BLDV: 83.4 MMHG (ref 25–40)
POTASSIUM SERPL-SCNC: 4.1 MMOL/L (ref 3.5–5.1)
PROT SERPL-MCNC: 6.8 G/DL (ref 6.4–8.2)
PROT UR STRIP.AUTO-MCNC: NEGATIVE MG/DL
RBC # BLD AUTO: 4.19 M/UL (ref 4.2–5.9)
SALICYLATES SERPL-MCNC: 0.6 MG/DL (ref 15–30)
SAO2 % BLDA: 93.1 %
SAO2 % BLDV: 93 %
SAO2 % BLDV: 96 %
SAO2 % BLDV: 99 %
SODIUM SERPL-SCNC: 134 MMOL/L (ref 136–145)
SP GR UR STRIP.AUTO: 1.01 (ref 1–1.03)
TROPONIN, HIGH SENSITIVITY: 19 NG/L (ref 0–22)
TROPONIN, HIGH SENSITIVITY: 20 NG/L (ref 0–22)
TROPONIN, HIGH SENSITIVITY: 21 NG/L (ref 0–22)
UA DIPSTICK W REFLEX MICRO PNL UR: NORMAL
URN SPEC COLLECT METH UR: NORMAL
UROBILINOGEN UR STRIP-ACNC: 0.2 E.U./DL
WBC # BLD AUTO: 5.3 K/UL (ref 4–11)

## 2024-02-26 PROCEDURE — 83605 ASSAY OF LACTIC ACID: CPT

## 2024-02-26 PROCEDURE — 2500000003 HC RX 250 WO HCPCS: Performed by: INTERNAL MEDICINE

## 2024-02-26 PROCEDURE — 87040 BLOOD CULTURE FOR BACTERIA: CPT

## 2024-02-26 PROCEDURE — 84484 ASSAY OF TROPONIN QUANT: CPT

## 2024-02-26 PROCEDURE — 6370000000 HC RX 637 (ALT 250 FOR IP): Performed by: PHYSICIAN ASSISTANT

## 2024-02-26 PROCEDURE — 80143 DRUG ASSAY ACETAMINOPHEN: CPT

## 2024-02-26 PROCEDURE — 80053 COMPREHEN METABOLIC PANEL: CPT

## 2024-02-26 PROCEDURE — 2500000003 HC RX 250 WO HCPCS: Performed by: PHYSICIAN ASSISTANT

## 2024-02-26 PROCEDURE — 6370000000 HC RX 637 (ALT 250 FOR IP): Performed by: STUDENT IN AN ORGANIZED HEALTH CARE EDUCATION/TRAINING PROGRAM

## 2024-02-26 PROCEDURE — 6360000002 HC RX W HCPCS: Performed by: PHYSICIAN ASSISTANT

## 2024-02-26 PROCEDURE — 93005 ELECTROCARDIOGRAM TRACING: CPT | Performed by: PHYSICIAN ASSISTANT

## 2024-02-26 PROCEDURE — 2000000000 HC ICU R&B

## 2024-02-26 PROCEDURE — 93010 ELECTROCARDIOGRAM REPORT: CPT | Performed by: INTERNAL MEDICINE

## 2024-02-26 PROCEDURE — 94660 CPAP INITIATION&MGMT: CPT

## 2024-02-26 PROCEDURE — 6360000002 HC RX W HCPCS: Performed by: INTERNAL MEDICINE

## 2024-02-26 PROCEDURE — 96366 THER/PROPH/DIAG IV INF ADDON: CPT

## 2024-02-26 PROCEDURE — 6360000002 HC RX W HCPCS: Performed by: STUDENT IN AN ORGANIZED HEALTH CARE EDUCATION/TRAINING PROGRAM

## 2024-02-26 PROCEDURE — 71046 X-RAY EXAM CHEST 2 VIEWS: CPT

## 2024-02-26 PROCEDURE — 5A09457 ASSISTANCE WITH RESPIRATORY VENTILATION, 24-96 CONSECUTIVE HOURS, CONTINUOUS POSITIVE AIRWAY PRESSURE: ICD-10-PCS | Performed by: STUDENT IN AN ORGANIZED HEALTH CARE EDUCATION/TRAINING PROGRAM

## 2024-02-26 PROCEDURE — 81003 URINALYSIS AUTO W/O SCOPE: CPT

## 2024-02-26 PROCEDURE — 94761 N-INVAS EAR/PLS OXIMETRY MLT: CPT

## 2024-02-26 PROCEDURE — 99291 CRITICAL CARE FIRST HOUR: CPT | Performed by: INTERNAL MEDICINE

## 2024-02-26 PROCEDURE — 2580000003 HC RX 258: Performed by: PHYSICIAN ASSISTANT

## 2024-02-26 PROCEDURE — 87086 URINE CULTURE/COLONY COUNT: CPT

## 2024-02-26 PROCEDURE — 82803 BLOOD GASES ANY COMBINATION: CPT

## 2024-02-26 PROCEDURE — 93005 ELECTROCARDIOGRAM TRACING: CPT | Performed by: STUDENT IN AN ORGANIZED HEALTH CARE EDUCATION/TRAINING PROGRAM

## 2024-02-26 PROCEDURE — 2580000003 HC RX 258: Performed by: STUDENT IN AN ORGANIZED HEALTH CARE EDUCATION/TRAINING PROGRAM

## 2024-02-26 PROCEDURE — 71045 X-RAY EXAM CHEST 1 VIEW: CPT

## 2024-02-26 PROCEDURE — 2700000000 HC OXYGEN THERAPY PER DAY

## 2024-02-26 PROCEDURE — C9113 INJ PANTOPRAZOLE SODIUM, VIA: HCPCS | Performed by: INTERNAL MEDICINE

## 2024-02-26 PROCEDURE — 80179 DRUG ASSAY SALICYLATE: CPT

## 2024-02-26 PROCEDURE — 85025 COMPLETE CBC W/AUTO DIFF WBC: CPT

## 2024-02-26 PROCEDURE — 99285 EMERGENCY DEPT VISIT HI MDM: CPT

## 2024-02-26 PROCEDURE — 51702 INSERT TEMP BLADDER CATH: CPT

## 2024-02-26 PROCEDURE — 36415 COLL VENOUS BLD VENIPUNCTURE: CPT

## 2024-02-26 PROCEDURE — 82077 ASSAY SPEC XCP UR&BREATH IA: CPT

## 2024-02-26 PROCEDURE — 96375 TX/PRO/DX INJ NEW DRUG ADDON: CPT

## 2024-02-26 PROCEDURE — 96365 THER/PROPH/DIAG IV INF INIT: CPT

## 2024-02-26 PROCEDURE — 6360000004 HC RX CONTRAST MEDICATION: Performed by: PHYSICIAN ASSISTANT

## 2024-02-26 PROCEDURE — 5A09457 ASSISTANCE WITH RESPIRATORY VENTILATION, 24-96 CONSECUTIVE HOURS, CONTINUOUS POSITIVE AIRWAY PRESSURE: ICD-10-PCS

## 2024-02-26 PROCEDURE — 74177 CT ABD & PELVIS W/CONTRAST: CPT

## 2024-02-26 PROCEDURE — 85379 FIBRIN DEGRADATION QUANT: CPT

## 2024-02-26 RX ORDER — SODIUM CHLORIDE, SODIUM LACTATE, POTASSIUM CHLORIDE, CALCIUM CHLORIDE 600; 310; 30; 20 MG/100ML; MG/100ML; MG/100ML; MG/100ML
INJECTION, SOLUTION INTRAVENOUS CONTINUOUS
Status: DISCONTINUED | OUTPATIENT
Start: 2024-02-26 | End: 2024-02-26

## 2024-02-26 RX ORDER — SODIUM CHLORIDE 0.9 % (FLUSH) 0.9 %
5-40 SYRINGE (ML) INJECTION EVERY 12 HOURS SCHEDULED
Status: DISCONTINUED | OUTPATIENT
Start: 2024-02-26 | End: 2024-02-26 | Stop reason: SDUPTHER

## 2024-02-26 RX ORDER — ASPIRIN 325 MG
325 TABLET ORAL ONCE
Status: COMPLETED | OUTPATIENT
Start: 2024-02-26 | End: 2024-02-26

## 2024-02-26 RX ORDER — SODIUM CHLORIDE 9 MG/ML
INJECTION, SOLUTION INTRAVENOUS PRN
Status: DISCONTINUED | OUTPATIENT
Start: 2024-02-26 | End: 2024-02-26 | Stop reason: SDUPTHER

## 2024-02-26 RX ORDER — THIAMINE HYDROCHLORIDE 100 MG/ML
100 INJECTION, SOLUTION INTRAMUSCULAR; INTRAVENOUS DAILY
Status: DISCONTINUED | OUTPATIENT
Start: 2024-02-26 | End: 2024-02-28

## 2024-02-26 RX ORDER — LORAZEPAM 2 MG/ML
3 INJECTION INTRAMUSCULAR
Status: DISCONTINUED | OUTPATIENT
Start: 2024-02-26 | End: 2024-02-26

## 2024-02-26 RX ORDER — SODIUM CHLORIDE 0.9 % (FLUSH) 0.9 %
5-40 SYRINGE (ML) INJECTION PRN
Status: DISCONTINUED | OUTPATIENT
Start: 2024-02-26 | End: 2024-02-26 | Stop reason: SDUPTHER

## 2024-02-26 RX ORDER — NICOTINE 21 MG/24HR
1 PATCH, TRANSDERMAL 24 HOURS TRANSDERMAL DAILY
Status: DISCONTINUED | OUTPATIENT
Start: 2024-02-26 | End: 2024-03-05 | Stop reason: HOSPADM

## 2024-02-26 RX ORDER — SODIUM CHLORIDE 0.9 % (FLUSH) 0.9 %
5-40 SYRINGE (ML) INJECTION PRN
Status: DISCONTINUED | OUTPATIENT
Start: 2024-02-26 | End: 2024-03-05 | Stop reason: HOSPADM

## 2024-02-26 RX ORDER — LORAZEPAM 2 MG/ML
4 INJECTION INTRAMUSCULAR
Status: DISCONTINUED | OUTPATIENT
Start: 2024-02-26 | End: 2024-03-05 | Stop reason: HOSPADM

## 2024-02-26 RX ORDER — SODIUM CHLORIDE 0.9 % (FLUSH) 0.9 %
5-40 SYRINGE (ML) INJECTION EVERY 12 HOURS SCHEDULED
Status: DISCONTINUED | OUTPATIENT
Start: 2024-02-26 | End: 2024-03-05 | Stop reason: HOSPADM

## 2024-02-26 RX ORDER — PANTOPRAZOLE SODIUM 40 MG/10ML
40 INJECTION, POWDER, LYOPHILIZED, FOR SOLUTION INTRAVENOUS DAILY
Status: DISCONTINUED | OUTPATIENT
Start: 2024-02-26 | End: 2024-02-28

## 2024-02-26 RX ORDER — POTASSIUM CHLORIDE 7.45 MG/ML
10 INJECTION INTRAVENOUS PRN
Status: DISCONTINUED | OUTPATIENT
Start: 2024-02-26 | End: 2024-03-05 | Stop reason: HOSPADM

## 2024-02-26 RX ORDER — LORAZEPAM 1 MG/1
1 TABLET ORAL
Status: DISCONTINUED | OUTPATIENT
Start: 2024-02-26 | End: 2024-03-05 | Stop reason: HOSPADM

## 2024-02-26 RX ORDER — POTASSIUM CHLORIDE 20 MEQ/1
40 TABLET, EXTENDED RELEASE ORAL PRN
Status: DISCONTINUED | OUTPATIENT
Start: 2024-02-26 | End: 2024-03-05 | Stop reason: HOSPADM

## 2024-02-26 RX ORDER — PANTOPRAZOLE SODIUM 40 MG/1
40 TABLET, DELAYED RELEASE ORAL
Status: DISCONTINUED | OUTPATIENT
Start: 2024-02-27 | End: 2024-02-26

## 2024-02-26 RX ORDER — LORAZEPAM 2 MG/ML
1 INJECTION INTRAMUSCULAR
Status: DISCONTINUED | OUTPATIENT
Start: 2024-02-26 | End: 2024-03-05 | Stop reason: HOSPADM

## 2024-02-26 RX ORDER — IPRATROPIUM BROMIDE AND ALBUTEROL SULFATE 2.5; .5 MG/3ML; MG/3ML
1 SOLUTION RESPIRATORY (INHALATION) ONCE
Status: COMPLETED | OUTPATIENT
Start: 2024-02-26 | End: 2024-02-26

## 2024-02-26 RX ORDER — FUROSEMIDE 10 MG/ML
40 INJECTION INTRAMUSCULAR; INTRAVENOUS 2 TIMES DAILY
Status: DISCONTINUED | OUTPATIENT
Start: 2024-02-26 | End: 2024-02-28

## 2024-02-26 RX ORDER — LORAZEPAM 1 MG/1
3 TABLET ORAL
Status: DISCONTINUED | OUTPATIENT
Start: 2024-02-26 | End: 2024-02-26

## 2024-02-26 RX ORDER — LISINOPRIL 20 MG/1
20 TABLET ORAL DAILY
Status: DISCONTINUED | OUTPATIENT
Start: 2024-02-26 | End: 2024-02-27

## 2024-02-26 RX ORDER — LORAZEPAM 2 MG/ML
2 INJECTION INTRAMUSCULAR
Status: DISCONTINUED | OUTPATIENT
Start: 2024-02-26 | End: 2024-03-05 | Stop reason: HOSPADM

## 2024-02-26 RX ORDER — LORAZEPAM 1 MG/1
1 TABLET ORAL
Status: DISCONTINUED | OUTPATIENT
Start: 2024-02-26 | End: 2024-02-26

## 2024-02-26 RX ORDER — LORAZEPAM 1 MG/1
2 TABLET ORAL
Status: DISCONTINUED | OUTPATIENT
Start: 2024-02-26 | End: 2024-03-05 | Stop reason: HOSPADM

## 2024-02-26 RX ORDER — SODIUM CHLORIDE 0.9 % (FLUSH) 0.9 %
5-40 SYRINGE (ML) INJECTION PRN
Status: DISCONTINUED | OUTPATIENT
Start: 2024-02-26 | End: 2024-02-27

## 2024-02-26 RX ORDER — LORAZEPAM 2 MG/ML
4 INJECTION INTRAMUSCULAR
Status: DISCONTINUED | OUTPATIENT
Start: 2024-02-26 | End: 2024-02-26

## 2024-02-26 RX ORDER — ONDANSETRON 4 MG/1
4 TABLET, ORALLY DISINTEGRATING ORAL EVERY 8 HOURS PRN
Status: DISCONTINUED | OUTPATIENT
Start: 2024-02-26 | End: 2024-03-05 | Stop reason: HOSPADM

## 2024-02-26 RX ORDER — ACETAMINOPHEN 325 MG/1
650 TABLET ORAL EVERY 6 HOURS PRN
Status: DISCONTINUED | OUTPATIENT
Start: 2024-02-26 | End: 2024-03-05 | Stop reason: HOSPADM

## 2024-02-26 RX ORDER — ENOXAPARIN SODIUM 100 MG/ML
60 INJECTION SUBCUTANEOUS 2 TIMES DAILY
Status: DISCONTINUED | OUTPATIENT
Start: 2024-02-26 | End: 2024-02-26

## 2024-02-26 RX ORDER — SODIUM CHLORIDE 0.9 % (FLUSH) 0.9 %
5-40 SYRINGE (ML) INJECTION EVERY 12 HOURS SCHEDULED
Status: DISCONTINUED | OUTPATIENT
Start: 2024-02-26 | End: 2024-02-27

## 2024-02-26 RX ORDER — PHENOBARBITAL SODIUM 65 MG/ML
65 INJECTION, SOLUTION INTRAMUSCULAR; INTRAVENOUS 2 TIMES DAILY
Status: DISCONTINUED | OUTPATIENT
Start: 2024-02-27 | End: 2024-02-28

## 2024-02-26 RX ORDER — LORAZEPAM 1 MG/1
4 TABLET ORAL
Status: DISCONTINUED | OUTPATIENT
Start: 2024-02-26 | End: 2024-02-26

## 2024-02-26 RX ORDER — AMLODIPINE BESYLATE 5 MG/1
10 TABLET ORAL DAILY
Status: DISCONTINUED | OUTPATIENT
Start: 2024-02-26 | End: 2024-02-27

## 2024-02-26 RX ORDER — HEPARIN SODIUM 5000 [USP'U]/ML
5000 INJECTION, SOLUTION INTRAVENOUS; SUBCUTANEOUS EVERY 8 HOURS SCHEDULED
Status: DISCONTINUED | OUTPATIENT
Start: 2024-02-26 | End: 2024-02-27

## 2024-02-26 RX ORDER — NICARDIPINE HYDROCHLORIDE 0.1 MG/ML
2.5-15 INJECTION INTRAVENOUS CONTINUOUS
Status: DISCONTINUED | OUTPATIENT
Start: 2024-02-26 | End: 2024-02-26 | Stop reason: SDUPTHER

## 2024-02-26 RX ORDER — DEXMEDETOMIDINE HYDROCHLORIDE 4 UG/ML
.1-1.5 INJECTION, SOLUTION INTRAVENOUS CONTINUOUS
Status: DISCONTINUED | OUTPATIENT
Start: 2024-02-26 | End: 2024-02-27

## 2024-02-26 RX ORDER — LORAZEPAM 2 MG/ML
1 INJECTION INTRAMUSCULAR
Status: DISCONTINUED | OUTPATIENT
Start: 2024-02-26 | End: 2024-02-26

## 2024-02-26 RX ORDER — LORAZEPAM 1 MG/1
3 TABLET ORAL
Status: DISCONTINUED | OUTPATIENT
Start: 2024-02-26 | End: 2024-03-05 | Stop reason: HOSPADM

## 2024-02-26 RX ORDER — LORAZEPAM 1 MG/1
4 TABLET ORAL
Status: DISCONTINUED | OUTPATIENT
Start: 2024-02-26 | End: 2024-03-05 | Stop reason: HOSPADM

## 2024-02-26 RX ORDER — ACETAMINOPHEN 650 MG/1
650 SUPPOSITORY RECTAL EVERY 6 HOURS PRN
Status: DISCONTINUED | OUTPATIENT
Start: 2024-02-26 | End: 2024-03-05 | Stop reason: HOSPADM

## 2024-02-26 RX ORDER — POLYETHYLENE GLYCOL 3350 17 G/17G
17 POWDER, FOR SOLUTION ORAL DAILY PRN
Status: DISCONTINUED | OUTPATIENT
Start: 2024-02-26 | End: 2024-03-05 | Stop reason: HOSPADM

## 2024-02-26 RX ORDER — SODIUM CHLORIDE 9 MG/ML
INJECTION, SOLUTION INTRAVENOUS PRN
Status: DISCONTINUED | OUTPATIENT
Start: 2024-02-26 | End: 2024-03-05 | Stop reason: HOSPADM

## 2024-02-26 RX ORDER — MAGNESIUM SULFATE IN WATER 40 MG/ML
2000 INJECTION, SOLUTION INTRAVENOUS PRN
Status: DISCONTINUED | OUTPATIENT
Start: 2024-02-26 | End: 2024-03-05 | Stop reason: HOSPADM

## 2024-02-26 RX ORDER — LORAZEPAM 2 MG/ML
3 INJECTION INTRAMUSCULAR
Status: DISCONTINUED | OUTPATIENT
Start: 2024-02-26 | End: 2024-03-05 | Stop reason: HOSPADM

## 2024-02-26 RX ORDER — LORAZEPAM 2 MG/ML
1 INJECTION INTRAMUSCULAR ONCE
Status: COMPLETED | OUTPATIENT
Start: 2024-02-26 | End: 2024-02-26

## 2024-02-26 RX ORDER — LANOLIN ALCOHOL/MO/W.PET/CERES
100 CREAM (GRAM) TOPICAL DAILY
Status: DISCONTINUED | OUTPATIENT
Start: 2024-02-26 | End: 2024-03-05 | Stop reason: HOSPADM

## 2024-02-26 RX ORDER — LORAZEPAM 2 MG/ML
2 INJECTION INTRAMUSCULAR
Status: DISCONTINUED | OUTPATIENT
Start: 2024-02-26 | End: 2024-02-26

## 2024-02-26 RX ORDER — ONDANSETRON 2 MG/ML
4 INJECTION INTRAMUSCULAR; INTRAVENOUS EVERY 6 HOURS PRN
Status: DISCONTINUED | OUTPATIENT
Start: 2024-02-26 | End: 2024-03-05 | Stop reason: HOSPADM

## 2024-02-26 RX ORDER — PHENOBARBITAL SODIUM 65 MG/ML
130 INJECTION, SOLUTION INTRAMUSCULAR; INTRAVENOUS
Status: DISCONTINUED | OUTPATIENT
Start: 2024-02-26 | End: 2024-02-29

## 2024-02-26 RX ORDER — LORAZEPAM 1 MG/1
2 TABLET ORAL
Status: DISCONTINUED | OUTPATIENT
Start: 2024-02-26 | End: 2024-02-26

## 2024-02-26 RX ADMIN — THIAMINE HYDROCHLORIDE 100 MG: 100 INJECTION, SOLUTION INTRAMUSCULAR; INTRAVENOUS at 20:22

## 2024-02-26 RX ADMIN — SODIUM CHLORIDE 5 MG/HR: 9 INJECTION, SOLUTION INTRAVENOUS at 21:31

## 2024-02-26 RX ADMIN — LORAZEPAM 1 MG: 1 TABLET ORAL at 12:29

## 2024-02-26 RX ADMIN — Medication 0.2 MCG/KG/HR: at 20:30

## 2024-02-26 RX ADMIN — FUROSEMIDE 40 MG: 10 INJECTION, SOLUTION INTRAMUSCULAR; INTRAVENOUS at 20:22

## 2024-02-26 RX ADMIN — LORAZEPAM 1 MG: 2 INJECTION, SOLUTION INTRAMUSCULAR; INTRAVENOUS at 14:52

## 2024-02-26 RX ADMIN — ACETAMINOPHEN 325MG 650 MG: 325 TABLET ORAL at 22:42

## 2024-02-26 RX ADMIN — ASPIRIN 325 MG: 325 TABLET ORAL at 11:27

## 2024-02-26 RX ADMIN — Medication 100 MG: at 20:23

## 2024-02-26 RX ADMIN — Medication 10 ML: at 22:17

## 2024-02-26 RX ADMIN — SODIUM CHLORIDE, POTASSIUM CHLORIDE, SODIUM LACTATE AND CALCIUM CHLORIDE: 600; 310; 30; 20 INJECTION, SOLUTION INTRAVENOUS at 19:19

## 2024-02-26 RX ADMIN — PANTOPRAZOLE SODIUM 40 MG: 40 INJECTION, POWDER, FOR SOLUTION INTRAVENOUS at 20:22

## 2024-02-26 RX ADMIN — LORAZEPAM 1 MG: 1 TABLET ORAL at 11:27

## 2024-02-26 RX ADMIN — HEPARIN SODIUM 5000 UNITS: 5000 INJECTION INTRAVENOUS; SUBCUTANEOUS at 22:44

## 2024-02-26 RX ADMIN — LORAZEPAM 1 MG: 2 INJECTION INTRAMUSCULAR; INTRAVENOUS at 22:42

## 2024-02-26 RX ADMIN — FOLIC ACID: 5 INJECTION, SOLUTION INTRAMUSCULAR; INTRAVENOUS; SUBCUTANEOUS at 11:45

## 2024-02-26 RX ADMIN — IOPAMIDOL 75 ML: 755 INJECTION, SOLUTION INTRAVENOUS at 12:54

## 2024-02-26 RX ADMIN — IPRATROPIUM BROMIDE AND ALBUTEROL SULFATE 1 DOSE: 2.5; .5 SOLUTION RESPIRATORY (INHALATION) at 14:10

## 2024-02-26 RX ADMIN — CEFTRIAXONE SODIUM 1000 MG: 1 INJECTION, POWDER, FOR SOLUTION INTRAMUSCULAR; INTRAVENOUS at 17:39

## 2024-02-26 ASSESSMENT — PAIN DESCRIPTION - LOCATION
LOCATION: CHEST
LOCATION: HEAD
LOCATION: CHEST
LOCATION: HEAD

## 2024-02-26 ASSESSMENT — PAIN DESCRIPTION - DESCRIPTORS
DESCRIPTORS: POUNDING
DESCRIPTORS: POUNDING
DESCRIPTORS: TIGHTNESS
DESCRIPTORS: TIGHTNESS

## 2024-02-26 ASSESSMENT — PAIN SCALES - GENERAL
PAINLEVEL_OUTOF10: 2
PAINLEVEL_OUTOF10: 9
PAINLEVEL_OUTOF10: 5
PAINLEVEL_OUTOF10: 5

## 2024-02-26 ASSESSMENT — PAIN - FUNCTIONAL ASSESSMENT: PAIN_FUNCTIONAL_ASSESSMENT: 0-10

## 2024-02-26 NOTE — PLAN OF CARE
Change of plans and management:    1) discussed with RN at bedside in detail.  Will reduce transaminitis.  Blood pressure is getting elevated patient is morbidly obese with undiagnosed sleep apnea high risk for apneic spells with hypercapnia.  Withdrawing actively.  To be transferred to ICU.    2) because of hypertensive urgency in the setting of active withdrawal start the patient on nicardipine drip with a goal SBP in the past 24 hours of 140-160    3) keeping in mind the high risk for aspirations and apneic spells patient still needs CIWA protocol with either Ativan and phenobarb.  After extensive discussion ultimately decision is being made to transfer the patient to ICU and start the patient on protocol for phenobarbital to be used instead of Ativan for alcohol withdrawal management    4) patient showed understanding about the difference between the use of phenobarbital and Ativan because of the central hypoventilation risk from Ativan.  Very difficult choice at the moment but the patient was made mutually after talking to the patient    5) mechanical judgment suggest to be to use phenobarbital IV now.  If unable to do so may consider using Precedex.  Will communicate that with the night team to keep an eye.  Will communicate this with DORA of night

## 2024-02-26 NOTE — ED NOTES
This RN entered pt to reassess and pt was diaphoretic and stated he felt warm. Dr Bergman called to pt bedside.

## 2024-02-26 NOTE — ED PROVIDER NOTES
I independently reviewed the ECG as follows:    Sinus tachycardia rate of 103 without ectopy.  Normal axis and intervals.  No evidence of acute ischemia.  Overall morphology unchanged from prior dated December 16, 2022    Please reference my attending note if I had further involvement in the care of this patient otherwise I did not participate in the care of this patient beyond evaluation of this ECG.  Please reference the DORA's documentation for further details.        Seamus Bergman MD  02/26/24 1033    I independently performed a history and physical on Mansoor Walters.     I have discussed the case with the DORA/resident at 1200 and approve / take responsibility for the initial management plan and anticipated disposition as documented below.     In summary the patient presents with with concern for several weeks of shortness of breath cough dyspnea on exertion.  The patient also reports heavy alcohol use and desires abstinence at risk of withdrawal.  My evaluation the patient is afebrile hemodynamically stable and little acute distress though somewhat tachycardic and hypertensive also with a relatively low room air oxygen saturation.  Despite this his breath sounds are clear his abdomen is soft nontender nondistended extremities are warm and well-perfused.  Consideration is given to his multiweek report of dyspnea on exertion and his relative hypoxia here consideration given to an infectious process versus alternative etiology of hypoxic respiratory failure.  He clinically has no obstructive symptoms and again no clear infectious process on history or exam.  Given these multiple considerations however he will benefit from broad workup to better characterize and admission at minimum to evaluate his hypoxia as well as his risk of alcohol withdrawal.  The patient is agreeable to this plan this will conclude his ED course    I interpreted the following studies:    ECG: Sinus rhythm at a rate of 98 without ectopy.   positive pressure ventilation to BiPAP [NG]      ED Course User Index  [NG] Seamus Bergman MD       For further details of Mansoor Walters's emergency department encounter, please see the DORA/resident's documentation. Please note the signature time recorded here indicates the limit of my supervision of this case and should the patient require further management prior to disposition I have signed the case out to my colleague Seamus Lo MD  02/26/24 1239       Seamus Bergman MD  02/26/24 1402       Seamus Bergman MD  02/26/24 1621       Seamus Bergman MD  02/26/24 0310

## 2024-02-26 NOTE — CARE COORDINATION
Case Management Assessment  Initial Evaluation    Date/Time of Evaluation: 2/26/2024 1:32 PM  Assessment Completed by: SHAUNA Connell    If patient is discharged prior to next notation, then this note serves as note for discharge by case management.    Patient Name: Mansoor Walters                   YOB: 1995  Diagnosis: No admission diagnoses are documented for this encounter.                   Date / Time: 2/26/2024 10:13 AM    Patient Admission Status: Emergency   Readmission Risk (Low < 19, Mod (19-27), High > 27): No data recorded  Current PCP: No primary care provider on file.  PCP verified by CM? Yes    Chart Reviewed: Yes      History Provided by: Patient  Patient Orientation: Alert and Oriented    Patient Cognition: Alert    Hospitalization in the last 30 days (Readmission):  No    If yes, Readmission Assessment in CM Navigator will be completed.    Advance Directives:      Code Status: Prior   Patient's Primary Decision Maker is: Legal Next of Kin    Primary Decision Maker: Tasia Walters [facility] - [phone]    Secondary Decision Maker: Anthony Walters [facility] - [phone]    Discharge Planning:    Patient lives with: (P) Friends Type of Home: (P) House  Primary Care Giver: Self  Patient Support Systems include: Family Members, Friends/Neighbors   Current Financial resources: None  Current community resources: [facility]  Current services prior to admission: (P) None            Current DME:              Type of Home Care services:  (P) None    ADLS  Prior functional level: Independent in ADLs/IADLs  Current functional level: Independent in ADLs/IADLs    PT AM-PAC:   /24  OT AM-PAC:   /24    Family can provide assistance at DC: No  Would you like Case Management to discuss the discharge plan with any other family members/significant others, and if so, who? No  Plans to Return to Present Housing: Yes  Other Identified Issues/Barriers to RETURNING to current housing: none  noted  Potential Assistance needed at discharge: (P) N/A            Potential DME:    Patient expects to discharge to: (P) House  Plan for transportation at discharge: (P) Self    Financial    Payor: UMR / Plan: UMR / Product Type: *No Product type* /     Does insurance require precert for SNF: Yes    Potential assistance Purchasing Medications: (P) No  Meds-to-Beds request:        [id number] - [place], [place] - [address] - P [phone] - F [phone]  [address]  [facility] [address]  Phone: [phone] Fax: [phone]    [facility] - [place], [place] - [address] [facility] - P [phone] - F [phone]  [address]  [facility] [address]  Phone: [phone] Fax: [phone]      Notes:    Factors facilitating achievement of predicted outcomes: Friend support, Motivated, Cooperative, and Pleasant    Barriers to discharge: Medical complications    Additional Case Management Notes: Referred to patient for ETOH abuse.  Spoke to patient.  Patient is a 28 year old male admitted for ETOH w.d.  patient lives at home with 2 roommates.  Patient is independent in ADLs.  Patient reports he is drinking 1-2 liters of alcohol a day and a 4 pack of mixed drinks.  Patient is interested in quitting.  Patient agrees he is ready to get help.  He is open to inpatient and/or outpatient treatment.  Resources given and discussed at length.  Supportive counseling and encouragement provided.  Patient has PCP.  Patient desats when sleeping. Reports he needs sleep study and does not have pulmonologist.  List of pulmonary in network with insurance provided.  Patient denies other needs at this time.      The Plan for Transition of Care is related to the following treatment goals of No admission diagnoses are documented for this encounter.    IF APPLICABLE: The Patient and/or patient representative Mansoor and his family were provided with a choice of provider and agrees with  the discharge plan. Freedom of choice list with basic dialogue that supports the patient's individualized plan of care/goals and shares the quality data associated with the providers was provided to:     Patient Representative Name:       The Patient and/or Patient Representative Agree with the Discharge Plan?      SHAUNA Connell, [place] NENAW-S   Case Management Department  Ph: [phone] Fax: [phone]

## 2024-02-26 NOTE — PROGRESS NOTES
02/26/24 1857   NIV Type   NIV Started/Stopped On   Equipment Type v60   Mode CPAP   Mask Type Full face mask   Mask Size Large   Assessment   SpO2 99 %   Settings/Measurements   CPAP/EPAP 10 cmH2O   Vt (Measured) 515 mL   FiO2  (S)  45 %   Patient's Home Machine No   Alarm Settings   Alarms On Y   Low Pressure (cmH2O) 6 cmH2O   High Pressure (cmH2O) 30 cmH2O

## 2024-02-26 NOTE — H&P
V2.0  History and Physical      Name:  Mansoor Walters /Age/Sex: 1995  (28 y.o. male)   MRN & CSN:  [id number] & [id number] Encounter Date/Time: 2024 4:49 PM EST   Location:  10/10 PCP: No primary care provider on file.       Hospital Day: 1    Assessment and Plan:   Mansoor Walters is a 28 y.o. male with a pmh of alcohol use disorder and depression morbid obesity hypertension chronic GERD who presents with Community acquired bacterial pneumonia    Hospital Problems             Last Modified POA    * (Principal) Community acquired bacterial pneumonia 2024 Yes       Alcohol intoxication impending withdrawals on CIWA protocol with Ativan advance diet as tolerated receiving fluids in the ED with multivitamin continue to monitor with CIWA protocol.  CIWA protocol with Ativan, aware of the risk and benefit but the last drink was last night heavy drinker very high risk for withdrawals.  Morbid obesity with undiagnosed sleep apnea noticed.  Started on CPAP at 8 mmHg pressure.  Initially seen of admission to the floor but the transfer order has now been placed to move the patient to stepdown keeping in mind the fact the patient will be needing Ativan for the withdrawals and high risk for decompensation.  Did discuss about phenobarb versus Ativan patient says Ativan works and not willing to go for phenobarb discussed all the risks and benefits of the situation right now.  Recommend sleep study outpatient when the patient is stable  Depression with no active suicidal thoughts.  Psychiatry consulted recommend starting antidepressants but not starting until psychiatry sees the patient.  Patient has previous history of reaction to some depressive medications with suicidal thoughts  Acute hypoxic respiratory failure with community-acquired pneumonia started on ceftriaxone and azithromycin protocol has been placed continue to monitor and wean off antibiotics based on culture results  Morbid obesity with a BMI of     BLOODU Negative 08/20/2021 11:40 AM    GLUCOSEU Negative 08/20/2021 11:40 AM    KETUA Negative 08/20/2021 11:40 AM     Urine Cultures: No results found for: \"LABURIN\"  Blood Cultures: No results found for: \"BC\"  No results found for: \"BLOODCULT2\"  Organism: No results found for: \"ORG\"    Imaging/Diagnostics Last 24 Hours   XR CHEST PORTABLE    Result Date: 2/26/2024  EXAMINATION: ONE XRAY VIEW OF THE CHEST 2/26/2024 1:59 pm COMPARISON: CXR dated 02/26/2024 HISTORY: ORDERING SYSTEM PROVIDED HISTORY: sob TECHNOLOGIST PROVIDED HISTORY: Reason for exam:->sob Reason for Exam: sob FINDINGS: There is mild apparent widening of the mediastinum, may be projectional in nature. Low lung volumes and patchy bibasilar opacities.  Multiple nodular densities primarily in the right lung, difficult to ascertain whether these represent nodules are vessels en [place]. Recommend repeat radiograph with max inspiration and lateral views. No pneumothorax or pleural effusion.     1. There is mild apparent widening of the mediastinum, may be projectional in nature. 2. Low lung volumes and patchy bibasilar opacities.  Multiple nodular densities primarily in the right lung, difficult to ascertain whether these represent nodules are vessels en [place]. 3. Recommend repeat radiograph with max inspiration and lateral views.     CT ABDOMEN PELVIS W IV CONTRAST Additional Contrast? None    Result Date: 2/26/2024  EXAMINATION: CT OF THE ABDOMEN AND PELVIS WITH CONTRAST 2/26/2024 12:41 pm TECHNIQUE: CT of the abdomen and pelvis was performed with the administration of intravenous contrast. Multiplanar reformatted images are provided for review. Automated exposure control, iterative reconstruction, and/or weight based adjustment of the mA/kV was utilized to reduce the radiation dose to as low as reasonably achievable. COMPARISON: July 2022 HISTORY: ORDERING SYSTEM PROVIDED HISTORY: abdominal pain with N/V TECHNOLOGIST PROVIDED HISTORY: Reason for  cardiomegaly.  Prominence of the pulmonary vasculature could represent pulmonary vascular congestion. No discrete focal lung consolidation is seen.       Personally reviewed Lab Studies, Imaging    Electronically signed by Yves Faith MD on 2/26/2024 at 4:49 PM

## 2024-02-27 LAB
ANION GAP SERPL CALCULATED.3IONS-SCNC: 10 MMOL/L (ref 3–16)
BACTERIA UR CULT: NORMAL
BASE EXCESS BLDA CALC-SCNC: -0.4 MMOL/L (ref -3–3)
BASE EXCESS BLDA CALC-SCNC: -0.4 MMOL/L (ref -3–3)
BASE EXCESS BLDA CALC-SCNC: 1 MMOL/L (ref -3–3)
BASE EXCESS BLDA CALC-SCNC: 5 MMOL/L (ref -3–3)
BASE EXCESS BLDV CALC-SCNC: 0.5 MMOL/L (ref -3–3)
BUN SERPL-MCNC: 7 MG/DL (ref 7–20)
CALCIUM SERPL-MCNC: 7.8 MG/DL (ref 8.3–10.6)
CHLORIDE SERPL-SCNC: 97 MMOL/L (ref 99–110)
CO2 BLDA-SCNC: 28.8 MMOL/L
CO2 BLDA-SCNC: 31.2 MMOL/L
CO2 BLDA-SCNC: 31.4 MMOL/L
CO2 BLDA-SCNC: 33 MMOL/L
CO2 BLDV-SCNC: 28 MMOL/L
CO2 SERPL-SCNC: 28 MMOL/L (ref 21–32)
COHGB MFR BLDA: 0.7 % (ref 0–1.5)
COHGB MFR BLDA: 1.1 % (ref 0–1.5)
COHGB MFR BLDA: 1.1 % (ref 0–1.5)
COHGB MFR BLDV: 2 % (ref 0–1.5)
CREAT SERPL-MCNC: 1 MG/DL (ref 0.9–1.3)
DEPRECATED RDW RBC AUTO: 17.2 % (ref 12.4–15.4)
EKG ATRIAL RATE: 105 BPM
EKG DIAGNOSIS: NORMAL
EKG P AXIS: 57 DEGREES
EKG P-R INTERVAL: 128 MS
EKG Q-T INTERVAL: 338 MS
EKG QRS DURATION: 90 MS
EKG QTC CALCULATION (BAZETT): 446 MS
EKG R AXIS: 57 DEGREES
EKG T AXIS: 90 DEGREES
EKG VENTRICULAR RATE: 105 BPM
GFR SERPLBLD CREATININE-BSD FMLA CKD-EPI: >60 ML/MIN/{1.73_M2}
GLUCOSE SERPL-MCNC: 149 MG/DL (ref 70–99)
HCO3 BLDA-SCNC: 27.1 MMOL/L (ref 21–29)
HCO3 BLDA-SCNC: 29.2 MMOL/L (ref 21–29)
HCO3 BLDA-SCNC: 29.3 MMOL/L (ref 21–29)
HCO3 BLDA-SCNC: 31.4 MMOL/L (ref 21–29)
HCO3 BLDV-SCNC: 26.3 MMOL/L (ref 23–29)
HCT VFR BLD AUTO: 40.2 % (ref 40.5–52.5)
HGB BLD-MCNC: 12.8 G/DL (ref 13.5–17.5)
HGB BLDA-MCNC: 13.8 G/DL (ref 13.5–17.5)
HGB BLDA-MCNC: 14 G/DL (ref 13.5–17.5)
HGB BLDA-MCNC: 14.7 G/DL (ref 13.5–17.5)
MAGNESIUM SERPL-MCNC: 2.1 MG/DL (ref 1.8–2.4)
MCH RBC QN AUTO: 29.5 PG (ref 26–34)
MCHC RBC AUTO-ENTMCNC: 31.9 G/DL (ref 31–36)
MCV RBC AUTO: 92.4 FL (ref 80–100)
METHGB MFR BLDA: 0.3 %
METHGB MFR BLDA: 0.3 %
METHGB MFR BLDA: 0.4 %
METHGB MFR BLDV: 0.1 %
O2 THERAPY: ABNORMAL
PCO2 BLDA: 56.6 MMHG (ref 35–45)
PCO2 BLDA: 61.4 MM HG (ref 35–45)
PCO2 BLDA: 63.6 MMHG (ref 35–45)
PCO2 BLDA: 71.6 MMHG (ref 35–45)
PCO2 BLDV: 46.6 MMHG (ref 40–50)
PERFORMED ON: ABNORMAL
PH BLDA: 7.23 [PH] (ref 7.35–7.45)
PH BLDA: 7.28 [PH] (ref 7.35–7.45)
PH BLDA: 7.3 [PH] (ref 7.35–7.45)
PH BLDA: 7.32 [PH] (ref 7.35–7.45)
PH BLDV: 7.37 [PH] (ref 7.35–7.45)
PHOSPHATE SERPL-MCNC: 4.4 MG/DL (ref 2.5–4.9)
PLATELET # BLD AUTO: 284 K/UL (ref 135–450)
PMV BLD AUTO: 7.3 FL (ref 5–10.5)
PO2 BLDA: 102.3 MMHG (ref 75–108)
PO2 BLDA: 74.2 MMHG (ref 75–108)
PO2 BLDA: 74.7 MM HG (ref 75–108)
PO2 BLDA: 86.7 MMHG (ref 75–108)
PO2 BLDV: 127.5 MMHG (ref 25–40)
POC SAMPLE TYPE: ABNORMAL
POTASSIUM SERPL-SCNC: 5 MMOL/L (ref 3.5–5.1)
RBC # BLD AUTO: 4.35 M/UL (ref 4.2–5.9)
SAO2 % BLDA: 92.9 %
SAO2 % BLDA: 93 % (ref 93–100)
SAO2 % BLDA: 96.3 %
SAO2 % BLDA: 97.6 %
SAO2 % BLDV: 99 %
SODIUM SERPL-SCNC: 135 MMOL/L (ref 136–145)
WBC # BLD AUTO: 10.3 K/UL (ref 4–11)

## 2024-02-27 PROCEDURE — 6360000002 HC RX W HCPCS: Performed by: INTERNAL MEDICINE

## 2024-02-27 PROCEDURE — 94660 CPAP INITIATION&MGMT: CPT

## 2024-02-27 PROCEDURE — 6370000000 HC RX 637 (ALT 250 FOR IP): Performed by: HOSPITALIST

## 2024-02-27 PROCEDURE — 94761 N-INVAS EAR/PLS OXIMETRY MLT: CPT

## 2024-02-27 PROCEDURE — 2700000000 HC OXYGEN THERAPY PER DAY

## 2024-02-27 PROCEDURE — 2580000003 HC RX 258: Performed by: STUDENT IN AN ORGANIZED HEALTH CARE EDUCATION/TRAINING PROGRAM

## 2024-02-27 PROCEDURE — C9113 INJ PANTOPRAZOLE SODIUM, VIA: HCPCS | Performed by: INTERNAL MEDICINE

## 2024-02-27 PROCEDURE — 36415 COLL VENOUS BLD VENIPUNCTURE: CPT

## 2024-02-27 PROCEDURE — 99291 CRITICAL CARE FIRST HOUR: CPT | Performed by: INTERNAL MEDICINE

## 2024-02-27 PROCEDURE — 6370000000 HC RX 637 (ALT 250 FOR IP): Performed by: NURSE PRACTITIONER

## 2024-02-27 PROCEDURE — 85027 COMPLETE CBC AUTOMATED: CPT

## 2024-02-27 PROCEDURE — 82803 BLOOD GASES ANY COMBINATION: CPT

## 2024-02-27 PROCEDURE — 2580000003 HC RX 258: Performed by: INTERNAL MEDICINE

## 2024-02-27 PROCEDURE — 6370000000 HC RX 637 (ALT 250 FOR IP): Performed by: PHYSICIAN ASSISTANT

## 2024-02-27 PROCEDURE — 2500000003 HC RX 250 WO HCPCS: Performed by: INTERNAL MEDICINE

## 2024-02-27 PROCEDURE — 6370000000 HC RX 637 (ALT 250 FOR IP): Performed by: STUDENT IN AN ORGANIZED HEALTH CARE EDUCATION/TRAINING PROGRAM

## 2024-02-27 PROCEDURE — 6370000000 HC RX 637 (ALT 250 FOR IP): Performed by: INTERNAL MEDICINE

## 2024-02-27 PROCEDURE — 84100 ASSAY OF PHOSPHORUS: CPT

## 2024-02-27 PROCEDURE — 83735 ASSAY OF MAGNESIUM: CPT

## 2024-02-27 PROCEDURE — 80048 BASIC METABOLIC PNL TOTAL CA: CPT

## 2024-02-27 PROCEDURE — 2000000000 HC ICU R&B

## 2024-02-27 PROCEDURE — 6360000002 HC RX W HCPCS: Performed by: STUDENT IN AN ORGANIZED HEALTH CARE EDUCATION/TRAINING PROGRAM

## 2024-02-27 RX ORDER — LISINOPRIL 20 MG/1
20 TABLET ORAL DAILY
Status: DISCONTINUED | OUTPATIENT
Start: 2024-02-27 | End: 2024-03-05 | Stop reason: HOSPADM

## 2024-02-27 RX ORDER — CARVEDILOL 6.25 MG/1
6.25 TABLET ORAL 2 TIMES DAILY WITH MEALS
Status: DISCONTINUED | OUTPATIENT
Start: 2024-02-27 | End: 2024-02-29

## 2024-02-27 RX ORDER — HYDRALAZINE HYDROCHLORIDE 50 MG/1
50 TABLET, FILM COATED ORAL EVERY 8 HOURS SCHEDULED
Status: DISCONTINUED | OUTPATIENT
Start: 2024-02-27 | End: 2024-03-05 | Stop reason: HOSPADM

## 2024-02-27 RX ORDER — AMLODIPINE BESYLATE 5 MG/1
10 TABLET ORAL DAILY
Status: DISCONTINUED | OUTPATIENT
Start: 2024-02-27 | End: 2024-03-05 | Stop reason: HOSPADM

## 2024-02-27 RX ORDER — HEPARIN SODIUM 5000 [USP'U]/ML
7500 INJECTION, SOLUTION INTRAVENOUS; SUBCUTANEOUS EVERY 8 HOURS SCHEDULED
Status: DISCONTINUED | OUTPATIENT
Start: 2024-02-27 | End: 2024-03-05 | Stop reason: HOSPADM

## 2024-02-27 RX ADMIN — FUROSEMIDE 40 MG: 10 INJECTION, SOLUTION INTRAMUSCULAR; INTRAVENOUS at 17:12

## 2024-02-27 RX ADMIN — SODIUM CHLORIDE 7.5 MG/HR: 9 INJECTION, SOLUTION INTRAVENOUS at 05:04

## 2024-02-27 RX ADMIN — HYDRALAZINE HYDROCHLORIDE 50 MG: 50 TABLET, FILM COATED ORAL at 21:08

## 2024-02-27 RX ADMIN — CARVEDILOL 6.25 MG: 6.25 TABLET, FILM COATED ORAL at 16:29

## 2024-02-27 RX ADMIN — CEFTRIAXONE SODIUM 1000 MG: 1 INJECTION, POWDER, FOR SOLUTION INTRAMUSCULAR; INTRAVENOUS at 10:05

## 2024-02-27 RX ADMIN — ONDANSETRON 4 MG: 4 TABLET, ORALLY DISINTEGRATING ORAL at 10:19

## 2024-02-27 RX ADMIN — DOXYCYCLINE 100 MG: 100 INJECTION, POWDER, LYOPHILIZED, FOR SOLUTION INTRAVENOUS at 11:22

## 2024-02-27 RX ADMIN — PHENOBARBITAL SODIUM 65 MG: 65 INJECTION INTRAMUSCULAR; INTRAVENOUS at 08:55

## 2024-02-27 RX ADMIN — PHENOBARBITAL SODIUM 65 MG: 65 INJECTION INTRAMUSCULAR; INTRAVENOUS at 20:04

## 2024-02-27 RX ADMIN — DOXYCYCLINE 100 MG: 100 INJECTION, POWDER, LYOPHILIZED, FOR SOLUTION INTRAVENOUS at 23:40

## 2024-02-27 RX ADMIN — Medication 0.5 MCG/KG/HR: at 03:02

## 2024-02-27 RX ADMIN — LISINOPRIL 20 MG: 20 TABLET ORAL at 09:53

## 2024-02-27 RX ADMIN — Medication 10 ML: at 20:04

## 2024-02-27 RX ADMIN — AMLODIPINE BESYLATE 10 MG: 5 TABLET ORAL at 09:53

## 2024-02-27 RX ADMIN — LORAZEPAM 2 MG: 2 INJECTION INTRAMUSCULAR; INTRAVENOUS at 01:07

## 2024-02-27 RX ADMIN — HEPARIN SODIUM 7500 UNITS: 5000 INJECTION INTRAVENOUS; SUBCUTANEOUS at 14:22

## 2024-02-27 RX ADMIN — HEPARIN SODIUM 5000 UNITS: 5000 INJECTION INTRAVENOUS; SUBCUTANEOUS at 05:43

## 2024-02-27 RX ADMIN — MUPIROCIN: 20 OINTMENT TOPICAL at 10:23

## 2024-02-27 RX ADMIN — LORAZEPAM 1 MG: 1 TABLET ORAL at 11:51

## 2024-02-27 RX ADMIN — SODIUM CHLORIDE: 9 INJECTION, SOLUTION INTRAVENOUS at 10:04

## 2024-02-27 RX ADMIN — ACETAMINOPHEN 325MG 650 MG: 325 TABLET ORAL at 05:54

## 2024-02-27 RX ADMIN — SODIUM CHLORIDE 7.5 MG/HR: 9 INJECTION, SOLUTION INTRAVENOUS at 08:53

## 2024-02-27 RX ADMIN — HYDRALAZINE HYDROCHLORIDE 50 MG: 50 TABLET, FILM COATED ORAL at 14:22

## 2024-02-27 RX ADMIN — SODIUM CHLORIDE 9 MG/HR: 9 INJECTION, SOLUTION INTRAVENOUS at 02:00

## 2024-02-27 RX ADMIN — Medication 100 MG: at 08:58

## 2024-02-27 RX ADMIN — THIAMINE HYDROCHLORIDE 100 MG: 100 INJECTION, SOLUTION INTRAMUSCULAR; INTRAVENOUS at 08:58

## 2024-02-27 RX ADMIN — FUROSEMIDE 40 MG: 10 INJECTION, SOLUTION INTRAMUSCULAR; INTRAVENOUS at 08:58

## 2024-02-27 RX ADMIN — Medication 10 ML: at 08:59

## 2024-02-27 RX ADMIN — HEPARIN SODIUM 7500 UNITS: 5000 INJECTION INTRAVENOUS; SUBCUTANEOUS at 21:09

## 2024-02-27 RX ADMIN — PANTOPRAZOLE SODIUM 40 MG: 40 INJECTION, POWDER, FOR SOLUTION INTRAVENOUS at 08:58

## 2024-02-27 RX ADMIN — MUPIROCIN: 20 OINTMENT TOPICAL at 20:04

## 2024-02-27 ASSESSMENT — PAIN DESCRIPTION - LOCATION: LOCATION: HEAD

## 2024-02-27 ASSESSMENT — PAIN SCALES - GENERAL: PAINLEVEL_OUTOF10: 8

## 2024-02-27 NOTE — PROGRESS NOTES
Shift: [id number]     Admitting diagnosis: ARF with hypercapnia, HTN emergency, ETOH withdrawal and pulmonary congestion.     Presentation to hospital: increasing sob    Surgery: no     Nursing assessment at handoff  stable    Emergency Contact/POA:Anthony, Father  Family updated: yes - at bedsode    Most recent vitals: /79   Pulse 84   Temp 99.6 °F (37.6 °C) (Bladder)   Resp 20   Ht 1.803 m (5' 11\")   Wt (!) 204.1 kg (450 lb)   SpO2 90%   BMI 62.76 kg/m²      Rhythm: Normal Sinus Rhythm      NC/HFNC- 2 lpm  Respiratory support: - No ventilator support  - BiPAP   IPAP: 20 cmH20, CPAP/EPAP: 5 cmH2O only when sleeping    Vent days: Day 0    Increased O2 requirements: no    Admission weight Weight - Scale: (!) 204.1 kg (450 lb)  Today's weight   Wt Readings from Last 1 Encounters:   02/26/24 (!) 204.1 kg (450 lb)         UOP >30ml/hr: yes - 975 ml    Davis need assessed each shift: yes, remains in place    Restraints: no  Order current and documentation up to date?    Lines/Drains  LDA Insertion Date Discontinued Date Dressing Changes   PIV   2/26     TLC       Arterial       Davis   2/26     Vas Cath      ETT       Surgical drains        Night Shift Hospitalist Interventions    Problem(Brief) Date Time Intervention Physician contacted                                               Drip rates at handoff:    sodium chloride Stopped (02/27/24 1122)     Hospital Course Daily Updates:  Admit Day# 0  - Pt to wear BiPAP at night and when sleeping  - Repeat ABG in am on 6/28  - Weaned off Precedex and Nicardipine to PO meds  - Continue to monitor CiWA scores     Lab Data:   CBC:   Recent Labs     02/26/24  1040 02/27/24  0435   WBC 5.3 10.3   HGB 12.3* 12.8*   HCT 38.5* 40.2*   MCV 91.7 92.4    284     BMP:    Recent Labs     02/26/24  1040 02/27/24  0435   * 135*   K 4.1 5.0   CO2 26 28   BUN 5* 7   CREATININE <0.5* 1.0     LIVR:   Recent Labs     02/26/24  1040   AST 59*   ALT 41*     PT/INR:   Recent  Labs     02/26/24  1040   PROT 6.8     APTT: No results for input(s): \"APTT\" in the last 72 hours.  ABG:   Recent Labs     02/27/24  0624 02/27/24  0928   PHART 7.298* 7.317*   FYW1QPM 56.6* 61.4*   PO2ART 102.3 74.7*     Consults (if GI or Nephrology- which group?)-  Psychiatry, Critical Care

## 2024-02-27 NOTE — CONSULTS
INPATIENT PULMONARY CRITICAL CARE CONSULT NOTE      Chief Complaint/Referring Provider:  Patient is being seen at the request of Dr. Faith for a consultation for alcohol intoxication, morbidly obese, undiagnosed sleep apnea with multiple apneic spells about to be using Ativan and phenobarb per CIWA protocol, high risk for decompensation     Presenting HPI: Patient presents to the hospital because of increasing shortness of breath    As per the ER provider-patient presents with with concern for several weeks of shortness of breath cough dyspnea on exertion.  The patient also reports heavy alcohol use and desires abstinence at risk of withdrawal.  My evaluation the patient is afebrile hemodynamically stable and little acute distress though somewhat tachycardic and hypertensive also with a relatively low room air oxygen saturation.  Despite this his breath sounds are clear his abdomen is soft nontender nondistended extremities are warm and well-perfused.  Consideration is given to his multiweek report of dyspnea on exertion and his relative hypoxia here consideration given to an infectious process versus alternative etiology of hypoxic respiratory failure.  He clinically has no obstructive symptoms and again no clear infectious process on history or exam.  Given these multiple considerations however he will benefit from broad workup to better characterize and admission at minimum to evaluate his hypoxia as well as his risk of alcohol withdrawal.  The patient is agreeable to this plan this will conclude his ED course   Patient when seen was on CPAP with oxygen supplementation, patient was having increased work of breathing, patient states that he has been having increased shortness of breath for last 3 weeks or so, patient states any activity causes him to have shortness of breath, patient has cough but no expectoration, patient did not have any chest pain, patient does not have any epistaxis or hemoptysis, patient did  rhythm  Patient to be started on Lasix 40 mg IV push twice daily  Patient may require Lasix infusion  Patient may require repeat echocardiogram  Patient does have a history of alcohol use on a daily basis and patient will benefit from Precedex infusion especially in view that patient is symptomatic and also patient is on BiPAP and also patient has history of alcohol use and orders all placed  CIWA protocol is on board  Nicotine replacement therapy if the patient wants  Monitor input output and BMP  Correct electrolytes on whenever necessary basis  Of note is being changed to subcutaneous heparin for DVT prophylaxis due to morbid obesity  Nicotine replacement therapy  PUD prophylaxis    Case discussed with patient and ICU team    Patient's clinical status is precarious and critical and has potential for further decompensation and needs to monitor closely in the ICU    Critical care time from the patient was 35 minutes exclusive of any procedures            Electronically signed by:  SARAH HAMILTON MD    2/26/2024    7:45 PM.

## 2024-02-27 NOTE — PLAN OF CARE
Reviewed treatment goals and plan with family.     Problem: Discharge Planning  Goal: Discharge to home or other facility with appropriate resources  2/27/2024 1851 by Alexi Flanagan RN  Outcome: Progressing  Flowsheets (Taken 2/27/2024 1851)  Discharge to home or other facility with appropriate resources:   Identify barriers to discharge with patient and caregiver   Arrange for needed discharge resources and transportation as appropriate   Identify discharge learning needs (meds, wound care, etc)   Arrange for interpreters to assist at discharge as needed   Refer to discharge planning if patient needs post-hospital services based on physician order or complex needs related to functional status, cognitive ability or social support system

## 2024-02-27 NOTE — PROGRESS NOTES
02/26/24 6183   NIV Type   NIV Started/Stopped On   Equipment Type v60   Mode Bilevel   Mask Type Full face mask   Mask Size Large   Assessment   Pulse (!) 124   Respirations 25   SpO2 96 %   Settings/Measurements   IPAP 15 cmH20   CPAP/EPAP 5 cmH2O   Vt (Measured) 602 mL   Rate Ordered 14   FiO2  (S)  40 %   Mask Leak (lpm) 60 lpm   Patient's Home Machine No   Alarm Settings   Alarms On Y   Low Pressure (cmH2O) 6 cmH2O   High Pressure (cmH2O) 30 cmH2O   Patient Observation   Observations mepilex on nose

## 2024-02-27 NOTE — PROGRESS NOTES
V2.0    [facility] [facility] [place] Progress Note      Name:  Mansoor Walters /Age/Sex: 1995  (28 y.o. male)   MRN & CSN:  [id number] & [id number] Encounter Date/Time: 2024 12:23 PM EST   Location:  [facility]024- PCP: No primary care provider on file.     Attending:Ngiel Wilkins MD       Hospital Day: 2    Assessment and Recommendations     Patient is a 28-year-old morbidly obese male past medical history of alcohol abuse, GERD, hypertension, depression, who was admitted for acute alcohol intoxication pending withdrawal and acute hypoxic and hypercapnic respiratory failure.    #.  Acute alcohol withdrawal  Switch oral to IV thiamine and continue folic acid multivitamin  Continue Ativan per [facility] protocol  Patient was currently counseled on alcohol abstinence      #.  Acute hypoxic and hypercapnic respiratory failure  Multifactorial likely due to obesity hypoventilation syndrome, underlying sleep apnea,  Patient has been successfully weaned off to nasal cannula from BiPAP  Bronchodilators  Continue empiric antibiotics    #.  Hypertensive urgency with history of underlying uncontrolled hypertension  Continue lisinopril, amlodipine, IV Lasix  Obtain echocardiogram  Start Coreg and hydralazine 3 times daily  Wean off nicardipine drip    #/History of depression    #.  Morbid obesity  Patient was strictly counseled on dietary and lifestyle modifications        DVT Prophylaxis: Lovenox.   Code Status: Total Support.   Barrier to DC: Pending clinical improvement, wean off Cardene drip, echo pending,          Subjective:     Patient was seen and examined today.  He states he is feeling better.  No acute events overnight.  Patient kareem afebrile with stable vital signs.    Review of Systems:      Pertinent positives and negatives discussed in HPI    Objective:     Intake/Output Summary (Last 24 hours) at 2024 1223  Last data filed at 2024 1205  Gross per 24 hour   Intake 984.84 ml   Output 3800 ml   Net -2815.16 ml

## 2024-02-27 NOTE — PROGRESS NOTES
Patient admitted from ED to bed 241. Admission assessment completed and documented on flowsheets. Four eyes skin assessment completed with ALBINO Bucio. Sacral mepilex applied. Chlorhexidine bath given. VSS, will continue to monitor.

## 2024-02-27 NOTE — CARE COORDINATION
LOS 1- SPoke with the pt, awake oriented. The pt was on Bipap now is on O2 /NC. SOB just talking. Pt was receptive to getting help with his alcohol addiction. He said he has been an alcoholic for 3 years and knows he needs to quit. Discussed inpt and oupt  resources /rehabs. He said his support systems is his parents and brother. He will review with them later and decide which avenue to take.

## 2024-02-27 NOTE — PROGRESS NOTES
02/27/24 0752   NIV Type   NIV Started/Stopped On   Equipment Type V60   Mode Bilevel   Assessment   Pulse 84   Respirations 20   SpO2 95 %   Breath Sounds   Right Upper Lobe Diminished   Right Middle Lobe Diminished   Right Lower Lobe Diminished   Left Upper Lobe Diminished   Left Lower Lobe Diminished   Settings/Measurements   PIP Observed 20 cm H20   IPAP 20 cmH20   CPAP/EPAP 5 cmH2O   Vt (Measured) 1000 mL   Rate Ordered 14   FiO2  50 %   I Time/ I Time % 1 s   Minute Volume (L/min) 23 Liters   Mask Leak (lpm) 40 lpm   Alarm Settings   Alarms On Y   Low Pressure (cmH2O) 30 cmH2O   High Pressure (cmH2O) 8 cmH2O   Apnea (secs) 20 secs   RR Low (bpm) 6   RR High (bpm) 40 br/min

## 2024-02-27 NOTE — PROGRESS NOTES
INPATIENT PULMONARY CRITICAL CARE PROGRESS NOTE      Reason for visit      alcohol intoxication, morbidly obese, undiagnosed sleep apnea with multiple apneic spells about to be using Ativan and phenobarb per CIWA protocol, high risk for decompensation     SUBJECTIVE: Patient when seen this morning continues to be critically ill on BiPAP, patient also continues to be on IV pressors infusion to decrease agitation and have more compliance with the BiPAP, patient continues to be on Cardene infusion, patient is spiking fever, patient has sinus rhythm on the monitor, patient was on 50% oxygen on BiPAP to maintain oxygen saturation, patient urine output was adequate with cumulative fluid balance of -2.5 L, patient was communicative on the BiPAP, patient's glycemic control was somewhat suboptimal, no other pertinent review of system of concern           Physical Exam:  Blood pressure 125/86, pulse 84, temperature (!) 101.6 °F (38.7 °C), temperature source Bladder, resp. rate 20, height 1.803 m (5' 11\"), weight (!) 204.1 kg (450 lb), SpO2 95 %.'     Constitutional: No respite distress on BiPAP  HENT: BiPAP mask intact. No thyromegaly.  Eyes:  Conjunctivae are normal. Pupils equal, round, and reactive to light. No scleral icterus.   Neck: . No tracheal deviation present. No obvious thyroid mass.   Cardiovascular: Normal rate, regular rhythm, normal heart sounds.  No right ventricular heave.  Some lower extremity edema.  Pulmonary/Chest: No wheezes.  Bibasilar rales.  Chest wall is not dull to percussion.  Some accessory muscle usage or stridor.  Decreased breath sound intensity  Abdominal: Soft. Bowel sounds present.  Some distension, no hernia. No tenderness morbid obesity.    Musculoskeletal: No cyanosis. No clubbing. No obvious joint deformity.   Lymphadenopathy: No cervical or supraclavicular adenopathy.   Skin: Skin is warm and dry. No rash or nodules on the exposed extremities.  Some stasis changes in the lower  extremities  Psychiatric: Normal mood and affect. Behavior is normal.  No anxiety.   Neurologic: Alert and communicative on the BiPAP    Results:  CBC:   Recent Labs     02/26/24  1040 02/27/24  0435   WBC 5.3 10.3   HGB 12.3* 12.8*   HCT 38.5* 40.2*   MCV 91.7 92.4    284     BMP:   Recent Labs     02/26/24  1040 02/27/24  0435   * 135*   K 4.1 5.0   CL 98* 97*   CO2 26 28   PHOS  --  4.4   BUN 5* 7   CREATININE <0.5* 1.0     LIVER PROFILE:   Recent Labs     02/26/24  1040   AST 59*   ALT 41*   BILITOT <0.2   ALKPHOS 196*       UA:  Recent Labs     02/26/24  2135   COLORU Straw   PHUR 6.0   CLARITYU Clear   SPECGRAV 1.010   LEUKOCYTESUR Negative   UROBILINOGEN 0.2   BILIRUBINUR Negative   BLOODU Negative   GLUCOSEU Negative       Imaging:  I have reviewed radiology images personally.    XR CHEST PORTABLE   Final Result   1. There is mild apparent widening of the mediastinum, may be projectional in   nature.   2. Low lung volumes and patchy bibasilar opacities.  Multiple nodular   densities primarily in the right lung, difficult to ascertain whether these   represent nodules are vessels en [place].   3. Recommend repeat radiograph with max inspiration and lateral views.         CT ABDOMEN PELVIS W IV CONTRAST Additional Contrast? None   Final Result   Patchy pneumonia at the lung bases      No acute intra-abdominal abnormality         XR CHEST (2 VW)   Preliminary Result   Mild cardiomegaly.  Prominence of the pulmonary vasculature could represent   pulmonary vascular congestion.      No discrete focal lung consolidation is seen.           XR CHEST PORTABLE    Result Date: 2/26/2024  EXAMINATION: ONE XRAY VIEW OF THE CHEST 2/26/2024 1:59 pm COMPARISON: CXR dated 02/26/2024 HISTORY: ORDERING SYSTEM PROVIDED HISTORY: sob TECHNOLOGIST PROVIDED HISTORY: Reason for exam:->sob Reason for Exam: sob FINDINGS: There is mild apparent widening of the mediastinum, may be projectional in nature. Low lung volumes and

## 2024-02-27 NOTE — PROGRESS NOTES
Patient respiratory status worsening. Sent ABG , reviewed with MD LEON wants rate 20:5, repeat ABG in one hour

## 2024-02-27 NOTE — PROGRESS NOTES
02/27/24 0317   NIV Type   $NIV $Daily Charge   Equipment Type v60   Mode Bilevel   Mask Type Full face mask   Mask Size Large   Assessment   Pulse (!) 107   Respirations 25   SpO2 94 %   Settings/Measurements   IPAP 20 cmH20   CPAP/EPAP 5 cmH2O   Vt (Measured) 701 mL   Rate Ordered 14   FiO2  50 %   Alarm Settings   Alarms On Y   Low Pressure (cmH2O) 6 cmH2O   High Pressure (cmH2O) 30 cmH2O   Patient Observation   Observations mepilex on nose

## 2024-02-27 NOTE — CONSULTS
PSYCHIATRIC CONSULTATION  2/26/2024 10:13 AM   2/27/2024    HPI:   Mansoor Walters, a 28-year-old male with a history of alcohol use disorder, depression, morbid obesity, hypertension, and chronic GERD, presented with symptoms suggestive of community-acquired bacterial pneumonia. He was found to be at risk of alcohol intoxication impending withdrawals and was placed on a CIWA protocol with Ativan. Additionally, his morbid obesity with undiagnosed sleep apnea was noted, and he was started on CPAP therapy. Despite initially being admitted to the floor, due to his high risk for decompensation and active alcohol withdrawal, the decision was made to transfer him to the ICU.  Given his hypertensive urgency in the setting of active withdrawal, Mansoor was started on a nicardipine drip to maintain a goal SBP. Due to his high risk for apneic spells and aspirations, a change in management was discussed, and the decision was made to transition him to a protocol for phenobarbital instead of Ativan for alcohol withdrawal management.    During the psychiatric evaluation, Mansoor acknowledged his stressors and coping mechanisms, including his enjoyment of food and alcohol to numb his stressors. Mansoor stated that sometimes he is drinking 1L of vodka and 18 cans of beer in one sitting, drinking till he passes out. He expressed a willingness to make lifestyle changes, including addressing his dietary and drinking habits. Despite his challenges, he denied any suicidal ideation and expressed dayton in spending time with his son. He demonstrated openness to improving his lifestyle and indicated a desire to lead a healthier life.    PAST PSYCHIATRIC HISTORY  None, unless specified above  FAMILY PSYCHIATRIC HISTORY  None, unless specified above  SOCIAL HISTORY  None, unless specified above    OBJECTIVE    Physical  VITALS:  /81   Pulse 87   Temp 99.5 °F (37.5 °C) (Bladder)   Resp 19   Ht 1.803 m (5' 11\")   Wt (!) 204.1 kg (450 lb)    SpO2 92%   BMI 62.76 kg/m²   Patients appearance well-appearing.    Mental Status Examination:   No current loose associations  ConcentrationIntact   Thoughts are within normal limits  Insight and Judgement normal insight and judgment  Knowledge: good  Language: 0 - no aphasia, normal  Speech: appropriate   Mood within normal limits, affect congruent with mood  Orientation: oriented to person, place, time/date, and situation   Hallucinations Absent   Thought Processes: Goal-Directed  Memory intact  suicidal ideations no plan  Homicidal ideations no           Data  Labs:   No results displayed because visit has over 200 results.               Medications  Current Facility-Administered Medications: mupirocin (BACTROBAN) 2 % ointment, , Each Nostril, BID  cefTRIAXone (ROCEPHIN) 1,000 mg in sodium chloride 0.9 % 50 mL IVPB (mini-bag), 1,000 mg, IntraVENous, Q24H  doxycycline (VIBRAMYCIN) 100 mg in sodium chloride 0.9 % 100 mL IVPB, 100 mg, IntraVENous, Q12H  heparin (porcine) injection 7,500 Units, 7,500 Units, SubCUTAneous, 3 times per day  lisinopril (PRINIVIL;ZESTRIL) tablet 20 mg, 20 mg, Oral, Daily  amLODIPine (NORVASC) tablet 10 mg, 10 mg, Oral, Daily  carvedilol (COREG) tablet 6.25 mg, 6.25 mg, Oral, BID WC  hydrALAZINE (APRESOLINE) tablet 50 mg, 50 mg, Oral, 3 times per day  perflutren lipid microspheres (DEFINITY) injection 1.5 mL, 1.5 mL, IntraVENous, ONCE PRN  nicotine (NICODERM CQ) 21 MG/24HR 1 patch, 1 patch, TransDERmal, Daily  potassium chloride (KLOR-CON M) extended release tablet 40 mEq, 40 mEq, Oral, PRN **OR** potassium bicarb-citric acid (EFFER-K) effervescent tablet 40 mEq, 40 mEq, Oral, PRN **OR** potassium chloride 10 mEq/100 mL IVPB (Peripheral Line), 10 mEq, IntraVENous, PRN  magnesium sulfate 2000 mg in 50 mL IVPB premix, 2,000 mg, IntraVENous, PRN  ondansetron (ZOFRAN-ODT) disintegrating tablet 4 mg, 4 mg, Oral, Q8H PRN **OR** ondansetron (ZOFRAN) injection 4 mg, 4 mg, IntraVENous, Q6H  symptoms better  2.Probable discharge per medical team  3.Discharge planning is complete  4 Suicidal ideation is denies suicidal ideation

## 2024-02-27 NOTE — PROGRESS NOTES
02/26/24 1951   NIV Type   Mode Bilevel   Mask Type Full face mask   Mask Size Large   Settings/Measurements   IPAP 15 cmH20   CPAP/EPAP 5 cmH2O   Vt (Measured) 730 mL   Rate Ordered 14   FiO2  (S)  35 %

## 2024-02-28 LAB
ANION GAP SERPL CALCULATED.3IONS-SCNC: 9 MMOL/L (ref 3–16)
BASE EXCESS BLDA CALC-SCNC: 0.4 MMOL/L (ref -3–3)
BUN SERPL-MCNC: 13 MG/DL (ref 7–20)
CALCIUM SERPL-MCNC: 8.4 MG/DL (ref 8.3–10.6)
CHLORIDE SERPL-SCNC: 93 MMOL/L (ref 99–110)
CO2 BLDA-SCNC: 29 MMOL/L
CO2 SERPL-SCNC: 30 MMOL/L (ref 21–32)
COHGB MFR BLDA: 1.3 % (ref 0–1.5)
CREAT SERPL-MCNC: 0.7 MG/DL (ref 0.9–1.3)
DEPRECATED RDW RBC AUTO: 17.4 % (ref 12.4–15.4)
GFR SERPLBLD CREATININE-BSD FMLA CKD-EPI: >60 ML/MIN/{1.73_M2}
GLUCOSE SERPL-MCNC: 95 MG/DL (ref 70–99)
HCO3 BLDA-SCNC: 27.3 MMOL/L (ref 21–29)
HCT VFR BLD AUTO: 38.6 % (ref 40.5–52.5)
HGB BLD-MCNC: 12.3 G/DL (ref 13.5–17.5)
HGB BLDA-MCNC: 13.5 G/DL (ref 13.5–17.5)
MAGNESIUM SERPL-MCNC: 2.2 MG/DL (ref 1.8–2.4)
MCH RBC QN AUTO: 29.5 PG (ref 26–34)
MCHC RBC AUTO-ENTMCNC: 31.8 G/DL (ref 31–36)
MCV RBC AUTO: 92.7 FL (ref 80–100)
METHGB MFR BLDA: 0.3 %
O2 THERAPY: ABNORMAL
PCO2 BLDA: 53.6 MMHG (ref 35–45)
PH BLDA: 7.33 [PH] (ref 7.35–7.45)
PHOSPHATE SERPL-MCNC: 4.4 MG/DL (ref 2.5–4.9)
PLATELET # BLD AUTO: 282 K/UL (ref 135–450)
PMV BLD AUTO: 7.5 FL (ref 5–10.5)
PO2 BLDA: 59.4 MMHG (ref 75–108)
POTASSIUM SERPL-SCNC: 4.5 MMOL/L (ref 3.5–5.1)
RBC # BLD AUTO: 4.16 M/UL (ref 4.2–5.9)
SAO2 % BLDA: 89.8 %
SODIUM SERPL-SCNC: 132 MMOL/L (ref 136–145)
WBC # BLD AUTO: 8 K/UL (ref 4–11)

## 2024-02-28 PROCEDURE — 2580000003 HC RX 258: Performed by: STUDENT IN AN ORGANIZED HEALTH CARE EDUCATION/TRAINING PROGRAM

## 2024-02-28 PROCEDURE — 82803 BLOOD GASES ANY COMBINATION: CPT

## 2024-02-28 PROCEDURE — 6370000000 HC RX 637 (ALT 250 FOR IP): Performed by: NURSE PRACTITIONER

## 2024-02-28 PROCEDURE — 80048 BASIC METABOLIC PNL TOTAL CA: CPT

## 2024-02-28 PROCEDURE — 85027 COMPLETE CBC AUTOMATED: CPT

## 2024-02-28 PROCEDURE — 2000000000 HC ICU R&B

## 2024-02-28 PROCEDURE — 94761 N-INVAS EAR/PLS OXIMETRY MLT: CPT

## 2024-02-28 PROCEDURE — 6370000000 HC RX 637 (ALT 250 FOR IP): Performed by: PHYSICIAN ASSISTANT

## 2024-02-28 PROCEDURE — 36415 COLL VENOUS BLD VENIPUNCTURE: CPT

## 2024-02-28 PROCEDURE — 6360000002 HC RX W HCPCS: Performed by: STUDENT IN AN ORGANIZED HEALTH CARE EDUCATION/TRAINING PROGRAM

## 2024-02-28 PROCEDURE — 83735 ASSAY OF MAGNESIUM: CPT

## 2024-02-28 PROCEDURE — 2700000000 HC OXYGEN THERAPY PER DAY

## 2024-02-28 PROCEDURE — 6370000000 HC RX 637 (ALT 250 FOR IP): Performed by: STUDENT IN AN ORGANIZED HEALTH CARE EDUCATION/TRAINING PROGRAM

## 2024-02-28 PROCEDURE — 94660 CPAP INITIATION&MGMT: CPT

## 2024-02-28 PROCEDURE — C8929 TTE W OR WO FOL WCON,DOPPLER: HCPCS

## 2024-02-28 PROCEDURE — C9113 INJ PANTOPRAZOLE SODIUM, VIA: HCPCS | Performed by: INTERNAL MEDICINE

## 2024-02-28 PROCEDURE — 6370000000 HC RX 637 (ALT 250 FOR IP): Performed by: INTERNAL MEDICINE

## 2024-02-28 PROCEDURE — 84100 ASSAY OF PHOSPHORUS: CPT

## 2024-02-28 PROCEDURE — 6360000002 HC RX W HCPCS: Performed by: INTERNAL MEDICINE

## 2024-02-28 PROCEDURE — 6370000000 HC RX 637 (ALT 250 FOR IP): Performed by: HOSPITALIST

## 2024-02-28 PROCEDURE — 6360000004 HC RX CONTRAST MEDICATION: Performed by: HOSPITALIST

## 2024-02-28 PROCEDURE — 2580000003 HC RX 258: Performed by: INTERNAL MEDICINE

## 2024-02-28 PROCEDURE — 99233 SBSQ HOSP IP/OBS HIGH 50: CPT | Performed by: INTERNAL MEDICINE

## 2024-02-28 RX ORDER — DOXYCYCLINE HYCLATE 100 MG
100 TABLET ORAL EVERY 12 HOURS SCHEDULED
Status: COMPLETED | OUTPATIENT
Start: 2024-02-28 | End: 2024-02-29

## 2024-02-28 RX ORDER — CHLORDIAZEPOXIDE HYDROCHLORIDE 25 MG/1
25 CAPSULE, GELATIN COATED ORAL 3 TIMES DAILY
Status: DISCONTINUED | OUTPATIENT
Start: 2024-02-28 | End: 2024-03-01

## 2024-02-28 RX ORDER — FUROSEMIDE 40 MG/1
40 TABLET ORAL 2 TIMES DAILY
Status: DISCONTINUED | OUTPATIENT
Start: 2024-02-28 | End: 2024-03-05 | Stop reason: HOSPADM

## 2024-02-28 RX ORDER — FAMOTIDINE 20 MG/1
20 TABLET, FILM COATED ORAL 2 TIMES DAILY
Status: DISCONTINUED | OUTPATIENT
Start: 2024-02-28 | End: 2024-03-05 | Stop reason: HOSPADM

## 2024-02-28 RX ADMIN — HYDRALAZINE HYDROCHLORIDE 50 MG: 50 TABLET, FILM COATED ORAL at 15:18

## 2024-02-28 RX ADMIN — MUPIROCIN: 20 OINTMENT TOPICAL at 09:47

## 2024-02-28 RX ADMIN — HEPARIN SODIUM 7500 UNITS: 5000 INJECTION INTRAVENOUS; SUBCUTANEOUS at 21:43

## 2024-02-28 RX ADMIN — HEPARIN SODIUM 7500 UNITS: 5000 INJECTION INTRAVENOUS; SUBCUTANEOUS at 15:19

## 2024-02-28 RX ADMIN — MUPIROCIN: 20 OINTMENT TOPICAL at 21:43

## 2024-02-28 RX ADMIN — LISINOPRIL 20 MG: 20 TABLET ORAL at 09:41

## 2024-02-28 RX ADMIN — CARVEDILOL 6.25 MG: 6.25 TABLET, FILM COATED ORAL at 09:41

## 2024-02-28 RX ADMIN — HYDRALAZINE HYDROCHLORIDE 50 MG: 50 TABLET, FILM COATED ORAL at 21:23

## 2024-02-28 RX ADMIN — CHLORDIAZEPOXIDE HYDROCHLORIDE 25 MG: 25 CAPSULE ORAL at 21:23

## 2024-02-28 RX ADMIN — CHLORDIAZEPOXIDE HYDROCHLORIDE 25 MG: 25 CAPSULE ORAL at 15:18

## 2024-02-28 RX ADMIN — LORAZEPAM 2 MG: 2 INJECTION INTRAMUSCULAR; INTRAVENOUS at 05:40

## 2024-02-28 RX ADMIN — ACETAMINOPHEN 325MG 650 MG: 325 TABLET ORAL at 05:40

## 2024-02-28 RX ADMIN — HYDRALAZINE HYDROCHLORIDE 50 MG: 50 TABLET, FILM COATED ORAL at 05:48

## 2024-02-28 RX ADMIN — DOXYCYCLINE HYCLATE 100 MG: 100 TABLET, COATED ORAL at 21:23

## 2024-02-28 RX ADMIN — HEPARIN SODIUM 7500 UNITS: 5000 INJECTION INTRAVENOUS; SUBCUTANEOUS at 05:48

## 2024-02-28 RX ADMIN — DOXYCYCLINE HYCLATE 100 MG: 100 TABLET, COATED ORAL at 09:44

## 2024-02-28 RX ADMIN — AMLODIPINE BESYLATE 10 MG: 5 TABLET ORAL at 09:41

## 2024-02-28 RX ADMIN — FUROSEMIDE 40 MG: 40 TABLET ORAL at 17:47

## 2024-02-28 RX ADMIN — ACETAMINOPHEN 325MG 650 MG: 325 TABLET ORAL at 17:53

## 2024-02-28 RX ADMIN — PERFLUTREN 1.5 ML: 6.52 INJECTION, SUSPENSION INTRAVENOUS at 07:37

## 2024-02-28 RX ADMIN — PHENOBARBITAL SODIUM 65 MG: 65 INJECTION INTRAMUSCULAR; INTRAVENOUS at 09:48

## 2024-02-28 RX ADMIN — FAMOTIDINE 20 MG: 20 TABLET ORAL at 21:23

## 2024-02-28 RX ADMIN — CARVEDILOL 6.25 MG: 6.25 TABLET, FILM COATED ORAL at 17:47

## 2024-02-28 RX ADMIN — Medication 100 MG: at 09:41

## 2024-02-28 RX ADMIN — LORAZEPAM 1 MG: 2 INJECTION INTRAMUSCULAR; INTRAVENOUS at 00:00

## 2024-02-28 RX ADMIN — Medication 10 ML: at 21:24

## 2024-02-28 RX ADMIN — CEFTRIAXONE SODIUM 1000 MG: 1 INJECTION, POWDER, FOR SOLUTION INTRAMUSCULAR; INTRAVENOUS at 12:08

## 2024-02-28 ASSESSMENT — PAIN DESCRIPTION - ONSET: ONSET: GRADUAL

## 2024-02-28 ASSESSMENT — PAIN DESCRIPTION - LOCATION
LOCATION: HEAD
LOCATION: HEAD

## 2024-02-28 ASSESSMENT — PAIN DESCRIPTION - PAIN TYPE: TYPE: ACUTE PAIN

## 2024-02-28 ASSESSMENT — PAIN DESCRIPTION - DESCRIPTORS
DESCRIPTORS: ACHING;THROBBING
DESCRIPTORS: DULL

## 2024-02-28 ASSESSMENT — PAIN SCALES - GENERAL
PAINLEVEL_OUTOF10: 0
PAINLEVEL_OUTOF10: 0
PAINLEVEL_OUTOF10: 4
PAINLEVEL_OUTOF10: 4

## 2024-02-28 ASSESSMENT — PAIN DESCRIPTION - FREQUENCY: FREQUENCY: INTERMITTENT

## 2024-02-28 ASSESSMENT — PAIN - FUNCTIONAL ASSESSMENT: PAIN_FUNCTIONAL_ASSESSMENT: PREVENTS OR INTERFERES SOME ACTIVE ACTIVITIES AND ADLS

## 2024-02-28 NOTE — PROGRESS NOTES
V2.0    [facility] [place]      Name:  Mansoor Walters /Age/Sex: 1995  (28 y.o. male)   MRN & CSN:  [id number] & [id number] Encounter Date/Time: 2024 12:23 PM EST   Location:  [address] [place] [address] PCP: No primary care provider on file.     Attending:Nigel Wilkins MD       Hospital Day: 3    Assessment and Recommendations     Patient is a 28-year-old morbidly obese male past medical history of alcohol abuse, GERD, hypertension, depression, who was admitted for acute alcohol intoxication pending withdrawal and acute hypoxic and hypercapnic respiratory failure.    #.  Acute alcohol withdrawal   IV thiamine and continue folic acid multivitamin  Continue Ativan per [facility] protocol   counseled on alcohol abstinence      #.  Acute hypoxic and hypercapnic respiratory failure  Multifactorial likely due to obesity hypoventilation syndrome, underlying sleep apnea,  Patient has been successfully weaned off to nasal cannula from BiPAP  Bronchodilators  Continue empiric antibiotics    #.  Hypertensive urgency with history of underlying uncontrolled hypertension  Continue lisinopril, amlodipine, IV Lasix  Echo reviewed   Coreg and hydralazine 3 times daily  Nicardapine ggt wean off.     #/History of depression    #.  Morbid obesity  Patient was strictly counseled on dietary and lifestyle modifications        DVT Prophylaxis: Lovenox.   Code Status: Total Support.   Barrier to DC: Pending clinical improvement        Subjective:     Patient was seen and examined today.  He states he is feeling better.  No acute events overnight.  Patient kareem afebrile with stable vital signs.    Review of Systems:      Pertinent positives and negatives discussed in HPI    Objective:     Intake/Output Summary (Last 24 hours) at 2024 1239  Last data filed at 2024 1002  Gross per 24 hour   Intake 513.47 ml   Output 1840 ml   Net -1326.53 ml        Vitals:   Vitals:    24 0903 24 0941 24 0948 24 1002   BP: (!)  02/27/24  0435 02/28/24  0443   * 135* 132*   K 4.1 5.0 4.5   CL 98* 97* 93*   CO2 26 28 30   BUN 5* 7 13   CREATININE <0.5* 1.0 0.7*   GLUCOSE 107* 149* 95       Hepatic:   Recent Labs     02/26/24  1040   AST 59*   ALT 41*   BILITOT <0.2   ALKPHOS 196*       Lipids: No results found for: \"CHOL\", \"HDL\", \"TRIG\"  Hemoglobin A1C:   Lab Results   Component Value Date/Time    LABA1C 5.2 07/26/2022 04:21 AM     TSH: No results found for: \"TSH\"  Troponin: No results found for: \"TROPONINT\"  Lactic Acid: No results for input(s): \"LACTA\" in the last 72 hours.  BNP: No results for input(s): \"PROBNP\" in the last 72 hours.  UA:  Lab Results   Component Value Date/Time    NITRU Negative 02/26/2024 09:35 PM    COLORU Straw 02/26/2024 09:35 PM    PHUR 6.0 02/26/2024 09:35 PM    CLARITYU Clear 02/26/2024 09:35 PM    SPECGRAV 1.010 02/26/2024 09:35 PM    LEUKOCYTESUR Negative 02/26/2024 09:35 PM    UROBILINOGEN 0.2 02/26/2024 09:35 PM    BILIRUBINUR Negative 02/26/2024 09:35 PM    BLOODU Negative 02/26/2024 09:35 PM    GLUCOSEU Negative 02/26/2024 09:35 PM    KETUA Negative 02/26/2024 09:35 PM     Urine Cultures:   Lab Results   Component Value Date/Time    LABURIN No growth at 18 to 36 hours 02/26/2024 09:35 PM     Blood Cultures:   Lab Results   Component Value Date/Time    BC  02/26/2024 08:06 PM     No Growth to date.  Any change in status will be called.     Lab Results   Component Value Date/Time    BLOODCULT2  02/26/2024 08:06 PM     No Growth to date.  Any change in status will be called.     Organism: No results found for: \"ORG\"      Electronically signed by Nigel Wilkins MD on 2/28/2024 at 12:39 PM

## 2024-02-28 NOTE — PROGRESS NOTES
02/28/24 0343   NIV Type   $NIV $Daily Charge   NIV Started/Stopped On   Equipment Type v60   Mode Bilevel   Mask Type Full face mask   Mask Size Large   Assessment   Pulse 83   Respirations 16   SpO2 94 %   Settings/Measurements   IPAP 20 cmH20   CPAP/EPAP 5 cmH2O   Vt (Measured) 910 mL   Rate Ordered 14   FiO2  50 %   Mask Leak (lpm) 36 lpm   Patient's Home Machine No   Alarm Settings   Alarms On Y   Low Pressure (cmH2O) 6 cmH2O   High Pressure (cmH2O) 30 cmH2O   Patient Observation   Observations mepilex on nose

## 2024-02-28 NOTE — PROGRESS NOTES
02/27/24 8556   NIV Type   NIV Started/Stopped On   Equipment Type v60   Mode Bilevel   Mask Type Full face mask   Mask Size Large   Assessment   Pulse 99   Respirations 18   SpO2 97 %   Alarm Settings   Alarms On Y   Low Pressure (cmH2O) 6 cmH2O   High Pressure (cmH2O) 30 cmH2O   Patient Observation   Observations mepilex on nose

## 2024-02-28 NOTE — PROGRESS NOTES
INPATIENT PULMONARY CRITICAL CARE PROGRESS NOTE      Reason for visit      alcohol intoxication, morbidly obese, undiagnosed sleep apnea with multiple apneic spells about to be using Ativan and phenobarb per CIWA protocol, high risk for decompensation     SUBJECTIVE: Patient patient when seen this morning was sleeping in the bed without the BiPAP and patient was having loud snoring, patient did use the BiPAP overnight, patient also has a Tmax of 100.1 °F, patient has sinus rhythm on the monitor ranging from normal sinus rhythm to sinus tachycardia, patient was on 4 L of nasal cannula oxygen sat 92% when seen this morning, patient has had good urine output, patient is off of nicardipine infusion and is maintaining hemodynamics without that and on home regimen of antihypertensives, patient has acute diffuse balance of -4.3 L, patient glycemic control was acceptable, no other pertinent review of system of concern         Physical Exam:  Blood pressure (!) 146/85, pulse (!) 101, temperature 100.1 °F (37.8 °C), temperature source Bladder, resp. rate 14, height 1.803 m (5' 11\"), weight (!) 207.4 kg (457 lb 3.7 oz), SpO2 91 %.'     Constitutional: No respiratory distress off BiPAP  HENT: No facial asymmetry no thyromegaly.  Eyes:  Conjunctivae are normal. Pupils equal, round, and reactive to light. No scleral icterus.   Neck: . No tracheal deviation present. No obvious thyroid mass.   Cardiovascular: Normal rate, regular rhythm, normal heart sounds.  No right ventricular heave.  Some lower extremity edema.  Pulmonary/Chest: No wheezes.  Bibasilar rales.  Chest wall is not dull to percussion.  Some accessory muscle usage or stridor.  Decreased breath sound intensity  Abdominal: Soft. Bowel sounds present.  Some distension, no hernia. No tenderness morbid obesity.    Musculoskeletal: No cyanosis. No clubbing. No obvious joint deformity.   Lymphadenopathy: No cervical or supraclavicular adenopathy.   Skin: Skin is warm and  nicardipine has been tapered to DC  Patient to be started on Lasix 40 mg IV push twice daily to continue  Patient has had good response with Lasix IV push  Patient may require repeat echocardiogram  Patient does have a history of alcohol use on a daily basis and patient will benefit from Precedex infusion especially in view that patient is symptomatic and also patient is on BiPAP and also patient has history of alcohol use and orders all placed  CIWA protocol is on board  Nicotine replacement therapy if the patient wants  Monitor input output and BMP  Correct electrolytes on whenever necessary basis  Of note is being changed to subcutaneous heparin for DVT which was increased to 7500 mg units 3 times daily for prophylaxis due to morbid obesity  Nicotine replacement therapy  PUD prophylaxis      Case discussed with patient and ICU team      Electronically signed by:  SARAH HAMILTON MD    2/28/2024    7:57 AM.

## 2024-02-28 NOTE — PROGRESS NOTES
02/27/24 2044   NIV Type   NIV Started/Stopped On   Equipment Type V60   Mode Bilevel   Mask Type Full face mask   Mask Size Large   Assessment   Pulse 86   Respirations 17   SpO2 91 %   Settings/Measurements   PIP Observed 21 cm H20   IPAP 20 cmH20   CPAP/EPAP 5 cmH2O   Vt (Measured) 593 mL   Rate Ordered 14   Insp Rise Time (%) 1 %   FiO2  50 %   Minute Volume (L/min) 7 Liters   Mask Leak (lpm) 3 lpm   Patient's Home Machine No   Alarm Settings   Alarms On Y   Low Pressure (cmH2O) 8 cmH2O   High Pressure (cmH2O) 30 cmH2O   Apnea (secs) 20 secs   RR Low (bpm) 6   RR High (bpm) 40 br/min

## 2024-02-29 LAB
ANION GAP SERPL CALCULATED.3IONS-SCNC: 8 MMOL/L (ref 3–16)
BASE EXCESS BLDA CALC-SCNC: 3.7 MMOL/L (ref -3–3)
BUN SERPL-MCNC: 8 MG/DL (ref 7–20)
CALCIUM SERPL-MCNC: 8.6 MG/DL (ref 8.3–10.6)
CHLORIDE SERPL-SCNC: 97 MMOL/L (ref 99–110)
CO2 BLDA-SCNC: 33.1 MMOL/L
CO2 SERPL-SCNC: 30 MMOL/L (ref 21–32)
COHGB MFR BLDA: 1.3 % (ref 0–1.5)
CREAT SERPL-MCNC: <0.5 MG/DL (ref 0.9–1.3)
DEPRECATED RDW RBC AUTO: 17.2 % (ref 12.4–15.4)
GFR SERPLBLD CREATININE-BSD FMLA CKD-EPI: >60 ML/MIN/{1.73_M2}
GLUCOSE SERPL-MCNC: 121 MG/DL (ref 70–99)
HCO3 BLDA-SCNC: 31.2 MMOL/L (ref 21–29)
HCT VFR BLD AUTO: 39.7 % (ref 40.5–52.5)
HGB BLD-MCNC: 12.7 G/DL (ref 13.5–17.5)
HGB BLDA-MCNC: 13.4 G/DL (ref 13.5–17.5)
MAGNESIUM SERPL-MCNC: 2.2 MG/DL (ref 1.8–2.4)
MCH RBC QN AUTO: 29.5 PG (ref 26–34)
MCHC RBC AUTO-ENTMCNC: 31.9 G/DL (ref 31–36)
MCV RBC AUTO: 92.4 FL (ref 80–100)
METHGB MFR BLDA: 0.2 %
O2 THERAPY: ABNORMAL
PCO2 BLDA: 60.6 MMHG (ref 35–45)
PH BLDA: 7.33 [PH] (ref 7.35–7.45)
PHOSPHATE SERPL-MCNC: 3.9 MG/DL (ref 2.5–4.9)
PLATELET # BLD AUTO: 288 K/UL (ref 135–450)
PMV BLD AUTO: 7.2 FL (ref 5–10.5)
PO2 BLDA: 92.1 MMHG (ref 75–108)
POTASSIUM SERPL-SCNC: 4.6 MMOL/L (ref 3.5–5.1)
RBC # BLD AUTO: 4.3 M/UL (ref 4.2–5.9)
SAO2 % BLDA: 96.8 %
SODIUM SERPL-SCNC: 135 MMOL/L (ref 136–145)
WBC # BLD AUTO: 7 K/UL (ref 4–11)

## 2024-02-29 PROCEDURE — 83735 ASSAY OF MAGNESIUM: CPT

## 2024-02-29 PROCEDURE — 2580000003 HC RX 258: Performed by: STUDENT IN AN ORGANIZED HEALTH CARE EDUCATION/TRAINING PROGRAM

## 2024-02-29 PROCEDURE — 94660 CPAP INITIATION&MGMT: CPT

## 2024-02-29 PROCEDURE — 6370000000 HC RX 637 (ALT 250 FOR IP): Performed by: INTERNAL MEDICINE

## 2024-02-29 PROCEDURE — 99233 SBSQ HOSP IP/OBS HIGH 50: CPT | Performed by: INTERNAL MEDICINE

## 2024-02-29 PROCEDURE — 6360000002 HC RX W HCPCS: Performed by: INTERNAL MEDICINE

## 2024-02-29 PROCEDURE — 36415 COLL VENOUS BLD VENIPUNCTURE: CPT

## 2024-02-29 PROCEDURE — 6370000000 HC RX 637 (ALT 250 FOR IP): Performed by: NURSE PRACTITIONER

## 2024-02-29 PROCEDURE — 6370000000 HC RX 637 (ALT 250 FOR IP): Performed by: PHYSICIAN ASSISTANT

## 2024-02-29 PROCEDURE — 2580000003 HC RX 258: Performed by: INTERNAL MEDICINE

## 2024-02-29 PROCEDURE — 6370000000 HC RX 637 (ALT 250 FOR IP): Performed by: HOSPITALIST

## 2024-02-29 PROCEDURE — 2000000000 HC ICU R&B

## 2024-02-29 PROCEDURE — 80048 BASIC METABOLIC PNL TOTAL CA: CPT

## 2024-02-29 PROCEDURE — 82803 BLOOD GASES ANY COMBINATION: CPT

## 2024-02-29 PROCEDURE — 94761 N-INVAS EAR/PLS OXIMETRY MLT: CPT

## 2024-02-29 PROCEDURE — 85027 COMPLETE CBC AUTOMATED: CPT

## 2024-02-29 PROCEDURE — 84100 ASSAY OF PHOSPHORUS: CPT

## 2024-02-29 PROCEDURE — 2700000000 HC OXYGEN THERAPY PER DAY

## 2024-02-29 PROCEDURE — 6370000000 HC RX 637 (ALT 250 FOR IP): Performed by: STUDENT IN AN ORGANIZED HEALTH CARE EDUCATION/TRAINING PROGRAM

## 2024-02-29 RX ORDER — LIDOCAINE 4 G/G
1 PATCH TOPICAL DAILY
Status: DISCONTINUED | OUTPATIENT
Start: 2024-02-29 | End: 2024-03-05 | Stop reason: HOSPADM

## 2024-02-29 RX ORDER — CARVEDILOL 6.25 MG/1
12.5 TABLET ORAL 2 TIMES DAILY WITH MEALS
Status: DISCONTINUED | OUTPATIENT
Start: 2024-02-29 | End: 2024-03-05 | Stop reason: HOSPADM

## 2024-02-29 RX ORDER — LIDOCAINE 4 G/G
1 PATCH TOPICAL DAILY
Status: DISCONTINUED | OUTPATIENT
Start: 2024-03-01 | End: 2024-02-29

## 2024-02-29 RX ADMIN — FUROSEMIDE 40 MG: 40 TABLET ORAL at 08:47

## 2024-02-29 RX ADMIN — HEPARIN SODIUM 7500 UNITS: 5000 INJECTION INTRAVENOUS; SUBCUTANEOUS at 21:57

## 2024-02-29 RX ADMIN — CARVEDILOL 6.25 MG: 6.25 TABLET, FILM COATED ORAL at 08:47

## 2024-02-29 RX ADMIN — CHLORDIAZEPOXIDE HYDROCHLORIDE 25 MG: 25 CAPSULE ORAL at 08:46

## 2024-02-29 RX ADMIN — ACETAMINOPHEN 325MG 650 MG: 325 TABLET ORAL at 21:52

## 2024-02-29 RX ADMIN — CHLORDIAZEPOXIDE HYDROCHLORIDE 25 MG: 25 CAPSULE ORAL at 21:13

## 2024-02-29 RX ADMIN — HYDRALAZINE HYDROCHLORIDE 50 MG: 50 TABLET, FILM COATED ORAL at 14:41

## 2024-02-29 RX ADMIN — FAMOTIDINE 20 MG: 20 TABLET ORAL at 08:46

## 2024-02-29 RX ADMIN — AMLODIPINE BESYLATE 10 MG: 5 TABLET ORAL at 08:46

## 2024-02-29 RX ADMIN — MUPIROCIN: 20 OINTMENT TOPICAL at 09:05

## 2024-02-29 RX ADMIN — LISINOPRIL 20 MG: 20 TABLET ORAL at 08:46

## 2024-02-29 RX ADMIN — HEPARIN SODIUM 7500 UNITS: 5000 INJECTION INTRAVENOUS; SUBCUTANEOUS at 05:47

## 2024-02-29 RX ADMIN — FUROSEMIDE 40 MG: 40 TABLET ORAL at 17:27

## 2024-02-29 RX ADMIN — DOXYCYCLINE HYCLATE 100 MG: 100 TABLET, COATED ORAL at 08:46

## 2024-02-29 RX ADMIN — CHLORDIAZEPOXIDE HYDROCHLORIDE 25 MG: 25 CAPSULE ORAL at 14:42

## 2024-02-29 RX ADMIN — HEPARIN SODIUM 7500 UNITS: 5000 INJECTION INTRAVENOUS; SUBCUTANEOUS at 14:30

## 2024-02-29 RX ADMIN — Medication 10 ML: at 21:13

## 2024-02-29 RX ADMIN — CEFTRIAXONE SODIUM 1000 MG: 1 INJECTION, POWDER, FOR SOLUTION INTRAMUSCULAR; INTRAVENOUS at 09:15

## 2024-02-29 RX ADMIN — Medication 100 MG: at 08:46

## 2024-02-29 RX ADMIN — CARVEDILOL 12.5 MG: 6.25 TABLET, FILM COATED ORAL at 17:27

## 2024-02-29 RX ADMIN — HYDRALAZINE HYDROCHLORIDE 50 MG: 50 TABLET, FILM COATED ORAL at 21:12

## 2024-02-29 RX ADMIN — FAMOTIDINE 20 MG: 20 TABLET ORAL at 21:13

## 2024-02-29 RX ADMIN — DOXYCYCLINE HYCLATE 100 MG: 100 TABLET, COATED ORAL at 21:13

## 2024-02-29 RX ADMIN — Medication 10 ML: at 09:06

## 2024-02-29 RX ADMIN — HYDRALAZINE HYDROCHLORIDE 50 MG: 50 TABLET, FILM COATED ORAL at 05:46

## 2024-02-29 ASSESSMENT — PAIN - FUNCTIONAL ASSESSMENT: PAIN_FUNCTIONAL_ASSESSMENT: PREVENTS OR INTERFERES SOME ACTIVE ACTIVITIES AND ADLS

## 2024-02-29 ASSESSMENT — PAIN SCALES - GENERAL
PAINLEVEL_OUTOF10: 2
PAINLEVEL_OUTOF10: 0
PAINLEVEL_OUTOF10: 6
PAINLEVEL_OUTOF10: 0

## 2024-02-29 ASSESSMENT — PAIN DESCRIPTION - FREQUENCY: FREQUENCY: INTERMITTENT

## 2024-02-29 ASSESSMENT — PAIN DESCRIPTION - DESCRIPTORS: DESCRIPTORS: ACHING;SORE

## 2024-02-29 ASSESSMENT — PAIN DESCRIPTION - PAIN TYPE: TYPE: ACUTE PAIN;CHRONIC PAIN

## 2024-02-29 ASSESSMENT — PAIN DESCRIPTION - ORIENTATION: ORIENTATION: LOWER

## 2024-02-29 ASSESSMENT — PAIN DESCRIPTION - ONSET: ONSET: GRADUAL

## 2024-02-29 ASSESSMENT — PAIN DESCRIPTION - LOCATION: LOCATION: BACK

## 2024-02-29 NOTE — ED PROVIDER NOTES
[facility] Emergency Department    CHIEF COMPLAINT  Cough, Shortness of Breath, Chest Pain (Respiratory sx for 2 weeks), and Depression (Severe depression since age 11. Pt states he is not suicidal but feels like death would be an easier way out. Currently doesn't see anyone for his depression)      SHARED SERVICE VISIT  I have seen and evaluated this patient with my supervising physician, Dr. Seamus Bergman.    HISTORY OF PRESENT ILLNESS  Mansoor Walters is a 28 y.o. male who presents to the ED with multiple complaints.  Has been experiencing nonproductive cough ongoing for approximately 2 weeks with intermittent shortness of breath and chest pain.  Respiratory symptoms ongoing for 2 weeks with chest pain ongoing today.  He is also experiencing severe depression which has been battling since the age of 11.  Currently does not feel suicidal but does feel like death would be an easier way out.  He is also an alcoholic who drinks a liter of vodka daily.  Last drink was approximately 7:30 PM last night.  He would like to detox and get help.  Has never been diagnosed with DTs or had withdrawal seizures.  Does have visible tremors at this time.  Also experiencing nausea with abdominal pain denies any vomiting.  Denies any headache, body ache, fevers or chills.  Denies any sneezing or sore throat.  Denies any vision changes or dizziness.  Denies any urinary symptoms.  Denies any diarrhea or bloody stools.  Denies any new onset back pain.  Denies any recent travel or sick contacts.  No other complaints, modifying factors or associated symptoms.     Nursing notes reviewed.   Past Medical History:   Diagnosis Date    Anxiety     Depression     Headache(784.0)     Substance abuse (HCC)      Past Surgical History:   Procedure Laterality Date    FRACTURE SURGERY      UPPER GASTROINTESTINAL ENDOSCOPY N/A 7/26/2022    EGD BIOPSY performed by Prachi Chau MD at [facility] ENDOSCOPY     Family History  Or    LORazepam (ATIVAN) injection 2 mg  2 mg IntraVENous Q1H PRN Yves Faith MD   2 mg at 02/28/24 0540    Or    LORazepam (ATIVAN) tablet 3 mg  3 mg Oral Q1H PRN Yves Faith MD        Or    LORazepam (ATIVAN) injection 3 mg  3 mg IntraVENous Q1H PRN Yves Faith MD        Or    LORazepam (ATIVAN) tablet 4 mg  4 mg Oral Q1H PRN Yves Faith MD        Or    LORazepam (ATIVAN) injection 4 mg  4 mg IntraVENous Q1H PRN Yves Faith MD        sodium chloride flush 0.9 % injection 5-40 mL  5-40 mL IntraVENous 2 times per day Yves Faith MD   10 mL at 02/28/24 2124    sodium chloride flush 0.9 % injection 5-40 mL  5-40 mL IntraVENous PRN Yves Faith MD        0.9 % sodium chloride infusion   IntraVENous PRN Yves Faith MD   Stopped at 02/27/24 1122    PHENobarbital (LUMINAL) injection 130 mg  130 mg IntraVENous Q15 Min PRN Yves Faith MD         Allergies   Allergen Reactions    Buspirone      Self harm thoughts  Other reaction(s): Other (See Comments)  Self harm thoughts    Paroxetine      Self harm thoughts       REVIEW OF SYSTEMS  10 systems reviewed, pertinent positives per HPI otherwise noted to be negative    PHYSICAL EXAM  BP (!) 149/79   Pulse 87   Temp 98.9 °F (37.2 °C) (Bladder)   Resp 19   Ht 1.803 m (5' 11\")   Wt (!) 207.4 kg (457 lb 3.7 oz)   SpO2 94%   BMI 63.77 kg/m²   GENERAL APPEARANCE: Awake and alert. Cooperative.  Visible tremors of hands and arms.  HEAD: Normocephalic. Atraumatic.  No herrera signs raccoon eyes.  EYES: PERRL. EOM's grossly intact.  No conjunctival injection or discharge.  No icterus.  ENT: No edema, erythema, or purulent discharge noted external auditory canals bilaterally.  TMs are clear and nonbulging.  Nose mucosa is slightly erythematous with clear discharge.  Slight posterior oropharynx erythema with clear PND.  Patent airway.  No tonsillar exudate or swelling.  NECK: Supple.  Full range of motion with no neck pain or stiffness.  HEART: RRR. No murmurs, rubs  Diagnosis       Sinus tachycardiaT wave abnormality, consider inferior ischemiaConfirmed by TIMOTHY VIEYRA MD ([id number]) on 2/27/2024 9:58:03 AM     I estimate there is a HIGH risk for ALCOHOL WITHDRAW, PNEUMONIA, and HYPOXIA, thus I consider the decision for admission reasonable. I estimate there is LOW risk for PULMONARY EMBOLISM, ACUTE CORONARY SYNDROME, OR THORACIC AORTIC DISSECTION.  Mansoor Walters and I have discussed the diagnosis and risks, and we agree with admission to the hospital.    FINAL Impression    1. Alcohol withdrawal syndrome without complication (HCC)    2. Pneumonia of right lung due to infectious organism, unspecified part of lung    3. Hypoxia        Blood pressure (!) 149/79, pulse 87, temperature 98.9 °F (37.2 °C), temperature source Bladder, resp. rate 19, height 1.803 m (5' 11\"), weight (!) 207.4 kg (457 lb 3.7 oz), SpO2 94 %.     DISPOSITION  Patient was admitted to the hospital in stable condition.         Timothy Bravo, JIM  02/29/24 0226

## 2024-02-29 NOTE — PROGRESS NOTES
Shift: [id number]     Admitting diagnosis: ARF with hypercapnia, HTN emergency, ETOH withdrawal and pulmonary congestion.     Presentation to hospital: increasing SOB    Surgery: no     Nursing assessment at handoff  stable    Emergency Contact/POA:Anthony, Father  Family updated: yes - at bedsode    Most recent vitals: BP (!) 170/81   Pulse 90   Temp 99 °F (37.2 °C) (Bladder)   Resp 16   Ht 1.803 m (5' 11\")   Wt (!) 200.8 kg (442 lb 10.9 oz)   SpO2 96%   BMI 61.74 kg/m²      Rhythm: Normal Sinus Rhythm      NC/HFNC- 2 lpm  Respiratory support: - No ventilator support  - BiPAP   IPAP: 20 cmH20, CPAP/EPAP: 5 cmH2O only when sleeping    Vent days: Day 0    Increased O2 requirements: no    Admission weight Weight - Scale: (!) 204.1 kg (450 lb)  Today's weight   Wt Readings from Last 1 Encounters:   02/29/24 (!) 200.8 kg (442 lb 10.9 oz)         UOP >30ml/hr: yes - 975 ml    Davis need assessed each shift: yes, remains in place    Restraints: no  Order current and documentation up to date?    Lines/Drains  LDA Insertion Date Discontinued Date Dressing Changes   PIV   2/28/24     TLC       Arterial       Davis   2/26/24     Vas Cath      ETT       Surgical drains        Night Shift Hospitalist Interventions    Problem(Brief) Date Time Intervention Physician contacted                                               Drip rates at handoff:    sodium chloride Stopped (02/27/24 1122)     Hospital Course Daily Updates:  Admit Day# 0  - Pt to wear BiPAP at night and when sleeping  - Repeat ABG in am on 6/28  - Weaned off Precedex and Nicardipine to PO meds  - Continue to monitor CiWA scores     Admit Day # 2 Nights 2/28/24  - Vital signs stable overnight. No complaints of pain  - Complete bath given. Redness noted on groin and abdominal folds.   - UOP = 2010 ml    Lab Data:   CBC:   Recent Labs     02/28/24  0443 02/29/24  0442   WBC 8.0 7.0   HGB 12.3* 12.7*   HCT 38.6* 39.7*   MCV 92.7 92.4    288     BMP:    Recent  Labs     02/28/24  0443 02/29/24  0442   * 135*   K 4.5 4.6   CO2 30 30   BUN 13 8   CREATININE 0.7* <0.5*     LIVR:   Recent Labs     02/26/24  1040   AST 59*   ALT 41*     PT/INR:   Recent Labs     02/26/24  1040   PROT 6.8     APTT: No results for input(s): \"APTT\" in the last 72 hours.  ABG:   Recent Labs     02/28/24  0512 02/29/24  0543   PHART 7.325* 7.330*   [id number] 53.6* 60.6*   PO2ART 59.4* 92.1     Consults (if GI or Nephrology- which group?)-  Psychiatry, Critical Care

## 2024-02-29 NOTE — PROGRESS NOTES
02/28/24 2135   NIV Type   NIV Started/Stopped On   Equipment Type V60   Mode Bilevel   Mask Type Full face mask   Mask Size Large   Assessment   Pulse 88   Respirations 20   SpO2 98 %   Comfort Level Good   Using Accessory Muscles No   Mask Compliance Good   Skin Assessment Clean, dry, & intact   Skin Protection for O2 Device Yes   Location Nose   Intervention(s) Skin Barrier   Breath Sounds   Right Upper Lobe Diminished   Right Middle Lobe Diminished   Right Lower Lobe Diminished   Left Upper Lobe Diminished   Left Lower Lobe Diminished   Settings/Measurements   IPAP 20 cmH20   CPAP/EPAP 5 cmH2O   Vt (Measured) 663 mL   Rate Ordered 14   FiO2  50 %   Minute Volume (L/min) 14.2 Liters   Mask Leak (lpm) 18 lpm   Patient's Home Machine No   Alarm Settings   Alarms On Y

## 2024-02-29 NOTE — PROGRESS NOTES
02/29/24 1537   NIV Type   NIV Started/Stopped On   Equipment Type v60   Mode Bilevel   Mask Type Full face mask   Mask Size Large   Assessment   Pulse 84   Respirations 19   SpO2 96 %   Breath Sounds   Right Upper Lobe Diminished   Right Middle Lobe Diminished   Right Lower Lobe Diminished   Left Upper Lobe Diminished   Left Lower Lobe Diminished   Settings/Measurements   PIP Observed 20 cm H20   IPAP 20 cmH20   CPAP/EPAP 5 cmH2O   Vt (Measured) 800 mL   Rate Ordered 14   FiO2  40 %   I Time/ I Time % 1 s   Minute Volume (L/min) 12 Liters   Mask Leak (lpm) 40 lpm   Alarm Settings   Alarms On Y   Low Pressure (cmH2O) 35 cmH2O   High Pressure (cmH2O) 8 cmH2O   Delay Alarm 20 sec(s)   RR Low (bpm) 6   RR High (bpm) 40 br/min     Spoke with Panchito Muir. He said to hold off any overnight pulse oximetry at this time.  Patient drops Spo2's to 40's with frequent apnea on oxygen 4lpm whenever asleep. Placed to BIPAP due to excessive daytime speepiness.and spo2 40's.

## 2024-02-29 NOTE — PROGRESS NOTES
INPATIENT PULMONARY CRITICAL CARE PROGRESS NOTE      Reason for visit      alcohol intoxication, morbidly obese, undiagnosed sleep apnea with multiple apneic spells about to be using Ativan and phenobarb per CIWA protocol, high risk for decompensation     SUBJECTIVE: Patient when seen this morning was lying in the bed and trying to do incentive spirometry, patient still has some shortness of breath, patient also states that he is having difficulty in using the BiPAP but he also understands that he has to use it otherwise he will have increasing morbidity mortality, patient was having a Tmax of 99.2 °F, patient blood pressure was on the higher side when seen this morning and patient has been on oral antihypertensives and not on any nicardipine infusion, patient has sinus rhythm on the monitor, patient was on 4 L of nasal cannula oxygen assessment 94% and evaluation, patient's urine output has been adequate patient's cumulative fluid balance of -6.2 L, no other pertinent review of system of concern      Physical Exam:  Blood pressure (!) 170/81, pulse 90, temperature 99 °F (37.2 °C), temperature source Bladder, resp. rate 16, height 1.803 m (5' 11\"), weight (!) 200.8 kg (442 lb 10.9 oz), SpO2 96 %.'     Constitutional: No respiratory distress on nasal cannula oxygen  HENT: No facial asymmetry no thyromegaly.  Eyes:  Conjunctivae are normal. Pupils equal, round, and reactive to light. No scleral icterus.   Neck: . No tracheal deviation present. No obvious thyroid mass.   Cardiovascular: Normal rate, regular rhythm, normal heart sounds.  No right ventricular heave.  Minimal lower extremity edema.  Pulmonary/Chest: No wheezes.  Bibasilar decreased rales.  Chest wall is not dull to percussion.  Some accessory muscle usage or stridor.  Decreased breath sound intensity  Abdominal: Soft. Bowel sounds present.  Some distension, no hernia. No tenderness morbid obesity.    Musculoskeletal: No cyanosis. No clubbing. No obvious  joint deformity.   Lymphadenopathy: No cervical or supraclavicular adenopathy.   Skin: Skin is warm and dry. No rash or nodules on the exposed extremities.  Some stasis changes in the lower extremities  Neurologic: Alert and communicative when seen    Results:  CBC:   Recent Labs     02/27/24  0435 02/28/24 0443 02/29/24  0442   WBC 10.3 8.0 7.0   HGB 12.8* 12.3* 12.7*   HCT 40.2* 38.6* 39.7*   MCV 92.4 92.7 92.4    282 288       BMP:   Recent Labs     02/27/24  0435 02/28/24 0443 02/29/24  0442   * 132* 135*   K 5.0 4.5 4.6   CL 97* 93* 97*   CO2 28 30 30   PHOS 4.4 4.4 3.9   BUN 7 13 8   CREATININE 1.0 0.7* <0.5*       LIVER PROFILE:   Recent Labs     02/26/24  1040   AST 59*   ALT 41*   BILITOT <0.2   ALKPHOS 196*         UA:  Recent Labs     02/26/24  2135   COLORU Straw   PHUR 6.0   CLARITYU Clear   SPECGRAV 1.010   LEUKOCYTESUR Negative   UROBILINOGEN 0.2   BILIRUBINUR Negative   BLOODU Negative   GLUCOSEU Negative         Imaging:  I have reviewed radiology images personally.    XR CHEST PORTABLE   Final Result   1. There is mild apparent widening of the mediastinum, may be projectional in   nature.   2. Low lung volumes and patchy bibasilar opacities.  Multiple nodular   densities primarily in the right lung, difficult to ascertain whether these   represent nodules are vessels en [place].   3. Recommend repeat radiograph with max inspiration and lateral views.         CT ABDOMEN PELVIS W IV CONTRAST Additional Contrast? None   Final Result   Patchy pneumonia at the lung bases      No acute intra-abdominal abnormality         XR CHEST (2 VW)   Final Result   Mild cardiomegaly.  Prominence of the pulmonary vasculature could represent   pulmonary vascular congestion.      No discrete focal lung consolidation is seen.             Assessment:  Active Problems:    Acute respiratory failure with hypercapnia (HCC)    Pulmonary venous congestion    Hypertensive emergency    Morbid obesity with BMI of  basis  Of note is being changed to subcutaneous heparin for DVT which was increased to 7500 mg units 3 times daily for prophylaxis due to morbid obesity  Nicotine replacement therapy  PUD prophylaxis      Case discussed with patient and ICU team    Patient can be transferred out of the ICU to a stepdown unit from pulmonary/critical care standpoint of view      Electronically signed by:  SARAH HAMILTON MD    2/29/2024    7:53 AM.

## 2024-02-29 NOTE — PROGRESS NOTES
02/29/24 1554   Assessment   Pulse 80   Respirations 18   SpO2 95 %   Settings/Measurements   IPAP 20 cmH20   CPAP/EPAP (S)  10 cmH2O   Vt (Measured) 900 mL

## 2024-02-29 NOTE — PROGRESS NOTES
02/29/24 1554   Assessment   Pulse 80   Respirations 18   SpO2 95 %   Settings/Measurements   IPAP 22 cmH20   CPAP/EPAP (S)  10 cmH2O   Vt (Measured) 900 mL     Spoke with Dr Muir concerning increasing EPAP due to continued retractions snoring on EPAP of 5.

## 2024-02-29 NOTE — PROGRESS NOTES
V2.0    [facility] [place]      Name:  Mansoor Walters /Age/Sex: 1995  (28 y.o. male)   MRN & CSN:  [id number] & [id number] Encounter Date/Time: 2024 12:23 PM EST   Location:  [facility] PCP: No primary care provider on file.     Attending:Nigel Wilkins MD       Hospital Day: 4    Assessment and Recommendations     Patient is a 28-year-old morbidly obese male past medical history of alcohol abuse, GERD, hypertension, depression, who was admitted for acute alcohol intoxication pending withdrawal and acute hypoxic and hypercapnic respiratory failure.    #.  Acute alcohol withdrawal: improving.    IV thiamine and continue folic acid multivitamin  Continue Ativan per [facility] protocol   counseled on alcohol abstinence      #.  Acute hypoxic and hypercapnic respiratory failure  Multifactorial likely due to obesity hypoventilation syndrome, underlying sleep apnea,  Patient has been successfully weaned off to nasal cannula from BiPAP  Bronchodilators  Continue empiric antibiotics  Patient needs to have have home Bipap/CPAP arranged due to sleep apnea and OHS. Overnight pulsox study pending.     #.  Hypertensive urgency with history of underlying uncontrolled hypertension: improving.   Continue lisinopril, amlodipine, IV Lasix  Echo reviewed   Coreg and hydralazine 3 times daily  Nicardapine ggt wean off.     #/History of depression    #.  Morbid obesity  Patient was strictly counseled on dietary and lifestyle modifications        DVT Prophylaxis: Lovenox.   Code Status: Total Support.   Barrier to DC: hypoxia.         Subjective:     Patient was seen and examined today.  He states he is feeling better.  No acute events overnight.  Patient kareem afebrile with stable vital signs.    Review of Systems:      Pertinent positives and negatives discussed in HPI    Objective:     Intake/Output Summary (Last 24 hours) at 2024 1352  Last data filed at 2024 0900  Gross per 24 hour   Intake 528.53 ml   Output  performed with the administration of intravenous contrast. Multiplanar reformatted images are provided for review. Automated exposure control, iterative reconstruction, and/or weight based adjustment of the mA/kV was utilized to reduce the radiation dose to as low as reasonably achievable. COMPARISON: July 2022 HISTORY: ORDERING SYSTEM PROVIDED HISTORY: abdominal pain with N/V TECHNOLOGIST PROVIDED HISTORY: Reason for exam:->abdominal pain with N/V Additional Contrast?->None Decision Support Exception - unselect if not a suspected or confirmed emergency medical condition->Emergency Medical Condition (MA) Reason for Exam: epigastric pain x 2 days-cough Additional signs and symptoms: denies n/v Relevant Medical/Surgical History: no surg FINDINGS: Lower Chest: Patchy nodular consolidative changes seen at the lung bases.  No pleural effusions. Organs: No splenomegaly. Adrenal glands unremarkable. No hydronephrosis on right.  No hydronephrosis on left No pancreatic calcification.  No peripancreatic fluid. Low attenuation seen in the liver, suggesting fatty infiltration. Gallbladder is contracted, accentuating its wall thickness. No pancreatic calcification noted.  No peripancreatic fluid GI/Bowel: No significant small bowel distention noted.  Mild stool load seen in colon.  No bowel obstruction noted.  No significant small or large bowel wall thickening.  Appendix is  identified and normal in caliber. Pelvis: No free fluid in pelvis.  No pelvic adenopathy.  Small inguinal nodes are seen bilaterally, similar to prior, Likely reactive Peritoneum/Retroperitoneum: No retroperitoneal adenopathy.  No retroperitoneal hematoma. Bones/Soft Tissues: Spurring is seen in the spine.  Spurring is seen in the hips.  Tiny periumbilical hernia containing fat is seen     Patchy pneumonia at the lung bases No acute intra-abdominal abnormality       CBC:   Recent Labs     02/27/24  0435 02/28/24  0443 02/29/24  0442   WBC 10.3 8.0 7.0   HGB

## 2024-03-01 DIAGNOSIS — G47.33 OSA (OBSTRUCTIVE SLEEP APNEA): Primary | ICD-10-CM

## 2024-03-01 LAB
ANION GAP SERPL CALCULATED.3IONS-SCNC: 11 MMOL/L (ref 3–16)
BACTERIA BLD CULT ORG #2: NORMAL
BACTERIA BLD CULT: NORMAL
BUN SERPL-MCNC: 10 MG/DL (ref 7–20)
CALCIUM SERPL-MCNC: 9 MG/DL (ref 8.3–10.6)
CHLORIDE SERPL-SCNC: 94 MMOL/L (ref 99–110)
CO2 SERPL-SCNC: 32 MMOL/L (ref 21–32)
CREAT SERPL-MCNC: <0.5 MG/DL (ref 0.9–1.3)
DEPRECATED RDW RBC AUTO: 17.1 % (ref 12.4–15.4)
GFR SERPLBLD CREATININE-BSD FMLA CKD-EPI: >60 ML/MIN/{1.73_M2}
GLUCOSE SERPL-MCNC: 114 MG/DL (ref 70–99)
HCT VFR BLD AUTO: 40.1 % (ref 40.5–52.5)
HGB BLD-MCNC: 13 G/DL (ref 13.5–17.5)
MCH RBC QN AUTO: 29.8 PG (ref 26–34)
MCHC RBC AUTO-ENTMCNC: 32.5 G/DL (ref 31–36)
MCV RBC AUTO: 91.6 FL (ref 80–100)
PHOSPHATE SERPL-MCNC: 4.3 MG/DL (ref 2.5–4.9)
PLATELET # BLD AUTO: 331 K/UL (ref 135–450)
PMV BLD AUTO: 7.4 FL (ref 5–10.5)
POTASSIUM SERPL-SCNC: 4.5 MMOL/L (ref 3.5–5.1)
RBC # BLD AUTO: 4.37 M/UL (ref 4.2–5.9)
SODIUM SERPL-SCNC: 137 MMOL/L (ref 136–145)
WBC # BLD AUTO: 6.9 K/UL (ref 4–11)

## 2024-03-01 PROCEDURE — 6370000000 HC RX 637 (ALT 250 FOR IP): Performed by: NURSE PRACTITIONER

## 2024-03-01 PROCEDURE — 6370000000 HC RX 637 (ALT 250 FOR IP): Performed by: HOSPITALIST

## 2024-03-01 PROCEDURE — 36415 COLL VENOUS BLD VENIPUNCTURE: CPT

## 2024-03-01 PROCEDURE — 80048 BASIC METABOLIC PNL TOTAL CA: CPT

## 2024-03-01 PROCEDURE — 6370000000 HC RX 637 (ALT 250 FOR IP): Performed by: PHYSICIAN ASSISTANT

## 2024-03-01 PROCEDURE — 2580000003 HC RX 258: Performed by: INTERNAL MEDICINE

## 2024-03-01 PROCEDURE — 6370000000 HC RX 637 (ALT 250 FOR IP): Performed by: INTERNAL MEDICINE

## 2024-03-01 PROCEDURE — 94761 N-INVAS EAR/PLS OXIMETRY MLT: CPT

## 2024-03-01 PROCEDURE — 2700000000 HC OXYGEN THERAPY PER DAY

## 2024-03-01 PROCEDURE — 2580000003 HC RX 258: Performed by: STUDENT IN AN ORGANIZED HEALTH CARE EDUCATION/TRAINING PROGRAM

## 2024-03-01 PROCEDURE — 6360000002 HC RX W HCPCS: Performed by: INTERNAL MEDICINE

## 2024-03-01 PROCEDURE — 2060000000 HC ICU INTERMEDIATE R&B

## 2024-03-01 PROCEDURE — 84100 ASSAY OF PHOSPHORUS: CPT

## 2024-03-01 PROCEDURE — 99233 SBSQ HOSP IP/OBS HIGH 50: CPT | Performed by: INTERNAL MEDICINE

## 2024-03-01 PROCEDURE — 85027 COMPLETE CBC AUTOMATED: CPT

## 2024-03-01 PROCEDURE — 94660 CPAP INITIATION&MGMT: CPT

## 2024-03-01 RX ORDER — CHLORDIAZEPOXIDE HYDROCHLORIDE 5 MG/1
10 CAPSULE, GELATIN COATED ORAL 3 TIMES DAILY
Status: DISCONTINUED | OUTPATIENT
Start: 2024-03-01 | End: 2024-03-05 | Stop reason: HOSPADM

## 2024-03-01 RX ADMIN — HYDRALAZINE HYDROCHLORIDE 50 MG: 50 TABLET, FILM COATED ORAL at 06:31

## 2024-03-01 RX ADMIN — Medication 10 ML: at 09:02

## 2024-03-01 RX ADMIN — CHLORDIAZEPOXIDE HYDROCHLORIDE 10 MG: 5 CAPSULE ORAL at 14:16

## 2024-03-01 RX ADMIN — CHLORDIAZEPOXIDE HYDROCHLORIDE 10 MG: 5 CAPSULE ORAL at 20:16

## 2024-03-01 RX ADMIN — FAMOTIDINE 20 MG: 20 TABLET ORAL at 09:01

## 2024-03-01 RX ADMIN — HEPARIN SODIUM 7500 UNITS: 5000 INJECTION INTRAVENOUS; SUBCUTANEOUS at 14:12

## 2024-03-01 RX ADMIN — HEPARIN SODIUM 7500 UNITS: 5000 INJECTION INTRAVENOUS; SUBCUTANEOUS at 06:30

## 2024-03-01 RX ADMIN — HYDRALAZINE HYDROCHLORIDE 50 MG: 50 TABLET, FILM COATED ORAL at 14:16

## 2024-03-01 RX ADMIN — CARVEDILOL 12.5 MG: 6.25 TABLET, FILM COATED ORAL at 16:52

## 2024-03-01 RX ADMIN — AMLODIPINE BESYLATE 10 MG: 5 TABLET ORAL at 09:01

## 2024-03-01 RX ADMIN — FUROSEMIDE 40 MG: 40 TABLET ORAL at 09:01

## 2024-03-01 RX ADMIN — FAMOTIDINE 20 MG: 20 TABLET ORAL at 20:16

## 2024-03-01 RX ADMIN — CARVEDILOL 12.5 MG: 6.25 TABLET, FILM COATED ORAL at 09:01

## 2024-03-01 RX ADMIN — HEPARIN SODIUM 7500 UNITS: 5000 INJECTION INTRAVENOUS; SUBCUTANEOUS at 20:19

## 2024-03-01 RX ADMIN — Medication 100 MG: at 09:01

## 2024-03-01 RX ADMIN — CEFTRIAXONE SODIUM 1000 MG: 1 INJECTION, POWDER, FOR SOLUTION INTRAMUSCULAR; INTRAVENOUS at 09:07

## 2024-03-01 RX ADMIN — MUPIROCIN: 20 OINTMENT TOPICAL at 09:01

## 2024-03-01 RX ADMIN — CHLORDIAZEPOXIDE HYDROCHLORIDE 25 MG: 25 CAPSULE ORAL at 09:01

## 2024-03-01 RX ADMIN — FUROSEMIDE 40 MG: 40 TABLET ORAL at 16:52

## 2024-03-01 RX ADMIN — LISINOPRIL 20 MG: 20 TABLET ORAL at 09:01

## 2024-03-01 RX ADMIN — HYDRALAZINE HYDROCHLORIDE 50 MG: 50 TABLET, FILM COATED ORAL at 20:16

## 2024-03-01 ASSESSMENT — PAIN SCALES - GENERAL
PAINLEVEL_OUTOF10: 0
PAINLEVEL_OUTOF10: 0
PAINLEVEL_OUTOF10: 2
PAINLEVEL_OUTOF10: 4

## 2024-03-01 ASSESSMENT — PAIN DESCRIPTION - ORIENTATION: ORIENTATION: OTHER (COMMENT)

## 2024-03-01 ASSESSMENT — PAIN DESCRIPTION - LOCATION: LOCATION: HEAD

## 2024-03-01 NOTE — PROGRESS NOTES
V2.0    [facility] [place]      Name:  Mansoor Walters /Age/Sex: 1995  (28 y.o. male)   MRN & CSN:  [id number] & [id number] Encounter Date/Time: 3/1/2024 5:53 PM EST   Location:  [facility] PCP: No primary care provider on file.     Attending:Nikolay Jensen MD       Hospital Day: 5    Assessment and Recommendations       Assessment/Plan:     Acute hypoxic and hypercapnic respiratory failure:  Felt to be multifactorial including obesity with hypoventilation syndrome underlying sleep apnea as well.  Patient has been weaned off from BiPAP to nasal cannula  Reviewed note from pulmonology  Continue to monitor sats goal form 90 to 94%    Hypertensive emergency:  Patient off drips  Monitor blood pressure  Continue lisinopri 20 mg l, amlodipine 10 mg, and Lasix 40 mg p.o. twice daily  Coreg 12.5 mg twice daily and hydralazine 50 mg every 8 hours  Most recent blood pressure 148/85    Alcohol withdrawal:  Patient on CIWA protocol and has not scored high enough to even require any Ativan at this point  Continue Librium 10 mg 3 times a day  Patient is interested in getting some help  Discussed the case with case management today, [facility] inpatient unit is going to do a phone assessment for alcohol treatment.    History of depression:  History of medical noncompliance with this.    Morbid obesity:      Tobacco abuse:  Currently has nicotine patch  Encouraged to stop smoking    Diet ADULT DIET; Regular; Low Sodium (2 gm)   DVT Prophylaxis [] Lovenox, [x]  Heparin, [] SCDs, [] Ambulation,  [] Eliquis, [] Xarelto  [] Coumadin   Code Status Full Code       Surrogate Decision Maker/ POA      Personally reviewed Lab Studies and Imaging             Subjective:     Chief Complaint: Community-acquired bacterial pneumonia    28-year-old male with past medical history of alcohol use disorder anxiety depression presented to the hospital because of respiratory symptoms going on for the last 2 weeks.  Symptoms of  pulmonary vascular congestion. No discrete focal lung consolidation is seen.     XR CHEST PORTABLE    Result Date: 2/26/2024  EXAMINATION: ONE XRAY VIEW OF THE CHEST 2/26/2024 1:59 pm COMPARISON: CXR dated 02/26/2024 HISTORY: ORDERING SYSTEM PROVIDED HISTORY: sob TECHNOLOGIST PROVIDED HISTORY: Reason for exam:->sob Reason for Exam: sob FINDINGS: There is mild apparent widening of the mediastinum, may be projectional in nature. Low lung volumes and patchy bibasilar opacities.  Multiple nodular densities primarily in the right lung, difficult to ascertain whether these represent nodules are vessels en [place]. Recommend repeat radiograph with max inspiration and lateral views. No pneumothorax or pleural effusion.     1. There is mild apparent widening of the mediastinum, may be projectional in nature. 2. Low lung volumes and patchy bibasilar opacities.  Multiple nodular densities primarily in the right lung, difficult to ascertain whether these represent nodules are vessels en [place]. 3. Recommend repeat radiograph with max inspiration and lateral views.     CT ABDOMEN PELVIS W IV CONTRAST Additional Contrast? None    Result Date: 2/26/2024  EXAMINATION: CT OF THE ABDOMEN AND PELVIS WITH CONTRAST 2/26/2024 12:41 pm TECHNIQUE: CT of the abdomen and pelvis was performed with the administration of intravenous contrast. Multiplanar reformatted images are provided for review. Automated exposure control, iterative reconstruction, and/or weight based adjustment of the mA/kV was utilized to reduce the radiation dose to as low as reasonably achievable. COMPARISON: July 2022 HISTORY: ORDERING SYSTEM PROVIDED HISTORY: abdominal pain with N/V TECHNOLOGIST PROVIDED HISTORY: Reason for exam:->abdominal pain with N/V Additional Contrast?->None Decision Support Exception - unselect if not a suspected or confirmed emergency medical condition->Emergency Medical Condition (MA) Reason for Exam: epigastric pain x 2 days-cough Additional signs

## 2024-03-01 NOTE — CARE COORDINATION
New to C4 tx from ICU. Need BiPAP for home per RN and Dr. Muir. No preference in DME provider. Per ICU [facility] is going to do a phone assessment for alcohol treatment.

## 2024-03-01 NOTE — PLAN OF CARE
Problem: Discharge Planning  Goal: Discharge to home or other facility with appropriate resources  Outcome: Progressing  Flowsheets (Taken 3/1/2024 1200)  Discharge to home or other facility with appropriate resources: Identify barriers to discharge with patient and caregiver     Problem: Pain  Goal: Verbalizes/displays adequate comfort level or baseline comfort level  Outcome: Progressing  Flowsheets  Taken 3/1/2024 1645  Verbalizes/displays adequate comfort level or baseline comfort level: Encourage patient to monitor pain and request assistance  Taken 3/1/2024 1400  Verbalizes/displays adequate comfort level or baseline comfort level: Encourage patient to monitor pain and request assistance  Taken 3/1/2024 1200  Verbalizes/displays adequate comfort level or baseline comfort level: Encourage patient to monitor pain and request assistance     Problem: Safety - Adult  Goal: Free from fall injury  Outcome: Progressing  Flowsheets (Taken 3/1/2024 1200)  Free From Fall Injury: Instruct family/caregiver on patient safety

## 2024-03-01 NOTE — CARE COORDINATION
Met with the pt and his parents. We discussed whether the pt was interested in inpt vs outpt treatment for his ETOH addiction. During the conversation, it came up that the pt has a psych history and his parents have tried getting him help since the 6th grade, according to the parents. They said he wouldn't like the medicine and wouldn't tell the doctor to try something new and just quit. Pt. did mention being suicidal before.  Tension grew between the father and the pt. I de- escalated the conversation and came back to the pt wanting to go  to inpt rehab for ETOH withdraw and mental help. The family did  not have any preferences. They decided the closer the better and agreed to [facility] and [facility]. Writer called [facility] and reviewed the situation with them. They will review and let us know. Writer called [facility] and left a vm. The pt is in agreement to go home with parents for awhile either way.     WHile with the pt and parents we called [facility] and tentatively  scheduled an overnight sleep study for  March 18th.  The pt is currently on O2 @ 4l/nc and Dr Muir is arranging for the pt to leave with a Cpap for his signs of severe sleep apnea.  Pt will need a walk test to get home O2.     Will follow up

## 2024-03-01 NOTE — PROGRESS NOTES
03/01/24 0827   NIV Type   $NIV $Daily Charge   NIV Started/Stopped On   Equipment Type v60   Mode Bilevel   Mask Type Full face mask   Mask Size Large   Assessment   Pulse 80   Heart Rate Source Monitor   Respirations 16   SpO2 100 %   Level of Consciousness 0   Comfort Level Fair   Using Accessory Muscles No   Mask Compliance Good   Skin Assessment Clean, dry, & intact   Skin Protection for O2 Device Yes   Orientation Middle   Location Nose   Settings/Measurements   PIP Observed 15 cm H20   IPAP 20 cmH20   CPAP/EPAP 10 cmH2O   Vt (Measured) 948 mL   Rate Ordered 14   Insp Rise Time (%) 3 %   FiO2  40 %   I Time/ I Time % 1 s   Minute Volume (L/min) 14.2 Liters   Mask Leak (lpm) 19 lpm   Patient's Home Machine No   Alarm Settings   Alarms On Y   Low Pressure (cmH2O) 8 cmH2O   High Pressure (cmH2O) 35 cmH2O   Apnea (secs) 20 secs   RR Low (bpm) 8   RR High (bpm) 40 br/min

## 2024-03-01 NOTE — CARE COORDINATION
Whether pt goes straight to rehab unit or home, he is staying with his parents at     [id number] [place] [id number]

## 2024-03-01 NOTE — PROGRESS NOTES
INPATIENT PULMONARY CRITICAL CARE PROGRESS NOTE      Reason for visit      alcohol intoxication, morbidly obese, undiagnosed sleep apnea with multiple apneic spells about to be using Ativan and phenobarb per CIWA protocol, high risk for decompensation     SUBJECTIVE: Patient when seen this morning was sleeping in the bed on BiPAP, patient did not have any increased work of breathing, patient was requiring 40% oxygen on the BiPAP to maintain oxygen saturation which was 95% when seen, patient is diastolic blood pressure was slightly on the higher side, patient has been afebrile and hemodynamically maintained, patient's urine output has been adequate, patient's p.o. intake is limited, patient has a cumulative fluid balance of -9.6 L, patient's glycemic control was acceptable, no other review of system could be obtained because of patient clinical status      Physical Exam:  Blood pressure (!) 157/92, pulse 66, temperature 99 °F (37.2 °C), temperature source Bladder, resp. rate 15, height 1.803 m (5' 11\"), weight (!) 200.8 kg (442 lb 10.9 oz), SpO2 97 %.'     Constitutional: No respiratory distress on BiPAP  HENT: BiPAP mask intact no thyromegaly.  Eyes:  Conjunctivae are normal. Pupils equal, round, and reactive to light. No scleral icterus.   Neck: . No tracheal deviation present. No obvious thyroid mass.   Cardiovascular: Normal rate, regular rhythm, normal heart sounds.  No right ventricular heave.  Minimal lower extremity edema.  Pulmonary/Chest: No wheezes.  Bibasilar decreased rales.  Chest wall is not dull to percussion.  No accessory muscle usage or stridor.  Decreased breath sound intensity  Abdominal: Soft. Bowel sounds present.  Some distension, no hernia. No tenderness morbid obesity.    Musculoskeletal: No cyanosis. No clubbing. No obvious joint deformity.   Lymphadenopathy: No cervical or supraclavicular adenopathy.   Skin: Skin is warm and dry. No rash or nodules on the exposed extremities.  Some stasis  the patient the CPAP/BiPAP from the hospital as patient has severe sleep apnea with nocturnal hypoxemia and without the machine patient may have worsening of clinical status and likely hospitalization and after discussion-a sleep study in the lab being arranged for the patient on March 18  [employer] will see if they can arrange for a BiPAP for the patient from the hospital after discussing with the corporate office  Pulmonary toilet  Patient does have some pulm infiltrates on imaging in the bases and patient is not spiking fever and for that reason patient's Rocephin and doxycycline was reintroduced and further titration as per clinical status and cultures  Trend the fever curve-patient was afebrile this morning  Patient does appear to have some pulmonary venous congestion clinically as well as on the base of the data available  Patient has been started on Cardene drip by the admitting provider which can be continued and titration as per patient's clinical status and hemodynamics along with cardiac rhythm  Patient's oral antihypertensive has been started and patient nicardipine has been tapered to DC  Patient to be started on Lasix 40 mg IV push twice daily to continue  Patient has had good response with Lasix IV push  Patient may require repeat echocardiogram  Patient does not need any Precedex infusion  CIWA protocol is in place  Nicotine replacement therapy if the patient wants  Monitor input output and BMP  Correct electrolytes on whenever necessary basis  Of note is being changed to subcutaneous heparin for DVT which was increased to 7500 mg units 3 times daily for prophylaxis due to morbid obesity  Nicotine replacement therapy  PUD prophylaxis      Case discussed with ICU team and case management  Case discussed with DME company representative        Electronically signed by:  SARAH HAMILTON MD    3/1/2024    7:03 AM.

## 2024-03-01 NOTE — PROGRESS NOTES
02/29/24 1328   NIV Type   $NIV $Daily Charge   Equipment Type V60   Mode Bilevel   Mask Type Full face mask   Mask Size Large   Assessment   Respirations 26   SpO2 97 %   Comfort Level Good   Using Accessory Muscles No   Mask Compliance Good   Skin Assessment Clean, dry, & intact   Skin Protection for O2 Device Yes   Location Nose   Intervention(s) Skin Barrier   Breath Sounds   Right Upper Lobe Diminished   Right Middle Lobe Diminished   Right Lower Lobe Diminished   Left Upper Lobe Diminished   Left Lower Lobe Diminished   Settings/Measurements   IPAP 20 cmH20   CPAP/EPAP 10 cmH2O   Vt (Measured) 684 mL   FiO2  40 %   Minute Volume (L/min) 17.9 Liters   Mask Leak (lpm) 44 lpm   Patient's Home Machine No   Alarm Settings   Alarms On Y

## 2024-03-02 PROCEDURE — 2060000000 HC ICU INTERMEDIATE R&B

## 2024-03-02 PROCEDURE — 97161 PT EVAL LOW COMPLEX 20 MIN: CPT

## 2024-03-02 PROCEDURE — 2700000000 HC OXYGEN THERAPY PER DAY

## 2024-03-02 PROCEDURE — 94660 CPAP INITIATION&MGMT: CPT

## 2024-03-02 PROCEDURE — 97165 OT EVAL LOW COMPLEX 30 MIN: CPT

## 2024-03-02 PROCEDURE — 2580000003 HC RX 258: Performed by: STUDENT IN AN ORGANIZED HEALTH CARE EDUCATION/TRAINING PROGRAM

## 2024-03-02 PROCEDURE — 6370000000 HC RX 637 (ALT 250 FOR IP): Performed by: HOSPITALIST

## 2024-03-02 PROCEDURE — 6370000000 HC RX 637 (ALT 250 FOR IP): Performed by: NURSE PRACTITIONER

## 2024-03-02 PROCEDURE — 99232 SBSQ HOSP IP/OBS MODERATE 35: CPT | Performed by: INTERNAL MEDICINE

## 2024-03-02 PROCEDURE — 6360000002 HC RX W HCPCS: Performed by: INTERNAL MEDICINE

## 2024-03-02 PROCEDURE — 97116 GAIT TRAINING THERAPY: CPT

## 2024-03-02 PROCEDURE — 6370000000 HC RX 637 (ALT 250 FOR IP): Performed by: INTERNAL MEDICINE

## 2024-03-02 PROCEDURE — 6370000000 HC RX 637 (ALT 250 FOR IP): Performed by: PHYSICIAN ASSISTANT

## 2024-03-02 PROCEDURE — 94761 N-INVAS EAR/PLS OXIMETRY MLT: CPT

## 2024-03-02 PROCEDURE — 97530 THERAPEUTIC ACTIVITIES: CPT

## 2024-03-02 RX ADMIN — FAMOTIDINE 20 MG: 20 TABLET ORAL at 09:32

## 2024-03-02 RX ADMIN — CHLORDIAZEPOXIDE HYDROCHLORIDE 10 MG: 5 CAPSULE ORAL at 09:32

## 2024-03-02 RX ADMIN — Medication 10 ML: at 20:12

## 2024-03-02 RX ADMIN — CHLORDIAZEPOXIDE HYDROCHLORIDE 10 MG: 5 CAPSULE ORAL at 14:53

## 2024-03-02 RX ADMIN — FUROSEMIDE 40 MG: 40 TABLET ORAL at 18:05

## 2024-03-02 RX ADMIN — Medication 100 MG: at 09:32

## 2024-03-02 RX ADMIN — LISINOPRIL 20 MG: 20 TABLET ORAL at 09:32

## 2024-03-02 RX ADMIN — HEPARIN SODIUM 7500 UNITS: 5000 INJECTION INTRAVENOUS; SUBCUTANEOUS at 23:10

## 2024-03-02 RX ADMIN — HYDRALAZINE HYDROCHLORIDE 50 MG: 50 TABLET, FILM COATED ORAL at 14:53

## 2024-03-02 RX ADMIN — Medication 10 ML: at 09:00

## 2024-03-02 RX ADMIN — CARVEDILOL 12.5 MG: 6.25 TABLET, FILM COATED ORAL at 18:04

## 2024-03-02 RX ADMIN — AMLODIPINE BESYLATE 10 MG: 5 TABLET ORAL at 09:32

## 2024-03-02 RX ADMIN — CHLORDIAZEPOXIDE HYDROCHLORIDE 10 MG: 5 CAPSULE ORAL at 20:11

## 2024-03-02 RX ADMIN — HYDRALAZINE HYDROCHLORIDE 50 MG: 50 TABLET, FILM COATED ORAL at 05:11

## 2024-03-02 RX ADMIN — FAMOTIDINE 20 MG: 20 TABLET ORAL at 20:11

## 2024-03-02 RX ADMIN — HEPARIN SODIUM 7500 UNITS: 5000 INJECTION INTRAVENOUS; SUBCUTANEOUS at 14:53

## 2024-03-02 RX ADMIN — HEPARIN SODIUM 7500 UNITS: 5000 INJECTION INTRAVENOUS; SUBCUTANEOUS at 05:15

## 2024-03-02 RX ADMIN — HYDRALAZINE HYDROCHLORIDE 50 MG: 50 TABLET, FILM COATED ORAL at 23:10

## 2024-03-02 RX ADMIN — FUROSEMIDE 40 MG: 40 TABLET ORAL at 09:32

## 2024-03-02 RX ADMIN — CARVEDILOL 12.5 MG: 6.25 TABLET, FILM COATED ORAL at 09:32

## 2024-03-02 ASSESSMENT — PAIN SCALES - GENERAL
PAINLEVEL_OUTOF10: 0

## 2024-03-02 NOTE — PROGRESS NOTES
03/02/24 0107   NIV Type   $NIV $Daily Charge   Equipment Type V60   Mode Bilevel   Mask Type Full face mask   Mask Size Large   Assessment   Respirations 19   SpO2 98 %   Comfort Level Good   Using Accessory Muscles No   Mask Compliance Good   Skin Assessment Clean, dry, & intact   Skin Protection for O2 Device Yes   Location Nose   Settings/Measurements   PIP Observed 20 cm H20   IPAP 20 cmH20   CPAP/EPAP 10 cmH2O   Vt (Measured) 780 mL   FiO2  35 %   Minute Volume (L/min) 19 Liters   Mask Leak (lpm) 47 lpm   Patient's Home Machine No   Alarm Settings   Alarms On Y

## 2024-03-02 NOTE — PROGRESS NOTES
INPATIENT PULMONARY CRITICAL CARE PROGRESS NOTE      Reason for visit      alcohol intoxication, morbidly obese, undiagnosed sleep apnea with multiple apneic spells about to be using Ativan and phenobarb per CIWA protocol, high risk for decompensation,     SUBJECTIVE: Patient when seen this morning was feeling much better,patient was not having any significant shortness of breath, patient use the BiPAP last night, patient required 35% oxygen on the BiPAP, patient was on 3 L of nasal oxygen with sats 96% when seen this morning, patient was afebrile and he medically maintained, patient had sinus rhythm on the monitor, patient's urine output has been adequate, patient has a cumulative balance of -9.8 L, patient's glycemic control was acceptable, no other pertinent review of system of concern      Physical Exam:  Blood pressure 135/77, pulse 90, temperature 97.6 °F (36.4 °C), resp. rate 18, height 1.803 m (5' 11\"), weight (!) 200.8 kg (442 lb 10.9 oz), SpO2 94 %.'     Constitutional: No respiratory distress on BiPAP  HENT: BiPAP mask intact no thyromegaly.  Eyes:  Conjunctivae are normal. Pupils equal, round, and reactive to light. No scleral icterus.   Neck: . No tracheal deviation present. No obvious thyroid mass.   Cardiovascular: Normal rate, regular rhythm, normal heart sounds.  No right ventricular heave.  Minimal lower extremity edema.  Pulmonary/Chest: No wheezes.  Bibasilar minimal rales.  Chest wall is not dull to percussion.  No accessory muscle usage or stridor.  Decreased breath sound intensity  Abdominal: Soft. Bowel sounds present.  Some distension, no hernia. No tenderness morbid obesity.    Musculoskeletal: No cyanosis. No clubbing. No obvious joint deformity.   Lymphadenopathy: No cervical or supraclavicular adenopathy.   Skin: Skin is warm and dry. No rash or nodules on the exposed extremities.  Some stasis changes in the lower extremities  Neurologic alert and oriented with no focal cranial nerve  has severe sleep apnea with nocturnal hypoxemia and without the machine patient may have worsening of clinical status and likely hospitalization and after discussion-a sleep study in the lab being arranged for the patient on March 18  [employer] will see if they can arrange for a BiPAP for the patient from the hospital after discussing with the corporate office  DME order for BiPAP ordered  Pulmonary toilet  Patient does have some pulm infiltrates on imaging in the bases and patient is not spiking fever and for that reason patient's Rocephin and doxycycline was reintroduced and further titration as per clinical status and cultures  Trend the fever curve-patient was afebrile this morning  Patient does appear to have some pulmonary venous congestion clinically as well as on the base of the data available  Patient has been started on Cardene drip by the admitting provider which can be continued and titration as per patient's clinical status and hemodynamics along with cardiac rhythm  Patient's oral antihypertensive has been started and patient nicardipine has been tapered to DC  Patient to be started on Lasix 40 mg IV push twice daily to continue  Patient has had good response with Lasix IV push  Patient may require repeat echocardiogram  Patient does not need any Precedex infusion  CIWA protocol is in place  Nicotine replacement therapy if the patient wants  Monitor input output and BMP  Correct electrolytes on whenever necessary basis  Of note is being changed to subcutaneous heparin for DVT which was increased to 7500 mg units 3 times daily for prophylaxis due to morbid obesity  Nicotine replacement therapy  PUD prophylaxis  PT/OT      Case discussed with ICU team and case management          Electronically signed by:  SARAH HAMILTON MD    3/2/2024    9:48 AM.

## 2024-03-02 NOTE — PROGRESS NOTES
Physical Therapy  [address]  Physical Therapy Initial Assessment and Treatment Note    Name: Mansoor Walters  : 1995  MRN: [id number]  Date of Service: 3/2/2024    Discharge Recommendations:  Home independently   PT Equipment Recommendations  Equipment Needed: No      Patient Diagnosis(es): The primary encounter diagnosis was Alcohol withdrawal syndrome without complication (HCC). Diagnoses of Pneumonia of right lung due to infectious organism, unspecified part of lung and Hypoxia were also pertinent to this visit.  Past Medical History:  has a past medical history of Anxiety, Depression, Headache(784.0), and Substance abuse (HCC).  Past Surgical History:  has a past surgical history that includes fracture surgery and Upper gastrointestinal endoscopy (N/A, 2022).    Assessment   Assessment: Pt is a 29 yo male who is independent at baseline with all mobility/gait without AD.  Per today's PT evaluation, pt is functioning at baseline and ambulating independently for long distances in hallway without AD; pt managing stairs with independence as well.  No acute PT needs identified; PT to sign off at this time.  Pt safe to discharge home independently when medically stable.  Therapy Prognosis: Excellent  Decision Making: Low Complexity  No Skilled PT: Independent with functional mobility   Requires PT Follow-Up: No  Activity Tolerance  Activity Tolerance: Patient tolerated evaluation without incident;Patient tolerated treatment well  Activity Tolerance Comments: Post ambulation: 96-97% spO2 on 3LO2, HR 94.     Plan   Physical Therapy Plan  General Plan: Discharge with evaluation only  Safety Devices  Type of Devices: Left in bed, Gait belt, Nurse notified, Call light within reach  Restraints  Restraints Initially in Place: No     Restrictions  Restrictions/Precautions  Restrictions/Precautions: General Precautions  Position Activity Restriction  Other position/activity restrictions:  telemetry, 3LO2     Subjective   Pain: Denies pain at rest.  General  Chart Reviewed: Yes  Patient assessed for rehabilitation services?: Yes  Response To Previous Treatment: Not applicable  Family / Caregiver Present: No  Referring Practitioner: Jez Muir MD  Referral Date : 03/02/24  General Comment  Comments: RN cleared for therapy.  Subjective  Subjective: Pt sitting high fowlers in bed; agreeable to therapy.         Social/Functional History  Social/Functional History  Lives With: Friend(s) (but plans to discharge to parents for few weeks)  Type of Home: House  Home Layout: Two level, Able to Live on Main level with bedroom/bathroom (plans to stay on main floor)  Home Access: Level entry  Bathroom Shower/Tub: Tub/Shower unit  Bathroom Toilet: Standard  Bathroom Equipment: Hand-held shower  Home Equipment: Crutches, Walker, rolling  Has the patient had two or more falls in the past year or any fall with injury in the past year?: No  ADL Assistance: Independent  Homemaking Assistance: Independent  Ambulation Assistance: Independent  Transfer Assistance: Independent  Active : Yes  Occupation: Full time employment  Type of Occupation:  - [employer]  Leisure & Hobbies: read, hang out with friends, video games  Additional Comments: Above set up is parent's home, parents are available 24/7 if needed.  Vision/Hearing  Vision  Vision: Within Functional Limits  Hearing  Hearing: Within functional limits    Cognition   Orientation  Orientation Level: Oriented to place;Oriented to time;Oriented to situation  Cognition  Overall Cognitive Status: WNL     Objective   Pulse: 85  Heart Rate Source: Monitor  BP: (!) 154/83  BP Location: Left lower arm  BP Method: Automatic  Patient Position: Semi fowlers  MAP (Calculated): 107  Respirations: 18  SpO2: 96 %  O2 Device: Nasal cannula (3LO2)  Temp: 97.6 °F (36.4 °C)        Gross Assessment  Tone: Normal  Sensation: Intact (denies N/T in UE/LEs)

## 2024-03-02 NOTE — PROGRESS NOTES
[place]    [facility] [place]      Name:  Mansoor Walters /Age/Sex: 1995  (28 y.o. male)   MRN & CSN:  [id number] & [id number] Encounter Date/Time: 3/2/2024 5:53 PM EST   [facility]:  [facility] PCP: No primary care provider on file.     Attending:Nikolay Jensen MD       Hospital Day: 6    Assessment and Recommendations       Assessment/Plan:     Acute hypoxic and hypercapnic respiratory failure:  Felt to be multifactorial including obesity with hypoventilation syndrome underlying sleep apnea as well.  Patient has been weaned off from BiPAP to nasal cannula  Reviewed note from pulmonology  Continue to monitor sats goal form 90 to 94%    Hypertensive emergency:  Patient off drips  Monitor blood pressure  Continue lisinopri 20 mg l, amlodipine 10 mg, and Lasix 40 mg p.o. twice daily  Coreg 12.5 mg twice daily and hydralazine 50 mg every 8 hours  Most recent blood pressure 148/85    Alcohol withdrawal:  Patient on CIWA protocol and has not scored high enough to even require any Ativan at this point  Continue Librium 10 mg 3 times a day  Patient is interested in getting some help  Discussed the case with case management today, [facility] inpatient unit is going to do a phone assessment for alcohol treatment.    History of depression:  History of medical noncompliance with this.    Morbid obesity:      Tobacco abuse:  Currently has nicotine patch  Encouraged to stop smoking    Diet ADULT DIET; Regular; Low Sodium (2 gm)   DVT Prophylaxis [] Lovenox, [x]  Heparin, [] SCDs, [] Ambulation,  [] Eliquis, [] Xarelto  [] Coumadin   Code Status Full Code       Surrogate Decision Maker/ POA      Personally reviewed Lab Studies and Imaging             Subjective:     Chief Complaint: Community-acquired bacterial pneumonia    28-year-old male with past medical history of alcohol use disorder anxiety depression presented to the hospital because of respiratory symptoms going on for the last 2 weeks.  Symptoms of  shortness of breath chest tightness going on for the last 2 weeks it got worse to the point the patient was uncomfortable with the check of the ED patient was found to have a heart rate of 110 with a respiratory rate of 20 blood pressure 174/103 satting at 90%.  Alcohol level was not detectable received 1 L of bolus along with multivitamins in the setting of suspected alcohol use disorder received a dose of Ativan 1 mg.  Along with a nicotine patch in the ED CT scan of the abdominal pelvis showed patchy pneumonia in the lung bases no acute abdominal findings otherwise.  WBC count is normal AST 59 ALT 41 alkaline phosphatase 196.     It is also to note that patient has been facing severe depression since the age of 11 does not have any active suicidal thoughts but feels like that would be an easier way out currently does not see anyone for his depression psychiatry will be contacted and placed in that context.    Subjective: Patient states he is feeling better today.  Somewhat anxious but more about wanting to get better and get help.  Patient's parents are at bedside.      Review of Systems:      Pertinent positives and negatives discussed in HPI    Objective:     Intake/Output Summary (Last 24 hours) at 3/2/2024 1603  Last data filed at 3/2/2024 1323  Gross per 24 hour   Intake 840 ml   Output 1300 ml   Net -460 ml        Vitals:   Vitals:    03/02/24 0107 03/02/24 0342 03/02/24 0815 03/02/24 1409   BP:  132/75 135/77 (!) 154/83   Pulse:  69 90 85   Resp: 19 18 18 18   Temp:  98.6 °F (37 °C) 97.6 °F (36.4 °C) 97.6 °F (36.4 °C)   TempSrc:  Oral     SpO2: 98% 98% 94% 96%   Weight:       Height:             Physical Exam:      General: Slightly anxious  male laying in bed  Saturations 96% on 2 L  Eyes: Clear nonicteric   ENT: neck supple trach midline  Cardiovascular: Regular rate.  No murmur  Respiratory: Clear to auscultation  Gastrointestinal: Soft, non tender  BS Positive morbidly obese  Genitourinary: no  hours.  UA:  Lab Results   Component Value Date/Time    NITRU Negative 02/26/2024 09:35 PM    COLORU Straw 02/26/2024 09:35 PM    PHUR 6.0 02/26/2024 09:35 PM    CLARITYU Clear 02/26/2024 09:35 PM    SPECGRAV 1.010 02/26/2024 09:35 PM    LEUKOCYTESUR Negative 02/26/2024 09:35 PM    UROBILINOGEN 0.2 02/26/2024 09:35 PM    BILIRUBINUR Negative 02/26/2024 09:35 PM    BLOODU Negative 02/26/2024 09:35 PM    GLUCOSEU Negative 02/26/2024 09:35 PM    KETUA Negative 02/26/2024 09:35 PM     Urine Cultures:   Lab Results   Component Value Date/Time    LABURIN No growth at 18 to 36 hours 02/26/2024 09:35 PM     Blood Cultures:   Lab Results   Component Value Date/Time    BC No Growth after 4 days of incubation. 02/26/2024 08:06 PM     Lab Results   Component Value Date/Time    BLOODCULT2 No Growth after 4 days of incubation. 02/26/2024 08:06 PM     Organism: No results found for: \"ORG\"      Electronically signed by Zonia Jensen MD on 3/2/2024 at 4:03 PM      Comment: Please note this report has been produced using speech recognition software and may contain errors related to that system including errors in grammar, punctuation, and spelling, as well as words and phrases that may be inappropriate. If there are any questions or concerns please feel free to contact the dictating provider for clarification.

## 2024-03-02 NOTE — PLAN OF CARE
Problem: Discharge Planning  Goal: Discharge to home or other facility with appropriate resources  Outcome: Progressing     Problem: Pain  Goal: Verbalizes/displays adequate comfort level or baseline comfort level  Outcome: Progressing     Problem: Safety - Adult  Goal: Free from fall injury  Outcome: Progressing     Problem: ABCDS Injury Assessment  Goal: Absence of physical injury  Outcome: Progressing   CHF Care Plan      Patient's EF (Ejection Fraction) is greater than 40%    Heart Failure Medications:  Diuretics:: Furosemide    (One of the following REQUIRED for EF </= 40%/SYSTOLIC FAILURE but MAY be used in EF% >40%/DIASTOLIC FAILURE)        ACE:: Lisinopril        ARB:: None         ARNI:: None    (Beta Blockers)  NON- Evidenced Based Beta Blocker (for EF% >40%/DIASTOLIC FAILURE): None    Evidenced Based Beta Blocker::(REQUIRED for EF% <40%/SYSTOLIC FAILURE) Carvedilol- Coreg  ...................................................................................................................................................    Failed to redirect to the Timeline version of the [facility] SmartLink.      Patient's weights and intake/output reviewed    Daily Weight log at bedside, patient/family participation in use of log: \"yes    Patient's current weight today shows a difference of 0 lbs more than last documented weight.      Intake/Output Summary (Last 24 hours) at 3/2/2024 1441  Last data filed at 3/2/2024 1323  Gross per 24 hour   Intake 840 ml   Output 1300 ml   Net -460 ml       Education Booklet Provided: yes    Comorbidities Reviewed Yes    Patient has a past medical history of Anxiety, Depression, Headache(784.0), and Substance abuse (HCC).     >>For CHF and Comorbidity documentation on Education Time and Topics, please see Education Tab      Pt sitting in bed at this time on  3 L O2. Pt denies shortness of breath. Pt with pitting lower extremity edema.     Patient and/or Family's stated Goal of Care this

## 2024-03-02 NOTE — PLAN OF CARE
Problem: Discharge Planning  Goal: Discharge to home or other facility with appropriate resources  3/1/2024 2035 by Arnulfo Mathews RN  Outcome: Progressing  Flowsheets (Taken 3/1/2024 2031)  Discharge to home or other facility with appropriate resources: Identify barriers to discharge with patient and caregiver  3/1/2024 1858 by Charo Salinas RN  Outcome: Progressing  Flowsheets (Taken 3/1/2024 1200)  Discharge to home or other facility with appropriate resources: Identify barriers to discharge with patient and caregiver     Problem: Pain  Goal: Verbalizes/displays adequate comfort level or baseline comfort level  3/1/2024 2035 by Arnulfo Mathews RN  Outcome: Progressing  3/1/2024 1858 by Charo Salinas RN  Outcome: Progressing  Flowsheets  Taken 3/1/2024 1645  Verbalizes/displays adequate comfort level or baseline comfort level: Encourage patient to monitor pain and request assistance  Taken 3/1/2024 1400  Verbalizes/displays adequate comfort level or baseline comfort level: Encourage patient to monitor pain and request assistance  Taken 3/1/2024 1200  Verbalizes/displays adequate comfort level or baseline comfort level: Encourage patient to monitor pain and request assistance     Problem: Safety - Adult  Goal: Free from fall injury  3/1/2024 2035 by Arnulfo Mathews RN  Outcome: Progressing  3/1/2024 1858 by Charo Salinas RN  Outcome: Progressing  Flowsheets (Taken 3/1/2024 1200)  Free From Fall Injury: Instruct family/caregiver on patient safety     Problem: ABCDS Injury Assessment  Goal: Absence of physical injury  Outcome: Progressing

## 2024-03-02 NOTE — PROGRESS NOTES
Occupational Therapy  Facility/Department: [facility] C4 PCU  Occupational Therapy Initial Assessment/Treatment/Discharge    Name: Mansoor Walters  : 1995  MRN: [id number]  Date of Service: 3/2/2024    Discharge Recommendations:  Home independently  OT Equipment Recommendations  Equipment Needed: No       Patient Diagnosis(es): The primary encounter diagnosis was Alcohol withdrawal syndrome without complication (HCC). Diagnoses of Pneumonia of right lung due to infectious organism, unspecified part of lung and Hypoxia were also pertinent to this visit.  Past Medical History:  has a past medical history of Anxiety, Depression, Headache(784.0), and Substance abuse (HCC).  Past Surgical History:  has a past surgical history that includes fracture surgery and Upper gastrointestinal endoscopy (N/A, 2022).    Treatment Diagnosis: impaired mobility and ADLs      Assessment   Assessment: Pt is a 29 yo male admitted to [facility] with a dx of pneumonia and alcohol withdrawal symptoms. Patient reports IND with I/ADLs and mobility without AD at baseline. Completed OT evaluation with patient presenting at baseline for functional transfers, dressing and bathing. Patient currently on 3L of O2 with vitals remaining >90% throughout session. Skilled OT services are not warranted at this time. Recommend patient discharge home IND. Patient with plans to stay with parents upon discharge.  Treatment Diagnosis: impaired mobility and ADLs  Prognosis: Good  Decision Making: Low Complexity  REQUIRES OT FOLLOW-UP: No  Activity Tolerance  Activity Tolerance: Patient Tolerated treatment well  Activity Tolerance Comments: SOB observed however vitals remain >90% on 3L O2 via NC        Plan   Occupational Therapy Plan  Times Per Week: 1x only     Restrictions  Restrictions/Precautions  Restrictions/Precautions: General Precautions  Position Activity Restriction  Other position/activity restrictions: telemetry, 3LO2    Subjective   General  Chart  Yes  Overall Level of Assistance: Independent  Distance (ft): 300 Feet  Assistive Device: None;Gait belt  Gait Abnormalities: Other (comment) (unremarkable - WNL gait pattern)  Rail Use: Both  Stairs - Level of Assistance: Independent (w/ rails)  Number of Stairs Trained: 4     AROM: Within functional limits  PROM: Within functional limits  Strength: Within functional limits  Coordination: Within functional limits  Tone: Normal  Sensation: Intact  ADL  Feeding: Independent  Grooming: Modified independent   UE Dressing: Independent  LE Dressing: Independent  Toileting: Independent     Activity Tolerance  Activity Tolerance: Patient tolerated evaluation without incident;Patient tolerated treatment well  Activity Tolerance Comments: Post ambulation: 96-97% spO2 on 3LO2, HR 94.        Vision  Vision: Within Functional Limits  Hearing  Hearing: Within functional limits  Cognition  Overall Cognitive Status: WNL  Orientation  Orientation Level: Oriented to place;Oriented to time;Oriented to situation                  Education Given To: Patient  Education Provided: Role of Therapy;Plan of Care  Education Provided Comments: Disease Specific Education: Pt educated on importance of OOB mobility, prevention of complications of bedrest, and general safety during hospitalization. Pt verbalized understanding  Education Method: Verbal  Barriers to Learning: None  Education Outcome: Verbalized understanding;Demonstrated understanding    AM-PAC - ADL  AM-PAC Daily Activity - Inpatient   How much help is needed for putting on and taking off regular lower body clothing?: None  How much help is needed for bathing (which includes washing, rinsing, drying)?: None  How much help is needed for toileting (which includes using toilet, bedpan, or urinal)?: None  How much help is needed for putting on and taking off regular upper body clothing?: None  How much help is needed for taking care of personal grooming?: None  How much help for eating

## 2024-03-02 NOTE — CARE COORDINATION
CM notifed Chay with [facility] of need for cpap at home for dc Fri PM. On call liaison for [employer] does not have record as yet of open referral and cannot process/provide bipap over w/e.

## 2024-03-03 PROCEDURE — 6370000000 HC RX 637 (ALT 250 FOR IP): Performed by: INTERNAL MEDICINE

## 2024-03-03 PROCEDURE — 6370000000 HC RX 637 (ALT 250 FOR IP): Performed by: PHYSICIAN ASSISTANT

## 2024-03-03 PROCEDURE — 6360000002 HC RX W HCPCS: Performed by: INTERNAL MEDICINE

## 2024-03-03 PROCEDURE — 94761 N-INVAS EAR/PLS OXIMETRY MLT: CPT

## 2024-03-03 PROCEDURE — 6370000000 HC RX 637 (ALT 250 FOR IP): Performed by: NURSE PRACTITIONER

## 2024-03-03 PROCEDURE — 6370000000 HC RX 637 (ALT 250 FOR IP): Performed by: HOSPITALIST

## 2024-03-03 PROCEDURE — 2580000003 HC RX 258: Performed by: STUDENT IN AN ORGANIZED HEALTH CARE EDUCATION/TRAINING PROGRAM

## 2024-03-03 PROCEDURE — 2700000000 HC OXYGEN THERAPY PER DAY

## 2024-03-03 PROCEDURE — 99232 SBSQ HOSP IP/OBS MODERATE 35: CPT | Performed by: INTERNAL MEDICINE

## 2024-03-03 PROCEDURE — 2060000000 HC ICU INTERMEDIATE R&B

## 2024-03-03 RX ADMIN — FAMOTIDINE 20 MG: 20 TABLET ORAL at 19:50

## 2024-03-03 RX ADMIN — CHLORDIAZEPOXIDE HYDROCHLORIDE 10 MG: 5 CAPSULE ORAL at 14:55

## 2024-03-03 RX ADMIN — HYDRALAZINE HYDROCHLORIDE 50 MG: 50 TABLET, FILM COATED ORAL at 14:55

## 2024-03-03 RX ADMIN — FUROSEMIDE 40 MG: 40 TABLET ORAL at 17:56

## 2024-03-03 RX ADMIN — HEPARIN SODIUM 7500 UNITS: 5000 INJECTION INTRAVENOUS; SUBCUTANEOUS at 05:07

## 2024-03-03 RX ADMIN — HYDRALAZINE HYDROCHLORIDE 50 MG: 50 TABLET, FILM COATED ORAL at 05:07

## 2024-03-03 RX ADMIN — LISINOPRIL 20 MG: 20 TABLET ORAL at 09:49

## 2024-03-03 RX ADMIN — HYDRALAZINE HYDROCHLORIDE 50 MG: 50 TABLET, FILM COATED ORAL at 23:13

## 2024-03-03 RX ADMIN — CHLORDIAZEPOXIDE HYDROCHLORIDE 10 MG: 5 CAPSULE ORAL at 09:49

## 2024-03-03 RX ADMIN — Medication 10 ML: at 09:50

## 2024-03-03 RX ADMIN — Medication 100 MG: at 09:49

## 2024-03-03 RX ADMIN — HEPARIN SODIUM 7500 UNITS: 5000 INJECTION INTRAVENOUS; SUBCUTANEOUS at 23:13

## 2024-03-03 RX ADMIN — CHLORDIAZEPOXIDE HYDROCHLORIDE 10 MG: 5 CAPSULE ORAL at 19:50

## 2024-03-03 RX ADMIN — AMLODIPINE BESYLATE 10 MG: 5 TABLET ORAL at 09:49

## 2024-03-03 RX ADMIN — CARVEDILOL 12.5 MG: 6.25 TABLET, FILM COATED ORAL at 17:56

## 2024-03-03 RX ADMIN — HEPARIN SODIUM 7500 UNITS: 5000 INJECTION INTRAVENOUS; SUBCUTANEOUS at 14:55

## 2024-03-03 RX ADMIN — FUROSEMIDE 40 MG: 40 TABLET ORAL at 09:49

## 2024-03-03 RX ADMIN — FAMOTIDINE 20 MG: 20 TABLET ORAL at 09:49

## 2024-03-03 RX ADMIN — CARVEDILOL 12.5 MG: 6.25 TABLET, FILM COATED ORAL at 09:49

## 2024-03-03 ASSESSMENT — PAIN SCALES - GENERAL
PAINLEVEL_OUTOF10: 0

## 2024-03-03 NOTE — PLAN OF CARE
Problem: Discharge Planning  Goal: Discharge to home or other facility with appropriate resources  3/3/2024 0057 by Nickie Rodriguez RN  Outcome: Progressing  3/2/2024 1431 by Toshia Hernandez RN  Outcome: Progressing     Problem: Pain  Goal: Verbalizes/displays adequate comfort level or baseline comfort level  3/3/2024 0057 by Nickie Rodrigeuz RN  Outcome: Progressing  Flowsheets (Taken 3/2/2024 2301)  Verbalizes/displays adequate comfort level or baseline comfort level: Encourage patient to monitor pain and request assistance  3/2/2024 1431 by Toshia Hernandez RN  Outcome: Progressing     Problem: Safety - Adult  Goal: Free from fall injury  3/3/2024 0057 by Nickie Rodriguez RN  Outcome: Progressing  3/2/2024 1431 by Toshia Hernandez RN  Outcome: Progressing     Problem: ABCDS Injury Assessment  Goal: Absence of physical injury  3/3/2024 0057 by Nickie Rodriguez RN  Outcome: Progressing  3/2/2024 1431 by Toshia Hernandez RN  Outcome: Progressing

## 2024-03-03 NOTE — PROGRESS NOTES
V2.0    [facility] Progress Note      Name:  Mansoor Walters /Age/Sex: 1995  (28 y.o. male)   MRN & CSN:  [id number] & [id number] Encounter Date/Time: 3/3/2024 5:53 PM EST   Location:  [facility] PCP: No primary care provider on file.     Attending:Nikolay Jensen MD       Hospital Day: 7    Assessment and Recommendations       Assessment/Plan:     Acute hypoxic and hypercapnic respiratory failure:  Felt to be multifactorial including obesity with hypoventilation syndrome underlying sleep apnea as well.  Reviewed note from pulmonology  Patient now on room air and doing well  Pulmonology has arranged for BiPAP for patient at time of discharge    Hypertensive emergency: Resolved  Patient off drips  Monitor blood pressure  Continue lisinopri 20 mg l, amlodipine 10 mg, and Lasix 40 mg p.o. twice daily  Coreg 12.5 mg twice daily and hydralazine 50 mg every 8 hours  Most recent blood pressure 130/77    Alcohol withdrawal:  Patient on CIWA protocol and has not scored high enough to even require any Ativan at this point  Continue Librium 10 mg 3 times a day  Patient is interested in getting some help  Goal is for patient to go to [facility] inpatient awaiting assessment    History of depression:  History of medical noncompliance with this.    Morbid obesity:  Ongoing    Tobacco abuse:  Currently has nicotine patch  Encouraged to stop smoking    Diet ADULT DIET; Regular; Low Sodium (2 gm)   DVT Prophylaxis [] Lovenox, [x]  Heparin, [] SCDs, [] Ambulation,  [] Eliquis, [] Xarelto  [] Coumadin   Code Status Full Code       Surrogate Decision Maker/ POA      Personally reviewed Lab Studies and Imaging             Subjective:     Chief Complaint: Community-acquired bacterial pneumonia    28-year-old male with past medical history of alcohol use disorder anxiety depression presented to the hospital because of respiratory symptoms going on for the last 2 weeks.  Symptoms of shortness of breath chest tightness  going on for the last 2 weeks it got worse to the point the patient was uncomfortable with the check of the ED patient was found to have a heart rate of 110 with a respiratory rate of 20 blood pressure 174/103 satting at 90%.  Alcohol level was not detectable received 1 L of bolus along with multivitamins in the setting of suspected alcohol use disorder received a dose of Ativan 1 mg.  Along with a nicotine patch in the ED CT scan of the abdominal pelvis showed patchy pneumonia in the lung bases no acute abdominal findings otherwise.  WBC count is normal AST 59 ALT 41 alkaline phosphatase 196.     It is also to note that patient has been facing severe depression since the age of 11 does not have any active suicidal thoughts but feels like that would be an easier way out currently does not see anyone for his depression psychiatry will be contacted and placed in that context.    Subjective: Patient states he feels fine    Review of Systems:      Pertinent positives and negatives discussed in HPI    Objective:     Intake/Output Summary (Last 24 hours) at 3/3/2024 1437  Last data filed at 3/3/2024 0914  Gross per 24 hour   Intake 360 ml   Output --   Net 360 ml        Vitals:   Vitals:    03/02/24 2309 03/03/24 0506 03/03/24 0530 03/03/24 0952   BP: 137/75 125/71  130/77   Pulse: 81 90  89   Resp: 20 20  20   Temp: 97.8 °F (36.6 °C) 97.6 °F (36.4 °C)  97.6 °F (36.4 °C)   TempSrc: Oral Oral     SpO2: 98% 97%  94%   Weight:   (!) 195.6 kg (431 lb 4 oz)    Height:             Physical Exam:      General: Male NAD   Laying in bed on room air   Eyes: Clear nonicteric   ENT: neck supple trach midline  Cardiovascular: Regular rate.    Respiratory: Clear to auscultation  Gastrointestinal: Soft, non tender  Noted bowel sounds  Patient is morbidly obese  Genitourinary: no suprapubic tenderness  Musculoskeletal:  edema none  Skin: warm, dry  Neuro: A & O: X 3  Psych:  affect depressed        Medications:   Medications:     PM    UROBILINOGEN 0.2 02/26/2024 09:35 PM    BILIRUBINUR Negative 02/26/2024 09:35 PM    BLOODU Negative 02/26/2024 09:35 PM    GLUCOSEU Negative 02/26/2024 09:35 PM    KETUA Negative 02/26/2024 09:35 PM     Urine Cultures:   Lab Results   Component Value Date/Time    LABURIN No growth at 18 to 36 hours 02/26/2024 09:35 PM     Blood Cultures:   Lab Results   Component Value Date/Time    BC No Growth after 4 days of incubation. 02/26/2024 08:06 PM     Lab Results   Component Value Date/Time    BLOODCULT2 No Growth after 4 days of incubation. 02/26/2024 08:06 PM     Organism: No results found for: \"ORG\"      Electronically signed by Zonia Jensen MD on 3/3/2024 at 2:37 PM      Comment: Please note this report has been produced using speech recognition software and may contain errors related to that system including errors in grammar, punctuation, and spelling, as well as words and phrases that may be inappropriate. If there are any questions or concerns please feel free to contact the dictating provider for clarification.

## 2024-03-03 NOTE — PROGRESS NOTES
INPATIENT PULMONARY CRITICAL CARE PROGRESS NOTE      Reason for visit      alcohol intoxication, morbidly obese, undiagnosed sleep apnea with multiple apneic spells about to be using Ativan and phenobarb per CIWA protocol, high risk for decompensation,     SUBJECTIVE: Patient when seen this morning was feeling much better,patient was not having any significant shortness of breath, patient was not having any significant cough or expectoration of concern, patient was on room air oxygen with saturation 94%, patient is afebrile and hemodynamically maintained, patient has sinus rhythm on the monitor, patient urine output has not been recorded in the epic for review, patient glycemic control has been suitable, patient was alert and oriented, no other pertinent review of system of concern      Physical Exam:  Blood pressure 125/71, pulse 90, temperature 97.6 °F (36.4 °C), temperature source Oral, resp. rate 20, height 1.803 m (5' 11\"), weight (!) 195.6 kg (431 lb 4 oz), SpO2 97 %.'     Constitutional: No respiratory distress on room air  HENT: No facial asymmetry no thyromegaly.  Eyes:  Conjunctivae are normal. Pupils equal, round, and reactive to light. No scleral icterus.   Neck: . No tracheal deviation present. No obvious thyroid mass.   Cardiovascular: Normal rate, regular rhythm, normal heart sounds.  No right ventricular heave.  No significant lower extremity edema.  Pulmonary/Chest: No wheezes.  No significant l rales.  Chest wall is not dull to percussion.  No accessory muscle usage or stridor.  Decreased breath sound intensity  Abdominal: Soft. Bowel sounds present.  Some distension, no hernia. No tenderness morbid obesity.    Musculoskeletal: No cyanosis. No clubbing. No obvious joint deformity.   Lymphadenopathy: No cervical or supraclavicular adenopathy.   Skin: Skin is warm and dry. No rash or nodules on the exposed extremities.  Some stasis changes in the lower extremities  Neurologic alert and oriented with  distinct sleep apnea and possible OHS-Case discussed with case management and [employer] company about getting the patient the CPAP/BiPAP from the hospital as patient has severe sleep apnea with nocturnal hypoxemia and without the machine patient may have worsening of clinical status and likely hospitalization and after discussion-a sleep study in the lab being arranged for the patient on March 18  Pulmonary toilet  Patient is off antibiotics  Patient remains afebrile  Patient does appear to have some pulmonary venous congestion clinically as well as on the base of the data available  Patient has been started on Librium by internal medicine team  Antihypertensives as per primary team  P.o. Lasix to continue  Nicotine replacement therapy if the patient wants  Monitor input output and BMP  Correct electrolytes on whenever necessary basis  Nicotine replacement therapy  PUD/DVT prophylaxis   PT/OT      Case discussed with patient and family  Case discussed with nursing    Discharge planning    No other recommendations from pulmonary/critical care standpoint of view-will sign off-please call on whenever necessary basis if the patient is not discharged          Electronically signed by:  SARAH HAMILTON MD    3/3/2024    9:47 AM.

## 2024-03-04 LAB
GLUCOSE BLD-MCNC: 136 MG/DL (ref 70–99)
PERFORMED ON: ABNORMAL

## 2024-03-04 PROCEDURE — 2580000003 HC RX 258: Performed by: STUDENT IN AN ORGANIZED HEALTH CARE EDUCATION/TRAINING PROGRAM

## 2024-03-04 PROCEDURE — 6370000000 HC RX 637 (ALT 250 FOR IP): Performed by: HOSPITALIST

## 2024-03-04 PROCEDURE — 6370000000 HC RX 637 (ALT 250 FOR IP): Performed by: NURSE PRACTITIONER

## 2024-03-04 PROCEDURE — 2060000000 HC ICU INTERMEDIATE R&B

## 2024-03-04 PROCEDURE — 6370000000 HC RX 637 (ALT 250 FOR IP): Performed by: INTERNAL MEDICINE

## 2024-03-04 PROCEDURE — 6360000002 HC RX W HCPCS: Performed by: INTERNAL MEDICINE

## 2024-03-04 RX ADMIN — HYDRALAZINE HYDROCHLORIDE 50 MG: 50 TABLET, FILM COATED ORAL at 21:28

## 2024-03-04 RX ADMIN — LISINOPRIL 20 MG: 20 TABLET ORAL at 08:59

## 2024-03-04 RX ADMIN — FUROSEMIDE 40 MG: 40 TABLET ORAL at 08:59

## 2024-03-04 RX ADMIN — HYDRALAZINE HYDROCHLORIDE 50 MG: 50 TABLET, FILM COATED ORAL at 06:28

## 2024-03-04 RX ADMIN — Medication 100 MG: at 08:59

## 2024-03-04 RX ADMIN — FAMOTIDINE 20 MG: 20 TABLET ORAL at 21:29

## 2024-03-04 RX ADMIN — CHLORDIAZEPOXIDE HYDROCHLORIDE 10 MG: 5 CAPSULE ORAL at 21:29

## 2024-03-04 RX ADMIN — HYDRALAZINE HYDROCHLORIDE 50 MG: 50 TABLET, FILM COATED ORAL at 16:40

## 2024-03-04 RX ADMIN — AMLODIPINE BESYLATE 10 MG: 5 TABLET ORAL at 08:58

## 2024-03-04 RX ADMIN — CHLORDIAZEPOXIDE HYDROCHLORIDE 10 MG: 5 CAPSULE ORAL at 08:58

## 2024-03-04 RX ADMIN — HEPARIN SODIUM 7500 UNITS: 5000 INJECTION INTRAVENOUS; SUBCUTANEOUS at 21:29

## 2024-03-04 RX ADMIN — HEPARIN SODIUM 7500 UNITS: 5000 INJECTION INTRAVENOUS; SUBCUTANEOUS at 16:31

## 2024-03-04 RX ADMIN — CHLORDIAZEPOXIDE HYDROCHLORIDE 10 MG: 5 CAPSULE ORAL at 16:40

## 2024-03-04 RX ADMIN — FUROSEMIDE 40 MG: 40 TABLET ORAL at 16:40

## 2024-03-04 RX ADMIN — FAMOTIDINE 20 MG: 20 TABLET ORAL at 08:59

## 2024-03-04 RX ADMIN — CARVEDILOL 12.5 MG: 6.25 TABLET, FILM COATED ORAL at 16:40

## 2024-03-04 RX ADMIN — CARVEDILOL 12.5 MG: 6.25 TABLET, FILM COATED ORAL at 08:58

## 2024-03-04 RX ADMIN — HEPARIN SODIUM 7500 UNITS: 5000 INJECTION INTRAVENOUS; SUBCUTANEOUS at 06:28

## 2024-03-04 RX ADMIN — Medication 10 ML: at 21:31

## 2024-03-04 ASSESSMENT — PAIN SCALES - GENERAL
PAINLEVEL_OUTOF10: 0
PAINLEVEL_OUTOF10: 0

## 2024-03-04 NOTE — PROGRESS NOTES
V2.0    [facility] [place]      Name:  Mansoor Walters /Age/Sex: 1995  (28 y.o. male)   MRN & CSN:  [id number] & [id number] Encounter Date/Time: 3/4/2024 5:53 PM EST   Location:  [facility] PCP: No primary care provider on file.     Attending:Nikolay Jensen MD       Hospital Day: 8    Assessment and Recommendations       Assessment/Plan:     Acute hypoxic and hypercapnic respiratory failure:  Felt to be multifactorial including obesity with hypoventilation syndrome underlying sleep apnea as well.  On room air at this time doing well  Pulmonology has arranged for BiPAP for patient at time of discharge    Hypertensive emergency: Resolved  Patient off drips  Monitor blood pressure  Continue lisinopri 20 mg l, amlodipine 10 mg, and Lasix 40 mg p.o. twice daily  Coreg 12.5 mg twice daily and hydralazine 50 mg every 8 hours  Most recent blood pressure 123/75 continue current management    Alcohol withdrawal:  Patient on CIWA protocol and has not scored high enough to even require any Ativan at this point  Continue Librium 10 mg 3 times a day  Patient is interested in getting some help  Goal is for patient to go to [facility] inpatient hopeful disposition to [facility] inpatient unit    History of depression:  History of medical noncompliance with this.    Morbid obesity:  Ongoing    Tobacco abuse:  Currently has nicotine patch  Encouraged to stop smoking    Diet ADULT DIET; Regular; Low Sodium (2 gm)   DVT Prophylaxis [] Lovenox, [x]  Heparin, [] SCDs, [] Ambulation,  [] Eliquis, [] Xarelto  [] Coumadin   Code Status Full Code       Surrogate Decision Maker/ POA      Personally reviewed Lab Studies and Imaging             Subjective:     Chief Complaint: Community-acquired bacterial pneumonia    28-year-old male with past medical history of alcohol use disorder anxiety depression presented to the hospital because of respiratory symptoms going on for the last 2 weeks.  Symptoms of shortness of  lung consolidation is seen.     XR CHEST PORTABLE    Result Date: 2/26/2024  EXAMINATION: ONE XRAY VIEW OF THE CHEST 2/26/2024 1:59 pm COMPARISON: CXR dated 02/26/2024 HISTORY: ORDERING SYSTEM PROVIDED HISTORY: sob TECHNOLOGIST PROVIDED HISTORY: Reason for exam:->sob Reason for Exam: sob FINDINGS: There is mild apparent widening of the mediastinum, may be projectional in nature. Low lung volumes and patchy bibasilar opacities.  Multiple nodular densities primarily in the right lung, difficult to ascertain whether these represent nodules are vessels en [place]. Recommend repeat radiograph with max inspiration and lateral views. No pneumothorax or pleural effusion.     1. There is mild apparent widening of the mediastinum, may be projectional in nature. 2. Low lung volumes and patchy bibasilar opacities.  Multiple nodular densities primarily in the right lung, difficult to ascertain whether these represent nodules are vessels en [place]. 3. Recommend repeat radiograph with max inspiration and lateral views.     CT ABDOMEN PELVIS W IV CONTRAST Additional Contrast? None    Result Date: 2/26/2024  EXAMINATION: CT OF THE ABDOMEN AND PELVIS WITH CONTRAST 2/26/2024 12:41 pm TECHNIQUE: CT of the abdomen and pelvis was performed with the administration of intravenous contrast. Multiplanar reformatted images are provided for review. Automated exposure control, iterative reconstruction, and/or weight based adjustment of the mA/kV was utilized to reduce the radiation dose to as low as reasonably achievable. COMPARISON: July 2022 HISTORY: ORDERING SYSTEM PROVIDED HISTORY: abdominal pain with N/V TECHNOLOGIST PROVIDED HISTORY: Reason for exam:->abdominal pain with N/V Additional Contrast?->None Decision Support Exception - unselect if not a suspected or confirmed emergency medical condition->Emergency Medical Condition (MA) Reason for Exam: epigastric pain x 2 days-cough Additional signs and symptoms: denies n/v Relevant  CLARITYU Clear 02/26/2024 09:35 PM    SPECGRAV 1.010 02/26/2024 09:35 PM    LEUKOCYTESUR Negative 02/26/2024 09:35 PM    UROBILINOGEN 0.2 02/26/2024 09:35 PM    BILIRUBINUR Negative 02/26/2024 09:35 PM    BLOODU Negative 02/26/2024 09:35 PM    GLUCOSEU Negative 02/26/2024 09:35 PM    KETUA Negative 02/26/2024 09:35 PM     Urine Cultures:   Lab Results   Component Value Date/Time    LABURIN No growth at 18 to 36 hours 02/26/2024 09:35 PM     Blood Cultures:   Lab Results   Component Value Date/Time    BC No Growth after 4 days of incubation. 02/26/2024 08:06 PM     Lab Results   Component Value Date/Time    BLOODCULT2 No Growth after 4 days of incubation. 02/26/2024 08:06 PM     Organism: No results found for: \"ORG\"      Electronically signed by Zonia Jensen MD on 3/4/2024 at 9:38 AM      Comment: Please note this report has been produced using speech recognition software and may contain errors related to that system including errors in grammar, punctuation, and spelling, as well as words and phrases that may be inappropriate. If there are any questions or concerns please feel free to contact the dictating provider for clarification.

## 2024-03-04 NOTE — CARE COORDINATION
This writer spoke with Chay (liaison) [facility] regarding home BIPAP/CPAP provided to patient before discharge.   Chay states that this is not possible until patient has a sleep study, which is not scheduled until 3/18.  Following.      Carmenza Brown RN

## 2024-03-04 NOTE — CONSULTS
Consult to psychiatry order placed on 02/26/2024. Please see note by Dr. Gifford with psychiatry service on 02/27/2024.  If Mansoor needs to be seen again by psychiatry during this admission, please reconsult.     Shira Trevino, JC - CNP  03/04/24

## 2024-03-04 NOTE — PLAN OF CARE
Problem: Discharge Planning  Goal: Discharge to home or other facility with appropriate resources  3/4/2024 0053 by Nickie Rodriguez RN  Outcome: Adequate for Discharge  3/3/2024 1530 by Toshia Hernandez RN  Outcome: Progressing     Problem: Pain  Goal: Verbalizes/displays adequate comfort level or baseline comfort level  3/4/2024 0053 by Nickie Rodriguez RN  Outcome: Adequate for Discharge  3/3/2024 1530 by Toshia Hernandez RN  Outcome: Progressing     Problem: Safety - Adult  Goal: Free from fall injury  3/4/2024 0053 by Nickie Rodriguez RN  Outcome: Adequate for Discharge  3/3/2024 1530 by Toshia Hernandez RN  Outcome: Progressing     Problem: ABCDS Injury Assessment  Goal: Absence of physical injury  3/4/2024 0053 by Nickie Rodriguez RN  Outcome: Adequate for Discharge  3/3/2024 1530 by Toshia Hernandez RN  Outcome: Progressing

## 2024-03-04 NOTE — PLAN OF CARE
Problem: Discharge Planning  Goal: Discharge to home or other facility with appropriate resources  Outcome: Progressing     Problem: Pain  Goal: Verbalizes/displays adequate comfort level or baseline comfort level  Outcome: Progressing     Problem: Safety - Adult  Goal: Free from fall injury  Outcome: Progressing     Problem: ABCDS Injury Assessment  Goal: Absence of physical injury  Outcome: Progressing   CHF Care Plan      Patient's EF (Ejection Fraction) is greater than 40%    Heart Failure Medications:  Diuretics:: Furosemide    (One of the following REQUIRED for EF </= 40%/SYSTOLIC FAILURE but MAY be used in EF% >40%/DIASTOLIC FAILURE)        ACE:: Lisinopril        ARB:: None         ARNI:: None    (Beta Blockers)  NON- Evidenced Based Beta Blocker (for EF% >40%/DIASTOLIC FAILURE): None    Evidenced Based Beta Blocker::(REQUIRED for EF% <40%/SYSTOLIC FAILURE) Carvedilol- Coreg  ...................................................................................................................................................    Failed to redirect to the Timeline version of the [employer] Smart[employer].      Patient's weights and intake/output reviewed    Daily Weight log at bedside, patient/family participation in use of log: \"yes    Patient's current weight today shows a difference of 2 lbs less than last documented weight.      Intake/Output Summary (Last 24 hours) at 3/4/2024 1742  Last data filed at 3/3/2024 1804  Gross per 24 hour   Intake 240 ml   Output --   Net 240 ml       Education Booklet Provided: yes    Comorbidities Reviewed Yes    Patient has a past medical history of Anxiety, Depression, Headache(784.0), and Substance abuse (HCC).     >>For CHF and Comorbidity documentation on Education Time and Topics, please see Education Tab      Pt sitting in bed at this time on room air. Pt denies shortness of breath. Pt with pitting lower extremity edema.     Patient and/or Family's stated Goal of Care this  Admission: reduce shortness of breath, increase activity tolerance, better understand heart failure and disease management, be more comfortable, and reduce lower extremity edema prior to discharge  20 mins on education      :

## 2024-03-04 NOTE — CARE COORDINATION
Conversation with patient at bedside regarding note reflecting that he was interested in inpatient SA treatment at [facility]. Mansoor states that he never actually spoke with [facility] liaison, and has decided to discharge to his parent's home and attend AA meetings. He further states that he has SA resource packet, and if he feels that this plan is not working, he will contact [facility].    This writer contacted Chay ([facility] liaison) regarding ORder from Dr Willett regarding home CPAP/NIV. Chay will follow up and CB.    Following,  Carmenza Brown RN

## 2024-03-05 VITALS
SYSTOLIC BLOOD PRESSURE: 143 MMHG | HEIGHT: 71 IN | HEART RATE: 78 BPM | WEIGHT: 315 LBS | TEMPERATURE: 97.6 F | RESPIRATION RATE: 20 BRPM | DIASTOLIC BLOOD PRESSURE: 87 MMHG | OXYGEN SATURATION: 94 % | BODY MASS INDEX: 44.1 KG/M2

## 2024-03-05 PROCEDURE — 6370000000 HC RX 637 (ALT 250 FOR IP): Performed by: NURSE PRACTITIONER

## 2024-03-05 PROCEDURE — 6360000002 HC RX W HCPCS: Performed by: INTERNAL MEDICINE

## 2024-03-05 PROCEDURE — 2580000003 HC RX 258: Performed by: STUDENT IN AN ORGANIZED HEALTH CARE EDUCATION/TRAINING PROGRAM

## 2024-03-05 PROCEDURE — 6370000000 HC RX 637 (ALT 250 FOR IP): Performed by: INTERNAL MEDICINE

## 2024-03-05 PROCEDURE — 6370000000 HC RX 637 (ALT 250 FOR IP): Performed by: HOSPITALIST

## 2024-03-05 RX ORDER — CHLORDIAZEPOXIDE HYDROCHLORIDE 10 MG/1
10 CAPSULE, GELATIN COATED ORAL 3 TIMES DAILY
Qty: 15 CAPSULE | Refills: 0 | Status: SHIPPED | OUTPATIENT
Start: 2024-03-05 | End: 2024-03-07

## 2024-03-05 RX ORDER — CARVEDILOL 12.5 MG/1
12.5 TABLET ORAL 2 TIMES DAILY WITH MEALS
Qty: 60 TABLET | Refills: 0 | Status: SHIPPED | OUTPATIENT
Start: 2024-03-05

## 2024-03-05 RX ORDER — LANOLIN ALCOHOL/MO/W.PET/CERES
100 CREAM (GRAM) TOPICAL DAILY
Qty: 30 TABLET | Refills: 0 | Status: SHIPPED | OUTPATIENT
Start: 2024-03-06

## 2024-03-05 RX ORDER — FUROSEMIDE 40 MG/1
40 TABLET ORAL 2 TIMES DAILY
Qty: 60 TABLET | Refills: 0 | Status: SHIPPED | OUTPATIENT
Start: 2024-03-05

## 2024-03-05 RX ORDER — FAMOTIDINE 20 MG/1
20 TABLET, FILM COATED ORAL 2 TIMES DAILY
Qty: 60 TABLET | Refills: 0 | Status: SHIPPED | OUTPATIENT
Start: 2024-03-05

## 2024-03-05 RX ORDER — HYDRALAZINE HYDROCHLORIDE 50 MG/1
50 TABLET, FILM COATED ORAL EVERY 8 HOURS SCHEDULED
Qty: 90 TABLET | Refills: 0 | Status: SHIPPED | OUTPATIENT
Start: 2024-03-05 | End: 2024-04-04

## 2024-03-05 RX ADMIN — CHLORDIAZEPOXIDE HYDROCHLORIDE 10 MG: 5 CAPSULE ORAL at 13:29

## 2024-03-05 RX ADMIN — Medication 100 MG: at 08:07

## 2024-03-05 RX ADMIN — FAMOTIDINE 20 MG: 20 TABLET ORAL at 08:08

## 2024-03-05 RX ADMIN — LISINOPRIL 20 MG: 20 TABLET ORAL at 08:07

## 2024-03-05 RX ADMIN — HYDRALAZINE HYDROCHLORIDE 50 MG: 50 TABLET, FILM COATED ORAL at 06:32

## 2024-03-05 RX ADMIN — AMLODIPINE BESYLATE 10 MG: 5 TABLET ORAL at 08:07

## 2024-03-05 RX ADMIN — CHLORDIAZEPOXIDE HYDROCHLORIDE 10 MG: 5 CAPSULE ORAL at 08:08

## 2024-03-05 RX ADMIN — CARVEDILOL 12.5 MG: 6.25 TABLET, FILM COATED ORAL at 08:08

## 2024-03-05 RX ADMIN — HEPARIN SODIUM 7500 UNITS: 5000 INJECTION INTRAVENOUS; SUBCUTANEOUS at 06:32

## 2024-03-05 RX ADMIN — FUROSEMIDE 40 MG: 40 TABLET ORAL at 08:08

## 2024-03-05 RX ADMIN — Medication 10 ML: at 08:07

## 2024-03-05 RX ADMIN — HYDRALAZINE HYDROCHLORIDE 50 MG: 50 TABLET, FILM COATED ORAL at 13:29

## 2024-03-05 ASSESSMENT — PAIN SCALES - GENERAL
PAINLEVEL_OUTOF10: 0
PAINLEVEL_OUTOF10: 0

## 2024-03-05 NOTE — PROGRESS NOTES
Comprehensive Nutrition Assessment    Type and Reason for Visit:  Initial, RD Nutrition Re-Screen/LOS    Nutrition Recommendations/Plan:   Continue low sodium diet and encourage PO intake   Monitor nutrition adequacy, pertinent labs, bowel habits, wt changes, and clinical progress     Malnutrition Assessment:  Malnutrition Status:  No malnutrition (03/05/24 0949)    Context:  Acute Illness       Nutrition Assessment:    LOS assessment: 28 y.o. m w/ pmh of alcohol use disorder and depression,  HTN, GERD admitted w/ Community acquired bacterial pneumonia. On low sodium diet. PO intakes % of meals. Pt reports good appetite and intake PTA and since admission. Reports weight loss since admission mainly d/t fluid and eating healthier. VUD=485 lb. Wt trending down since admission, pt also -7.5 L since admission. Declined diet education or additional diet education questions. Continue to encourage PO intake, will continue to monitor.    Nutrition Related Findings:    -7.5 L since admission. + BM today. BLE + 2 edema. BUE non-pitting edema. Wound Type: None       Current Nutrition Intake & Therapies:    Average Meal Intake: %  Average Supplements Intake: None Ordered  ADULT DIET; Regular; Low Sodium (2 gm)    Anthropometric Measures:  Height: 180.3 cm (5' 11\")  Ideal Body Weight (IBW): 172 lbs (78 kg)       Current Body Weight: 194.6 kg (429 lb), 249.4 % IBW. Weight Source: Standing Scale  Current BMI (kg/m2): 59.9                       BMI Categories: Obese Class 3 (BMI 40.0 or greater)      Nutrition Diagnosis:   No nutrition diagnosis at this time    Nutrition Interventions:   Food and/or Nutrient Delivery: Continue Current Diet  Nutrition Education/Counseling: Education declined  Coordination of Nutrition Care: Continue to monitor while inpatient       Goals:     Goals: PO intake 50% or greater, prior to discharge       Nutrition Monitoring and Evaluation:   Behavioral-Environmental Outcomes: None  Identified  Food/Nutrient Intake Outcomes: Food and Nutrient Intake  Physical Signs/Symptoms Outcomes: Weight, Nutrition Focused Physical Findings, Biochemical Data    Discharge Planning:    Continue current diet     Rose Pierre, MS, RD, LD  Contact: Office: [phone]; [place]: [phone]

## 2024-03-05 NOTE — PROGRESS NOTES
Discharge instructions given/explained to patient, IV and telemetry removed, discharged to home in stable condition with all belongings via private transport.  Campos Higuera RN  3/5/2024

## 2024-03-05 NOTE — PROGRESS NOTES
Assessment complete. Vital signs stable. Plan of care for the day reviewed with patient, all questions answered. No needs expressed at this time. All belongings within reach, call light within reach, bed in the lowest position, non slip socks on when out of bed. Will continue to monitor.

## 2024-03-05 NOTE — DISCHARGE SUMMARY
Hospital Medicine Discharge Summary    Patient: Masnoor Walters   : 1995     Admit Date: 2024   Discharge Date: 3/5/2024    Disposition:  [x]Home   []HHC  []SNF  []ECF  []Acute Rehab  []LTAC  []Hospice  Code status:  [x]Full  []DNR/CCA  []Limited (DNR/CCA with Do Not Intubate)  []DNRCC  Condition at Discharge: Stable  Primary Care Provider: No primary care provider on file.    Admitting Provider: No admitting provider for patient encounter.  Discharge Provider: Leonardo Green MD     Discharge Diagnoses:      Active Hospital Problems    Diagnosis     Acute respiratory failure with hypercapnia (HCC) [J96.02]     Pulmonary venous congestion [R09.89]     Hypertensive emergency [I16.1]     Morbid obesity with BMI of 60.0-69.9, adult (HCC) [E66.01, Z68.44]     DELPHINE (obstructive sleep apnea) [G47.33]     Vapes nicotine containing substance [Z72.0]     Alcohol use [Z78.9]     Marijuana use [F12.90]        Presenting Admission History:      28-year-old male with past medical history of alcohol use disorder anxiety depression presented to the hospital because of respiratory symptoms going on for the last 2 weeks.  Symptoms of shortness of breath chest tightness going on for the last 2 weeks it got worse to the point the patient was uncomfortable with the check of the ED patient was found to have a heart rate of 110 with a respiratory rate of 20 blood pressure 174/103 satting at 90%.  Alcohol level was not detectable received 1 L of bolus along with multivitamins in the setting of suspected alcohol use disorder received a dose of Ativan 1 mg.  Along with a nicotine patch in the ED CT scan of the abdominal pelvis showed patchy pneumonia in the lung bases no acute abdominal findings otherwise.  WBC count is normal AST 59 ALT 41 alkaline phosphatase 196.     It is also to note that patient has been facing severe depression since the age of 11 does not have any active suicidal thoughts but feels like that would be  tablet Comments:   Reason for Stopping:               Significant Test Results    XR CHEST (2 VW)    Result Date: 2/27/2024  EXAMINATION: TWO XRAY VIEWS OF THE CHEST 2/26/2024 11:16 am COMPARISON: None. HISTORY: ORDERING SYSTEM PROVIDED HISTORY: chest pain, SOB and cough TECHNOLOGIST PROVIDED HISTORY: Reason for exam:->chest pain, SOB and cough Reason for Exam: cough for 2 weeks, middle chest pain FINDINGS: The heart is mildly enlarged.  No pleural effusion or pneumothorax is seen. There is prominence of the pulmonary vasculature.  No discrete focal lung consolidation is evident.     Mild cardiomegaly.  Prominence of the pulmonary vasculature could represent pulmonary vascular congestion. No discrete focal lung consolidation is seen.     XR CHEST PORTABLE    Result Date: 2/26/2024  EXAMINATION: ONE XRAY VIEW OF THE CHEST 2/26/2024 1:59 pm COMPARISON: CXR dated 02/26/2024 HISTORY: ORDERING SYSTEM PROVIDED HISTORY: sob TECHNOLOGIST PROVIDED HISTORY: Reason for exam:->sob Reason for Exam: sob FINDINGS: There is mild apparent widening of the mediastinum, may be projectional in nature. Low lung volumes and patchy bibasilar opacities.  Multiple nodular densities primarily in the right lung, difficult to ascertain whether these represent nodules are vessels en [place]. Recommend repeat radiograph with max inspiration and lateral views. No pneumothorax or pleural effusion.     1. There is mild apparent widening of the mediastinum, may be projectional in nature. 2. Low lung volumes and patchy bibasilar opacities.  Multiple nodular densities primarily in the right lung, difficult to ascertain whether these represent nodules are vessels en [place]. 3. Recommend repeat radiograph with max inspiration and lateral views.     CT ABDOMEN PELVIS W IV CONTRAST Additional Contrast? None    Result Date: 2/26/2024  EXAMINATION: CT OF THE ABDOMEN AND PELVIS WITH CONTRAST 2/26/2024 12:41 pm TECHNIQUE: CT of the abdomen and pelvis was performed  with the administration of intravenous contrast. Multiplanar reformatted images are provided for review. Automated exposure control, iterative reconstruction, and/or weight based adjustment of the mA/kV was utilized to reduce the radiation dose to as low as reasonably achievable. COMPARISON: July 2022 HISTORY: ORDERING SYSTEM PROVIDED HISTORY: abdominal pain with N/V TECHNOLOGIST PROVIDED HISTORY: Reason for exam:->abdominal pain with N/V Additional Contrast?->None Decision Support Exception - unselect if not a suspected or confirmed emergency medical condition->Emergency Medical Condition (MA) Reason for Exam: epigastric pain x 2 days-cough Additional signs and symptoms: denies n/v Relevant Medical/Surgical History: no surg FINDINGS: Lower Chest: Patchy nodular consolidative changes seen at the lung bases.  No pleural effusions. Organs: No splenomegaly. Adrenal glands unremarkable. No hydronephrosis on right.  No hydronephrosis on left No pancreatic calcification.  No peripancreatic fluid. Low attenuation seen in the liver, suggesting fatty infiltration. Gallbladder is contracted, accentuating its wall thickness. No pancreatic calcification noted.  No peripancreatic fluid GI/Bowel: No significant small bowel distention noted.  Mild stool load seen in colon.  No bowel obstruction noted.  No significant small or large bowel wall thickening.  Appendix is  identified and normal in caliber. Pelvis: No free fluid in pelvis.  No pelvic adenopathy.  Small inguinal nodes are seen bilaterally, similar to prior, Likely reactive Peritoneum/Retroperitoneum: No retroperitoneal adenopathy.  No retroperitoneal hematoma. Bones/Soft Tissues: Spurring is seen in the spine.  Spurring is seen in the hips.  Tiny periumbilical hernia containing fat is seen     Patchy pneumonia at the lung bases No acute intra-abdominal abnormality       Consults:     IP CONSULT TO SOCIAL WORK  IP CONSULT TO HOSPITALIST  IP CONSULT TO PSYCHIATRY  IP

## 2024-03-05 NOTE — PLAN OF CARE
Problem: Discharge Planning  Goal: Discharge to home or other facility with appropriate resources  3/4/2024 2207 by Jennifer Platt RN  Outcome: Progressing  Flowsheets (Taken 3/4/2024 2201)  Discharge to home or other facility with appropriate resources: Identify barriers to discharge with patient and caregiver  3/4/2024 1742 by Toshia Hernandez RN  Outcome: Progressing     Problem: Pain  Goal: Verbalizes/displays adequate comfort level or baseline comfort level  3/4/2024 2207 by Jennifer Platt RN  Outcome: Progressing  Flowsheets (Taken 3/4/2024 2015)  Verbalizes/displays adequate comfort level or baseline comfort level: Assess pain using appropriate pain scale  3/4/2024 1742 by Toshia Hernandez RN  Outcome: Progressing     Problem: Safety - Adult  Goal: Free from fall injury  3/4/2024 2207 by Jennifer Platt RN  Outcome: Progressing  Flowsheets (Taken 3/4/2024 2206)  Free From Fall Injury: (pt. is his own caregiver) --  3/4/2024 1742 by Toshia Hernandez, RN  Outcome: Progressing     Problem: ABCDS Injury Assessment  Goal: Absence of physical injury  3/4/2024 2207 by Jennifer Platt RN  Outcome: Progressing  Flowsheets (Taken 3/4/2024 2206)  Absence of Physical Injury: Implement safety measures based on patient assessment  3/4/2024 1742 by Toshia Hernandez, RN  Outcome: Progressing

## 2024-03-05 NOTE — PROGRESS NOTES
Pt has not worn bipap for last two nights and states he will not be wearing tonight, pulled bipap from room.

## 2024-03-15 NOTE — PROGRESS NOTES
Physician Progress Note      PATIENT:               NESSA OLIVA  CSN #:                  [id number]  :                       1995  ADMIT DATE:       2024 10:13 AM  DISCH DATE:        3/5/2024 2:47 PM  RESPONDING  PROVIDER #:        Leonardo Green MD          QUERY TEXT:    Pt admitted with \"Community acquired bacterial pneumonia/acute respiratory   failure/alcohol withdrawals.\"  If possible, please document in the progress   notes and discharge summary if you are evaluating and /or treating any of the   following:      The medical record reflects the following:  Risk Factors: pna- vascular congestion  Clinical Indicators: WBC-10- - 121,temp- 98- 101.7   Pulmonary-PN-   patient does have some pulm infiltrates on imaging in the bases and patient is   not spiking fever and for that reason patient's Rocephin and doxycycline was   reintroduced and further titration as per clinical status and cultures  Treatment: Continue empiric antibiotics- Rocephin, Vibramycin, blood cultures,   serial labs, Pulmonary consult    Thank-You, Tasia Alegre RN, BSN, CCDS  Options provided:  -- Evolving Sepsis due to pneumonia present on admission  -- Other - I will add my own diagnosis  -- Disagree - Not applicable / Not valid  -- Disagree - Clinically unable to determine / Unknown  -- Refer to Clinical Documentation Reviewer    PROVIDER RESPONSE TEXT:    This patient has evolving sepsis due to pneumonia which was present on   admission.    Query created by: Regine Alegre on 2024 10:20 AM      Electronically signed by:  Leonardo Green MD 3/15/2024 3:13 PM

## 2024-03-18 ENCOUNTER — HOSPITAL ENCOUNTER (OUTPATIENT)
Dept: SLEEP CENTER | Age: 29
Discharge: HOME | End: 2024-03-20
Payer: COMMERCIAL

## 2024-03-18 DIAGNOSIS — G47.33 OSA (OBSTRUCTIVE SLEEP APNEA): ICD-10-CM

## 2024-03-18 PROCEDURE — 95811 POLYSOM 6/>YRS CPAP 4/> PARM: CPT

## 2024-03-20 PROCEDURE — 95811 POLYSOM 6/>YRS CPAP 4/> PARM: CPT | Performed by: INTERNAL MEDICINE

## 2024-03-21 DIAGNOSIS — G47.33 OSA (OBSTRUCTIVE SLEEP APNEA): Primary | ICD-10-CM

## 2024-03-25 ENCOUNTER — TELEPHONE (OUTPATIENT)
Dept: PULMONOLOGY | Age: 29
End: 2024-03-25

## 2024-03-25 NOTE — TELEPHONE ENCOUNTER
----- Message from Jez Muir MD sent at 3/21/2024  3:35 PM EDT -----  BiPAP 27/17 ordered from the [employer] company of patient's choice-patient to follow-up in 2 to 3 months time with a compliance data to see the efficacy and compliance with the BiPAP machine  ----- Message -----  From: Jacquie Maravilla MA  Sent: 3/21/2024  11:45 AM EDT  To: Jez Muir MD    Please review.  No followups scheduled.

## 2024-03-25 NOTE — TELEPHONE ENCOUNTER
Call patient was unable to leave a VM,  Call patient next contact (his father) Anthony, and ask to please relate a message to his son to call our office back.

## 2024-03-25 NOTE — TELEPHONE ENCOUNTER
Pt called back.  I informed him of results and asked him to get in touch with his insurance to see what DME is covered.  Once he has whom he would like to use, to call us back with that info and we can get orders sent to the DME.  I told him at that time, we will schedule his 31-90 day f/u with Dr. Muir.    Pt verbalized understanding.

## 2024-08-09 ENCOUNTER — TELEPHONE (OUTPATIENT)
Dept: PULMONOLOGY | Age: 29
End: 2024-08-09

## 2024-08-09 NOTE — TELEPHONE ENCOUNTER
Patient's insurance is not accepted at [facility].  A list of DME companies has been faxed to the [facility] where the patient is currently staying.  Patient will contact the insurance and see who is a covered provider and let us know who to send the orders to.     Email: [email or web address]

## 2024-08-15 ENCOUNTER — TELEPHONE (OUTPATIENT)
Dept: PULMONOLOGY | Age: 29
End: 2024-08-15

## 2024-08-15 NOTE — TELEPHONE ENCOUNTER
Pt called in asking us to send his sleep study to [facility].   Asked I him if they needed anything else he said they didn't.

## 2024-08-29 DIAGNOSIS — G47.33 OSA (OBSTRUCTIVE SLEEP APNEA): Primary | ICD-10-CM

## 2024-11-22 ENCOUNTER — OFFICE VISIT (OUTPATIENT)
Dept: INTERNAL MEDICINE CLINIC | Age: 29
End: 2024-11-22
Payer: MEDICAID

## 2024-11-22 VITALS
HEIGHT: 71 IN | DIASTOLIC BLOOD PRESSURE: 106 MMHG | HEART RATE: 84 BPM | WEIGHT: 315 LBS | OXYGEN SATURATION: 84 % | SYSTOLIC BLOOD PRESSURE: 156 MMHG | BODY MASS INDEX: 44.1 KG/M2

## 2024-11-22 DIAGNOSIS — Z11.59 NEED FOR HEPATITIS C SCREENING TEST: ICD-10-CM

## 2024-11-22 DIAGNOSIS — E66.01 CLASS 3 SEVERE OBESITY DUE TO EXCESS CALORIES WITH BODY MASS INDEX (BMI) OF 50.0 TO 59.9 IN ADULT, UNSPECIFIED WHETHER SERIOUS COMORBIDITY PRESENT: ICD-10-CM

## 2024-11-22 DIAGNOSIS — Z11.4 ENCOUNTER FOR SCREENING FOR HIV: ICD-10-CM

## 2024-11-22 DIAGNOSIS — G47.33 OSA (OBSTRUCTIVE SLEEP APNEA): ICD-10-CM

## 2024-11-22 DIAGNOSIS — E66.813 CLASS 3 SEVERE OBESITY DUE TO EXCESS CALORIES WITH BODY MASS INDEX (BMI) OF 50.0 TO 59.9 IN ADULT, UNSPECIFIED WHETHER SERIOUS COMORBIDITY PRESENT: ICD-10-CM

## 2024-11-22 DIAGNOSIS — I10 PRIMARY HYPERTENSION: Primary | ICD-10-CM

## 2024-11-22 DIAGNOSIS — F10.21 ALCOHOL DEPENDENCE IN REMISSION (HCC): ICD-10-CM

## 2024-11-22 DIAGNOSIS — F32.A ANXIETY AND DEPRESSION: ICD-10-CM

## 2024-11-22 DIAGNOSIS — F41.9 ANXIETY AND DEPRESSION: ICD-10-CM

## 2024-11-22 PROBLEM — Z78.9 ALCOHOL USE: Status: RESOLVED | Noted: 2024-02-26 | Resolved: 2024-11-22

## 2024-11-22 PROBLEM — K92.2 UPPER GI BLEED: Status: RESOLVED | Noted: 2022-07-25 | Resolved: 2024-11-22

## 2024-11-22 PROBLEM — J96.02 ACUTE RESPIRATORY FAILURE WITH HYPERCAPNIA: Status: RESOLVED | Noted: 2024-02-26 | Resolved: 2024-11-22

## 2024-11-22 PROBLEM — R07.9 CHEST PAIN: Status: RESOLVED | Noted: 2021-08-20 | Resolved: 2024-11-22

## 2024-11-22 PROBLEM — J15.9 COMMUNITY ACQUIRED BACTERIAL PNEUMONIA: Status: RESOLVED | Noted: 2024-02-26 | Resolved: 2024-11-22

## 2024-11-22 PROBLEM — E87.6 HYPOKALEMIA: Status: RESOLVED | Noted: 2022-07-25 | Resolved: 2024-11-22

## 2024-11-22 PROBLEM — I16.1 HYPERTENSIVE EMERGENCY: Status: RESOLVED | Noted: 2024-02-26 | Resolved: 2024-11-22

## 2024-11-22 PROBLEM — F10.90 ALCOHOL USE: Status: RESOLVED | Noted: 2024-02-26 | Resolved: 2024-11-22

## 2024-11-22 PROBLEM — R74.8 ALKALINE PHOSPHATASE ELEVATION: Status: RESOLVED | Noted: 2022-07-26 | Resolved: 2024-11-22

## 2024-11-22 PROBLEM — D62 ACUTE BLOOD LOSS ANEMIA: Status: RESOLVED | Noted: 2022-07-25 | Resolved: 2024-11-22

## 2024-11-22 PROBLEM — F19.90 EXCESSIVE USE OF NONSTEROIDAL ANTI-INFLAMMATORY DRUG (NSAID): Status: RESOLVED | Noted: 2022-07-25 | Resolved: 2024-11-22
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
## 2024-11-22 PROCEDURE — 3077F SYST BP >= 140 MM HG: CPT

## 2024-11-22 PROCEDURE — 1036F TOBACCO NON-USER: CPT

## 2024-11-22 PROCEDURE — G8484 FLU IMMUNIZE NO ADMIN: HCPCS

## 2024-11-22 PROCEDURE — 3080F DIAST BP >= 90 MM HG: CPT

## 2024-11-22 PROCEDURE — 99204 OFFICE O/P NEW MOD 45 MIN: CPT

## 2024-11-22 PROCEDURE — G8427 DOCREV CUR MEDS BY ELIG CLIN: HCPCS

## 2024-11-22 PROCEDURE — G8417 CALC BMI ABV UP PARAM F/U: HCPCS

## 2024-11-22 RX ORDER — ACETAMINOPHEN 160 MG
2000 TABLET,DISINTEGRATING ORAL DAILY
COMMUNITY
Start: 2024-10-02 | End: 2024-11-22

## 2024-11-22 RX ORDER — GUANFACINE 1 MG/1
1 TABLET, EXTENDED RELEASE ORAL NIGHTLY
COMMUNITY

## 2024-11-22 RX ORDER — HYDRALAZINE HYDROCHLORIDE 50 MG/1
50 TABLET, FILM COATED ORAL 2 TIMES DAILY
Qty: 60 TABLET | Refills: 0
Start: 2024-11-22 | End: 2024-12-22

## 2024-11-22 RX ORDER — BUPROPION HYDROCHLORIDE 150 MG/1
150 TABLET ORAL EVERY MORNING
Qty: 30 TABLET | Refills: 1 | Status: SHIPPED | OUTPATIENT
Start: 2024-11-22

## 2024-11-22 RX ORDER — HYDROXYZINE HYDROCHLORIDE 25 MG/1
50 TABLET, FILM COATED ORAL EVERY 6 HOURS PRN
COMMUNITY
Start: 2024-10-15

## 2024-11-22 RX ORDER — DULOXETIN HYDROCHLORIDE 60 MG/1
60 CAPSULE, DELAYED RELEASE ORAL 2 TIMES DAILY
Qty: 60 CAPSULE | Refills: 1 | Status: SHIPPED | OUTPATIENT
Start: 2024-11-22

## 2024-11-22 RX ORDER — QUETIAPINE FUMARATE 300 MG/1
300 TABLET, FILM COATED ORAL DAILY
COMMUNITY

## 2024-11-22 SDOH — ECONOMIC STABILITY: FOOD INSECURITY: WITHIN THE PAST 12 MONTHS, YOU WORRIED THAT YOUR FOOD WOULD RUN OUT BEFORE YOU GOT MONEY TO BUY MORE.: OFTEN TRUE

## 2024-11-22 SDOH — ECONOMIC STABILITY: INCOME INSECURITY: HOW HARD IS IT FOR YOU TO PAY FOR THE VERY BASICS LIKE FOOD, HOUSING, MEDICAL CARE, AND HEATING?: HARD

## 2024-11-22 SDOH — ECONOMIC STABILITY: FOOD INSECURITY: WITHIN THE PAST 12 MONTHS, THE FOOD YOU BOUGHT JUST DIDN'T LAST AND YOU DIDN'T HAVE MONEY TO GET MORE.: NEVER TRUE

## 2024-11-22 ASSESSMENT — PATIENT HEALTH QUESTIONNAIRE - PHQ9
SUM OF ALL RESPONSES TO PHQ QUESTIONS 1-9: 4
1. LITTLE INTEREST OR PLEASURE IN DOING THINGS: NOT AT ALL
4. FEELING TIRED OR HAVING LITTLE ENERGY: SEVERAL DAYS
9. THOUGHTS THAT YOU WOULD BE BETTER OFF DEAD, OR OF HURTING YOURSELF: NOT AT ALL
10. IF YOU CHECKED OFF ANY PROBLEMS, HOW DIFFICULT HAVE THESE PROBLEMS MADE IT FOR YOU TO DO YOUR WORK, TAKE CARE OF THINGS AT HOME, OR GET ALONG WITH OTHER PEOPLE: NOT DIFFICULT AT ALL
SUM OF ALL RESPONSES TO PHQ QUESTIONS 1-9: 4
SUM OF ALL RESPONSES TO PHQ QUESTIONS 1-9: 4
7. TROUBLE CONCENTRATING ON THINGS, SUCH AS READING THE NEWSPAPER OR WATCHING TELEVISION: NOT AT ALL
5. POOR APPETITE OR OVEREATING: NOT AT ALL
2. FEELING DOWN, DEPRESSED OR HOPELESS: NOT AT ALL
SUM OF ALL RESPONSES TO PHQ9 QUESTIONS 1 & 2: 0
8. MOVING OR SPEAKING SO SLOWLY THAT OTHER PEOPLE COULD HAVE NOTICED. OR THE OPPOSITE, BEING SO FIGETY OR RESTLESS THAT YOU HAVE BEEN MOVING AROUND A LOT MORE THAN USUAL: NEARLY EVERY DAY
SUM OF ALL RESPONSES TO PHQ QUESTIONS 1-9: 4
3. TROUBLE FALLING OR STAYING ASLEEP: NOT AT ALL
6. FEELING BAD ABOUT YOURSELF - OR THAT YOU ARE A FAILURE OR HAVE LET YOURSELF OR YOUR FAMILY DOWN: NOT AT ALL

## 2024-11-22 ASSESSMENT — ANXIETY QUESTIONNAIRES
IF YOU CHECKED OFF ANY PROBLEMS ON THIS QUESTIONNAIRE, HOW DIFFICULT HAVE THESE PROBLEMS MADE IT FOR YOU TO DO YOUR WORK, TAKE CARE OF THINGS AT HOME, OR GET ALONG WITH OTHER PEOPLE: NOT DIFFICULT AT ALL
2. NOT BEING ABLE TO STOP OR CONTROL WORRYING: NOT AT ALL
5. BEING SO RESTLESS THAT IT IS HARD TO SIT STILL: SEVERAL DAYS
4. TROUBLE RELAXING: SEVERAL DAYS
7. FEELING AFRAID AS IF SOMETHING AWFUL MIGHT HAPPEN: NOT AT ALL
3. WORRYING TOO MUCH ABOUT DIFFERENT THINGS: NOT AT ALL
1. FEELING NERVOUS, ANXIOUS, OR ON EDGE: SEVERAL DAYS
6. BECOMING EASILY ANNOYED OR IRRITABLE: NOT AT ALL
GAD7 TOTAL SCORE: 3
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
## 2024-12-20 RX ORDER — HYDRALAZINE HYDROCHLORIDE 50 MG/1
50 TABLET, FILM COATED ORAL 2 TIMES DAILY
Qty: 60 TABLET | Refills: 0 | Status: SHIPPED | OUTPATIENT
Start: 2024-12-20 | End: 2025-01-19

## 2024-12-20 NOTE — TELEPHONE ENCOUNTER
Refill request for     hydrALAZINE (APRESOLINE) 50 MG tablet    medication.     Name of [facility]acy- [facility]      Last visit - 112224     Pending visit - none    Last refill -112224      Medication Contract signed -  Last Oarrs ran-     PDMP Monitoring:    Last PDMP Micha as Reviewed:  Review User Review Instant Review Result   EWELINA DE LTORO 11/22/2024  1:35 PM Reviewed PDMP [1]     [unfilled]  Urine Drug Screenings (1 yr)       Urine Drug Screen  Collected: 8/20/2021 11:40 AM (Final result)   Narrative: Performed at:  [facility]  [address] [place] [address]   Phone [phone]             Urine Drug Screen  Collected: 11/25/2013  6:55 AM (Final result)                  Medication Contract and Consent for Opioid Use Documents Filed        No documents found                      Additional Comments

## 2025-01-03 DIAGNOSIS — F10.21 ALCOHOL DEPENDENCE IN REMISSION (HCC): ICD-10-CM

## 2025-01-03 DIAGNOSIS — E66.813 CLASS 3 SEVERE OBESITY DUE TO EXCESS CALORIES WITH BODY MASS INDEX (BMI) OF 50.0 TO 59.9 IN ADULT, UNSPECIFIED WHETHER SERIOUS COMORBIDITY PRESENT: ICD-10-CM

## 2025-01-03 DIAGNOSIS — F41.9 ANXIETY AND DEPRESSION: ICD-10-CM

## 2025-01-03 DIAGNOSIS — E66.01 CLASS 3 SEVERE OBESITY DUE TO EXCESS CALORIES WITH BODY MASS INDEX (BMI) OF 50.0 TO 59.9 IN ADULT, UNSPECIFIED WHETHER SERIOUS COMORBIDITY PRESENT: ICD-10-CM

## 2025-01-03 DIAGNOSIS — Z11.59 NEED FOR HEPATITIS C SCREENING TEST: ICD-10-CM

## 2025-01-03 DIAGNOSIS — I10 PRIMARY HYPERTENSION: ICD-10-CM

## 2025-01-03 DIAGNOSIS — F32.A ANXIETY AND DEPRESSION: ICD-10-CM

## 2025-01-03 DIAGNOSIS — Z11.4 ENCOUNTER FOR SCREENING FOR HIV: ICD-10-CM

## 2025-01-03 LAB
ALBUMIN SERPL-MCNC: 4.4 G/DL (ref 3.4–5)
ALBUMIN/GLOB SERPL: 1.6 {RATIO} (ref 1.1–2.2)
ALP SERPL-CCNC: 157 U/L (ref 40–129)
ALT SERPL-CCNC: 22 U/L (ref 10–40)
ANION GAP SERPL CALCULATED.3IONS-SCNC: 14 MMOL/L (ref 3–16)
AST SERPL-CCNC: 21 U/L (ref 15–37)
BASOPHILS # BLD: 0.1 K/UL (ref 0–0.2)
BASOPHILS NFR BLD: 1 %
BILIRUB SERPL-MCNC: 0.6 MG/DL (ref 0–1)
BUN SERPL-MCNC: 9 MG/DL (ref 7–20)
CALCIUM SERPL-MCNC: 9.3 MG/DL (ref 8.3–10.6)
CHLORIDE SERPL-SCNC: 101 MMOL/L (ref 99–110)
CHOLEST SERPL-MCNC: 226 MG/DL (ref 0–199)
CO2 SERPL-SCNC: 23 MMOL/L (ref 21–32)
CREAT SERPL-MCNC: 0.8 MG/DL (ref 0.9–1.3)
DEPRECATED RDW RBC AUTO: 13.6 % (ref 12.4–15.4)
EOSINOPHIL # BLD: 0.3 K/UL (ref 0–0.6)
EOSINOPHIL NFR BLD: 4.4 %
EST. AVERAGE GLUCOSE BLD GHB EST-MCNC: 102.5 MG/DL
GFR SERPLBLD CREATININE-BSD FMLA CKD-EPI: >90 ML/MIN/{1.73_M2}
GLUCOSE SERPL-MCNC: 92 MG/DL (ref 70–99)
HBA1C MFR BLD: 5.2 %
HCT VFR BLD AUTO: 45.2 % (ref 40.5–52.5)
HCV AB SERPL QL IA: NORMAL
HDLC SERPL-MCNC: 42 MG/DL (ref 40–60)
HGB BLD-MCNC: 15.1 G/DL (ref 13.5–17.5)
LDLC SERPL CALC-MCNC: 158 MG/DL
LYMPHOCYTES # BLD: 1.6 K/UL (ref 1–5.1)
LYMPHOCYTES NFR BLD: 20.9 %
MAGNESIUM SERPL-MCNC: 2.04 MG/DL (ref 1.8–2.4)
MCH RBC QN AUTO: 30.3 PG (ref 26–34)
MCHC RBC AUTO-ENTMCNC: 33.4 G/DL (ref 31–36)
MCV RBC AUTO: 90.6 FL (ref 80–100)
MONOCYTES # BLD: 0.8 K/UL (ref 0–1.3)
MONOCYTES NFR BLD: 10 %
NEUTROPHILS # BLD: 4.8 K/UL (ref 1.7–7.7)
NEUTROPHILS NFR BLD: 63.7 %
PLATELET # BLD AUTO: 232 K/UL (ref 135–450)
PMV BLD AUTO: 8.8 FL (ref 5–10.5)
POTASSIUM SERPL-SCNC: 4.4 MMOL/L (ref 3.5–5.1)
PROT SERPL-MCNC: 7.2 G/DL (ref 6.4–8.2)
RBC # BLD AUTO: 4.99 M/UL (ref 4.2–5.9)
SODIUM SERPL-SCNC: 138 MMOL/L (ref 136–145)
TRIGL SERPL-MCNC: 132 MG/DL (ref 0–150)
TSH SERPL DL<=0.005 MIU/L-ACNC: 3.11 UIU/ML (ref 0.27–4.2)
VLDLC SERPL CALC-MCNC: 26 MG/DL
WBC # BLD AUTO: 7.6 K/UL (ref 4–11)

## 2025-01-03 RX ORDER — BUPROPION HYDROCHLORIDE 150 MG/1
150 TABLET ORAL EVERY MORNING
Qty: 30 TABLET | Refills: 1 | Status: SHIPPED | OUTPATIENT
Start: 2025-01-03

## 2025-01-03 RX ORDER — DULOXETIN HYDROCHLORIDE 60 MG/1
60 CAPSULE, DELAYED RELEASE ORAL 2 TIMES DAILY
Qty: 60 CAPSULE | Refills: 1 | Status: SHIPPED | OUTPATIENT
Start: 2025-01-03

## 2025-01-03 NOTE — TELEPHONE ENCOUNTER
Refill Request     Last Seen: Last Seen Department: 11/22/2024  Last Seen by PCP: 11/22/2024    Last Written: 11/22/2024      Next Appointment:   Future Appointments   Date Time Provider Department Center   1/10/2025  1:30 PM Blane Sotelo DO [facility] DEP     Requested Prescriptions     Pending Prescriptions Disp Refills    buPROPion (WELLBUTRIN XL) 150 MG extended release tablet 30 tablet 1     Sig: Take 1 tablet by mouth every morning    DULoxetine (CYMBALTA) 60 MG extended release capsule 60 capsule 1     Sig: Take 1 capsule by mouth 2 times daily

## 2025-01-04 LAB
HIV 1+2 AB+HIV1 P24 AG SERPL QL IA: NORMAL
HIV 2 AB SERPL QL IA: NORMAL
HIV1 AB SERPL QL IA: NORMAL
HIV1 P24 AG SERPL QL IA: NORMAL

## 2025-01-28 NOTE — PLAN OF CARE
Name: Sushil Harrison      : 1957      MRN: [id number]  Encounter Provider: Carlita Dietz MD  Encounter Date: 2025   Encounter department: [facility] CARDIOLOGY [place]    Lab work added   Problem: Pain:  Goal: Control of acute pain  Control of acute pain   Outcome: Ongoing  Patient has no complaints of pain at this time. PRN pain medication to be administered per MD orders. Pain assessed on a 0-10 scale. Repositioning for comfort.

## 2025-02-07 ENCOUNTER — TELEMEDICINE (OUTPATIENT)
Dept: INTERNAL MEDICINE CLINIC | Age: 30
End: 2025-02-07
Payer: MEDICAID

## 2025-02-07 DIAGNOSIS — E66.813 CLASS 3 SEVERE OBESITY DUE TO EXCESS CALORIES WITH BODY MASS INDEX (BMI) OF 50.0 TO 59.9 IN ADULT, UNSPECIFIED WHETHER SERIOUS COMORBIDITY PRESENT: ICD-10-CM

## 2025-02-07 DIAGNOSIS — F32.A ANXIETY AND DEPRESSION: ICD-10-CM

## 2025-02-07 DIAGNOSIS — F41.9 ANXIETY AND DEPRESSION: ICD-10-CM

## 2025-02-07 DIAGNOSIS — F10.21 ALCOHOL DEPENDENCE IN REMISSION (HCC): ICD-10-CM

## 2025-02-07 DIAGNOSIS — I10 PRIMARY HYPERTENSION: Primary | ICD-10-CM

## 2025-02-07 DIAGNOSIS — E66.01 CLASS 3 SEVERE OBESITY DUE TO EXCESS CALORIES WITH BODY MASS INDEX (BMI) OF 50.0 TO 59.9 IN ADULT, UNSPECIFIED WHETHER SERIOUS COMORBIDITY PRESENT: ICD-10-CM

## 2025-02-07 DIAGNOSIS — G47.33 OSA (OBSTRUCTIVE SLEEP APNEA): ICD-10-CM

## 2025-02-07 PROCEDURE — G8417 CALC BMI ABV UP PARAM F/U: HCPCS

## 2025-02-07 PROCEDURE — 99213 OFFICE O/P EST LOW 20 MIN: CPT

## 2025-02-07 PROCEDURE — G8427 DOCREV CUR MEDS BY ELIG CLIN: HCPCS

## 2025-02-07 PROCEDURE — 1036F TOBACCO NON-USER: CPT

## 2025-02-07 RX ORDER — DULOXETIN HYDROCHLORIDE 60 MG/1
60 CAPSULE, DELAYED RELEASE ORAL 2 TIMES DAILY
Qty: 180 CAPSULE | Refills: 1 | Status: SHIPPED | OUTPATIENT
Start: 2025-02-07

## 2025-02-07 RX ORDER — CARVEDILOL 12.5 MG/1
12.5 TABLET ORAL 2 TIMES DAILY WITH MEALS
Qty: 180 TABLET | Refills: 1 | Status: SHIPPED | OUTPATIENT
Start: 2025-02-07

## 2025-02-07 RX ORDER — HYDRALAZINE HYDROCHLORIDE 50 MG/1
50 TABLET, FILM COATED ORAL 2 TIMES DAILY
Qty: 180 TABLET | Refills: 1 | Status: SHIPPED | OUTPATIENT
Start: 2025-02-07

## 2025-02-07 RX ORDER — BUPROPION HYDROCHLORIDE 150 MG/1
150 TABLET ORAL EVERY MORNING
Qty: 90 TABLET | Refills: 1 | Status: SHIPPED | OUTPATIENT
Start: 2025-02-07

## 2025-02-07 SDOH — ECONOMIC STABILITY: INCOME INSECURITY: IN THE LAST 12 MONTHS, WAS THERE A TIME WHEN YOU WERE NOT ABLE TO PAY THE MORTGAGE OR RENT ON TIME?: NO

## 2025-02-07 SDOH — ECONOMIC STABILITY: FOOD INSECURITY: WITHIN THE PAST 12 MONTHS, THE FOOD YOU BOUGHT JUST DIDN'T LAST AND YOU DIDN'T HAVE MONEY TO GET MORE.: NEVER TRUE

## 2025-02-07 SDOH — ECONOMIC STABILITY: FOOD INSECURITY: WITHIN THE PAST 12 MONTHS, YOU WORRIED THAT YOUR FOOD WOULD RUN OUT BEFORE YOU GOT MONEY TO BUY MORE.: NEVER TRUE

## 2025-02-07 SDOH — ECONOMIC STABILITY: TRANSPORTATION INSECURITY
IN THE PAST 12 MONTHS, HAS THE LACK OF TRANSPORTATION KEPT YOU FROM MEDICAL APPOINTMENTS OR FROM GETTING MEDICATIONS?: NO

## 2025-02-07 SDOH — ECONOMIC STABILITY: TRANSPORTATION INSECURITY
IN THE PAST 12 MONTHS, HAS LACK OF TRANSPORTATION KEPT YOU FROM MEETINGS, WORK, OR FROM GETTING THINGS NEEDED FOR DAILY LIVING?: NO

## 2025-02-07 NOTE — PROGRESS NOTES
symptoms to improve with weight loss.           Return in about 6 months (around 8/7/2025).       Subjective     Patient presents today for virtual visit.  We were able to connect via video, but audio was not working so we transition to audio call only.  She    Denies any acute complaints, states all symptoms have been well-controlled.  BP at home has been mildly elevated but he is continuing with his current regimen.  Mood has been stable.  He has been losing some weight mostly via diet.  Has been getting a lot of emotional support from his fiancée.  Denies any acute complaints.         Objective   Patient-Reported Vitals  No data recorded     General-alert and oriented, no acute distress  Head-normocephalic, atraumatic  Eyes-EOMI, no icterus           --Blane Sotelo, DO

## 2025-02-07 NOTE — ASSESSMENT & PLAN NOTE
Educated about proper diet and exercise.  Patient states he has been losing weight mostly with dietary control, support given.

## 2025-02-07 NOTE — ASSESSMENT & PLAN NOTE
Was recommended BiPAP therapy in the past.  Will follow-up on sleep symptoms at next office visit.  Expect symptoms to improve with weight loss.

## 2025-02-07 NOTE — ASSESSMENT & PLAN NOTE
Symptoms are well-controlled, patient is on duloxetine 60 mg twice a day as well as Wellbutrin 150 mg once a day.  Continue the same.

## 2025-02-21 ENCOUNTER — TELEPHONE (OUTPATIENT)
Dept: INTERNAL MEDICINE CLINIC | Age: 30
End: 2025-02-21

## 2025-02-21 RX ORDER — HYDROXYZINE HYDROCHLORIDE 25 MG/1
50 TABLET, FILM COATED ORAL EVERY 6 HOURS PRN
Qty: 30 TABLET | Refills: 2 | Status: SHIPPED | OUTPATIENT
Start: 2025-02-21

## 2025-02-21 RX ORDER — QUETIAPINE FUMARATE 300 MG/1
300 TABLET, FILM COATED ORAL DAILY
Qty: 90 TABLET | Refills: 1 | Status: SHIPPED | OUTPATIENT
Start: 2025-02-21

## 2025-02-21 NOTE — TELEPHONE ENCOUNTER
Refill request for medication.   QUETIAPINE  HYDROXYZINE    Name of Pharmacy- [facility]      Last visit - 2-7-25     Pending visit - 8-8-25    Last refill -10-15-24      Medication Contract signed -   Last Oarrs ran-         Additional Comments  PLEASE CALL PATIENT WHEN PRESCRIPTIONS HAVE BEEN SENT

## (undated) DEVICE — ELECTRODE,ECG,STRESS,FOAM,3PK: Brand: MEDLINE

## (undated) DEVICE — ENDOSCOPIC KIT 2 12 FT OP4 DE2 GWN SYR

## (undated) DEVICE — CONMED SCOPE SAVER BITE BLOCK, 20X27 MM: Brand: SCOPE SAVER

## (undated) DEVICE — FORCEPS BX L240CM JAW DIA2.8MM L CAP W/ NDL MIC MESH TOOTH

## (undated) DEVICE — CANNULA NSL AD TBNG L7FT PVC STR NONFLARED PRNG O2 DEL W STD